# Patient Record
Sex: MALE | Race: WHITE | NOT HISPANIC OR LATINO | Employment: FULL TIME | URBAN - METROPOLITAN AREA
[De-identification: names, ages, dates, MRNs, and addresses within clinical notes are randomized per-mention and may not be internally consistent; named-entity substitution may affect disease eponyms.]

---

## 2017-01-17 ENCOUNTER — TELEPHONE (OUTPATIENT)
Dept: ORTHOPEDICS | Facility: CLINIC | Age: 62
End: 2017-01-17

## 2017-01-17 NOTE — TELEPHONE ENCOUNTER
----- Message from Rima Hardy sent at 1/17/2017  9:07 AM CST -----  Contact: self @ home   Pt need x-rays scheduled for his back, also requesting pain management for the pain.

## 2017-01-17 NOTE — TELEPHONE ENCOUNTER
Spoke with pt.  States he had a fall in December.  Pt still has lower back pain.  Pt denies any right hip pain.  Scheduled pt to see Alisa Correia PA-C

## 2017-01-18 ENCOUNTER — TELEPHONE (OUTPATIENT)
Dept: ORTHOPEDICS | Facility: CLINIC | Age: 62
End: 2017-01-18

## 2017-01-18 DIAGNOSIS — M54.50 LUMBAR SPINE PAIN: Primary | ICD-10-CM

## 2017-01-20 ENCOUNTER — TELEPHONE (OUTPATIENT)
Dept: ORTHOPEDICS | Facility: CLINIC | Age: 62
End: 2017-01-20

## 2017-01-20 ENCOUNTER — HOSPITAL ENCOUNTER (OUTPATIENT)
Dept: RADIOLOGY | Facility: HOSPITAL | Age: 62
Discharge: HOME | End: 2017-01-20
Attending: ORTHOPAEDIC SURGERY
Payer: COMMERCIAL

## 2017-01-20 ENCOUNTER — OFFICE VISIT (OUTPATIENT)
Dept: ORTHOPEDICS | Facility: CLINIC | Age: 62
End: 2017-01-20
Payer: COMMERCIAL

## 2017-01-20 VITALS
BODY MASS INDEX: 22.34 KG/M2 | WEIGHT: 174.06 LBS | SYSTOLIC BLOOD PRESSURE: 119 MMHG | DIASTOLIC BLOOD PRESSURE: 75 MMHG | HEART RATE: 97 BPM | HEIGHT: 74 IN

## 2017-01-20 DIAGNOSIS — M54.50 LUMBAR SPINE PAIN: ICD-10-CM

## 2017-01-20 DIAGNOSIS — M54.2 NECK PAIN: ICD-10-CM

## 2017-01-20 DIAGNOSIS — S12.9XXA: ICD-10-CM

## 2017-01-20 DIAGNOSIS — S12.9XXA: Primary | ICD-10-CM

## 2017-01-20 PROCEDURE — 3078F DIAST BP <80 MM HG: CPT | Mod: S$GLB,,, | Performed by: PHYSICIAN ASSISTANT

## 2017-01-20 PROCEDURE — 99214 OFFICE O/P EST MOD 30 MIN: CPT | Mod: S$GLB,,, | Performed by: PHYSICIAN ASSISTANT

## 2017-01-20 PROCEDURE — 1159F MED LIST DOCD IN RCRD: CPT | Mod: S$GLB,,, | Performed by: PHYSICIAN ASSISTANT

## 2017-01-20 PROCEDURE — 99999 PR PBB SHADOW E&M-EST. PATIENT-LVL III: CPT | Mod: PBBFAC,,, | Performed by: PHYSICIAN ASSISTANT

## 2017-01-20 PROCEDURE — 72100 X-RAY EXAM L-S SPINE 2/3 VWS: CPT | Mod: 26,,, | Performed by: RADIOLOGY

## 2017-01-20 PROCEDURE — 72050 X-RAY EXAM NECK SPINE 4/5VWS: CPT | Mod: 26,,, | Performed by: RADIOLOGY

## 2017-01-20 PROCEDURE — 72120 X-RAY BEND ONLY L-S SPINE: CPT | Mod: 26,,, | Performed by: RADIOLOGY

## 2017-01-20 PROCEDURE — 3074F SYST BP LT 130 MM HG: CPT | Mod: S$GLB,,, | Performed by: PHYSICIAN ASSISTANT

## 2017-01-20 PROCEDURE — 72050 X-RAY EXAM NECK SPINE 4/5VWS: CPT | Mod: TC

## 2017-01-20 PROCEDURE — 72120 X-RAY BEND ONLY L-S SPINE: CPT | Mod: TC

## 2017-01-20 RX ORDER — TRAMADOL HYDROCHLORIDE 50 MG/1
50-100 TABLET ORAL
Qty: 90 TABLET | Refills: 0 | Status: SHIPPED | OUTPATIENT
Start: 2017-01-20 | End: 2017-01-23

## 2017-01-20 NOTE — PROGRESS NOTES
DATE: 1/20/2017  PATIENT: Rah Puente    Supervising Physician: Franklyn Ng M.D.    CHIEF COMPLAINT: neck pain    HISTORY:  Rah Puente is a 61 y.o. male here for initial evaluation of neck pain (Neck - 3, Arm - 0). The pain has been present for about 6 weeks.  He fell at his house in December and landed on his coffee table.  Since then he has had neck pain.  He has a history of chronic back pain as well.  The patient describes the pain as aching. The pain is worse with extension and improved by nothing in particular. There is no associated numbness and tingling. He says he has been going to PT and one time while the therapist was doing a manipulation he felt numbness and tingling in his right hand but hasn't felt any since.  There is no subjective weakness.  He says he feels like he has progressive kyphosis of his neck.  Prior treatments have included ibuprofen and hydrocodone and physical therapy, but no ESIs or surgery.     The patient denies myelopathic symptoms such as handwriting changes or difficulty with buttons/coins/keys. Denies perineal paresthesias, bowel/bladder dysfunction.    PAST MEDICAL/SURGICAL HISTORY:  Past Medical History   Diagnosis Date    Alcohol abuse     BPH (benign prostatic hyperplasia)     Diabetes mellitus     Hypertension     Hyponatremia      Past Surgical History   Procedure Laterality Date    Hernia repair         Medications:  Current Outpatient Prescriptions on File Prior to Visit   Medication Sig Dispense Refill    gabapentin (NEURONTIN) 300 MG capsule Take 300 mg by mouth 2 (two) times daily.      metformin (GLUCOPHAGE) 500 MG tablet Take 500 mg by mouth 2 (two) times daily with meals.      metoprolol succinate (TOPROL-XL) 200 MG 24 hr tablet Take 200 mg by mouth 2 (two) times daily.      quinapril (ACCUPRIL) 20 MG tablet Take 20 mg by mouth every evening.      tamsulosin (FLOMAX) 0.4 mg Cp24 Take 0.4 mg by mouth once daily.       "[DISCONTINUED] hydrocodone-acetaminophen 5-325mg (NORCO) 5-325 mg per tablet Take 1 tablet by mouth every 6 (six) hours as needed for Pain. 60 tablet 0     No current facility-administered medications on file prior to visit.        Social History:   Social History     Social History    Marital status: Single     Spouse name: N/A    Number of children: N/A    Years of education: N/A     Occupational History    Not on file.     Social History Main Topics    Smoking status: Former Smoker     Packs/day: 1.00     Types: Cigarettes     Quit date: 12/7/2014    Smokeless tobacco: Not on file    Alcohol use 2.4 oz/week     4 Shots of liquor per week      Comment: vodka-nightly    Drug use: No    Sexual activity: Not on file     Other Topics Concern    Not on file     Social History Narrative       REVIEW OF SYSTEMS:  Constitution: Negative. Negative for chills, fever and night sweats.   Cardiovascular: Negative for chest pain and syncope.   Respiratory: Negative for cough and shortness of breath.   Gastrointestinal: See HPI. Negative for nausea/vomiting. Negative for abdominal pain.  Genitourinary: See HPI. Negative for discoloration or dysuria.  Skin: Negative for dry skin, itching and rash.   Hematologic/Lymphatic: Negative for bleeding problem. Does not bruise/bleed easily.   Musculoskeletal: Negative for falls and muscle weakness.   Neurological: See HPI. No seizures.   Endocrine: Negative for polydipsia, polyphagia and polyuria.   Allergic/Immunologic: Negative for hives and persistent infections.  Psychiatric/Behavioral: Negative for depression and insomnia.         EXAM:  Visit Vitals    /75    Pulse 97    Ht 6' 2" (1.88 m)    Wt 78.9 kg (174 lb 0.9 oz)    BMI 22.35 kg/m2       General: The patient is a very pleasant 61 y.o. male in no apparent distress, the patient is oriented to person, place and time.  Psych: Normal mood and affect  HEENT: Vision grossly intact, hearing intact to the spoken " word.  Lungs: Respirations unlabored.  Gait: Normal station and gait, no difficulty with toe or heel walk.   Skin: Cervical skin negative for rashes, lesions, hairy patches and surgical scars.  Range of motion: Cervical range of motion is acceptable. There is mild trapezial tenderness to palpation.  Spinal Balance: Global saggital and coronal spinal balance acceptable, no significant for scoliosis and kyphosis.  Musculoskeletal: No pain with the range of motion of the bilateral shoulders and elbows. Normal bulk and contour of the bilateral hands.  Vascular: Bilateral hands warm and well perfused, radial pulses 2+ bilaterally.  Neurological: Normal strength and tone in all major motor groups in the bilateral upper and lower extremities. Normal sensation to light touch in the C5-T1 and L2-S1 dermatomes bilaterally.  Deep tendon reflexes symmetric 2+ in the bilateral upper and lower extremities.  Mildly positive Inverted Radial Reflex on the right, negative on the left and negative Barbour's bilaterally. Negative Babinski bilaterally.     IMAGING:   Today I personally reviewed AP, Lat and Flex/Ex  upright C-spine films that demonstrate compression of C5.  There is a spinous process fracture of C6.  There is no instability on flexion and extension.     ASSESSMENT/PLAN:    Rah was seen today for low-back pain and neck pain.    Diagnoses and all orders for this visit:    Fracture of spinous process of cervical vertebra, initial encounter  -     X-Ray Cervical Spine AP Lat with Flex Ex; Future  -     CT Cervical Spine Without Contrast; Future    Compression fracture of cervical vertebra, initial encounter    Other orders  -     tramadol (ULTRAM) 50 mg tablet; Take 1-2 tablets ( mg total) by mouth every 4 to 6 hours as needed.    There is a compression fracture fracture of C5 and spinous process fracture of C6.  There is no instability on flexion and extension.  I have discussed with the patient my recommendation to  wear a cervical collar.  He does not want to wear a collar.  CT cervical spine for further evaluation.  He will follow up with me after the CT to discuss results and further treatment.      Return if symptoms worsen or fail to improve.

## 2017-01-20 NOTE — TELEPHONE ENCOUNTER
----- Message from Anna Mckeon MA sent at 1/19/2017 11:54 AM CST -----  Contact: Shantell Crawford [phone]      ----- Message -----     From: Rima Hardy     Sent: 1/19/2017  11:34 AM       To: [facility] Cuellar Staff    Looking for x-rays to rule out fx in cervical spine from recent fall, 4 wks ago due to continue pain.

## 2017-01-21 ENCOUNTER — HOSPITAL ENCOUNTER (OUTPATIENT)
Dept: RADIOLOGY | Facility: HOSPITAL | Age: 62
Discharge: HOME | End: 2017-01-21
Attending: ORTHOPAEDIC SURGERY
Payer: COMMERCIAL

## 2017-01-21 DIAGNOSIS — S12.9XXA: ICD-10-CM

## 2017-01-21 PROCEDURE — 72125 CT NECK SPINE W/O DYE: CPT | Mod: 26,,, | Performed by: RADIOLOGY

## 2017-01-21 PROCEDURE — 72125 CT NECK SPINE W/O DYE: CPT | Mod: TC

## 2017-01-23 ENCOUNTER — OFFICE VISIT (OUTPATIENT)
Dept: ORTHOPEDICS | Facility: CLINIC | Age: 62
End: 2017-01-23
Payer: COMMERCIAL

## 2017-01-23 VITALS
DIASTOLIC BLOOD PRESSURE: 102 MMHG | SYSTOLIC BLOOD PRESSURE: 176 MMHG | BODY MASS INDEX: 22.07 KG/M2 | HEIGHT: 74 IN | HEART RATE: 92 BPM | WEIGHT: 172 LBS

## 2017-01-23 DIAGNOSIS — S12.9XXA: Primary | ICD-10-CM

## 2017-01-23 DIAGNOSIS — M48.52XA: ICD-10-CM

## 2017-01-23 PROCEDURE — 1159F MED LIST DOCD IN RCRD: CPT | Mod: S$GLB,,, | Performed by: PHYSICIAN ASSISTANT

## 2017-01-23 PROCEDURE — 3077F SYST BP >= 140 MM HG: CPT | Mod: S$GLB,,, | Performed by: PHYSICIAN ASSISTANT

## 2017-01-23 PROCEDURE — 99999 PR PBB SHADOW E&M-EST. PATIENT-LVL III: CPT | Mod: PBBFAC,,, | Performed by: PHYSICIAN ASSISTANT

## 2017-01-23 PROCEDURE — 99213 OFFICE O/P EST LOW 20 MIN: CPT | Mod: S$GLB,,, | Performed by: PHYSICIAN ASSISTANT

## 2017-01-23 PROCEDURE — 3080F DIAST BP >= 90 MM HG: CPT | Mod: S$GLB,,, | Performed by: PHYSICIAN ASSISTANT

## 2017-01-23 RX ORDER — HYDROCODONE BITARTRATE AND ACETAMINOPHEN 10; 325 MG/1; MG/1
1 TABLET ORAL EVERY 4 HOURS PRN
Qty: 90 TABLET | Refills: 0 | Status: SHIPPED | OUTPATIENT
Start: 2017-01-23 | End: 2017-02-07 | Stop reason: SDUPTHER

## 2017-01-23 NOTE — PROGRESS NOTES
"DATE: 1/23/2017  PATIENT: Rah Puente    Attending Physician: Franklyn Ng M.D.    HISTORY:  Rah Puente is a 61 y.o. male who returns to me today for CT results.  He was last seen by me 1/20/2017.  Today he is doing well but notes the tramadol does not help with his pain.    The Patient denies myelopathic symptoms such as handwriting changes or difficulty with buttons/coins/keys. Denies perineal paresthesias, bowel/bladder dysfunction.    PMH/PSH/FamHx/SocHx:  Unchanged from prior visit    ROS:  REVIEW OF SYSTEMS:  Constitution: Negative. Negative for chills, fever and night sweats.   HENT: Negative for congestion and headaches.    Eyes: Negative for blurred vision, left vision loss and right vision loss.   Cardiovascular: Negative for chest pain and syncope.   Respiratory: Negative for cough and shortness of breath.    Endocrine: Negative for polydipsia, polyphagia and polyuria.   Hematologic/Lymphatic: Negative for bleeding problem. Does not bruise/bleed easily.   Skin: Negative for dry skin, itching and rash.   Musculoskeletal: Negative for falls and muscle weakness.   Gastrointestinal: Negative for abdominal pain and bowel incontinence.   Allergic/Immunologic: Negative for hives and persistent infections.  Genitourinary: Negative for urinary retention/incontinence and nocturia.   Neurological: Negative for disturbances in coordination, no myelopathic symptoms such as handwriting changes or difficulty with buttons, coins, keys or small objects. No loss of balance and seizures.   Psychiatric/Behavioral: Negative for depression. The patient does not have insomnia.   Denies myelopathic symptoms, perineal paresthesias, bowel or bladder incontinence    EXAM:  Visit Vitals    BP (!) 176/102 (BP Location: Right arm, Patient Position: Sitting, BP Method: Automatic)    Pulse 92    Ht 6' 2" (1.88 m)    Wt 78 kg (172 lb)    BMI 22.08 kg/m2       General: The patient is a very pleasant 61 y.o. " male in no apparent distress, the patient is oriented to person, place and time.  Psych: Normal mood and affect  HEENT: Vision grossly intact, hearing intact to the spoken word.  Lungs: Respirations unlabored.  Gait: Normal station and gait, no difficulty with toe or heel walk.   Skin: Cervical skin negative for rashes, lesions, hairy patches and surgical scars.  Range of motion: Cervical range of motion is acceptable. There is mild trapezial tenderness to palpation.  Spinal Balance: Global saggital and coronal spinal balance acceptable, no significant for scoliosis and kyphosis.  Musculoskeletal: No pain with the range of motion of the bilateral shoulders and elbows. Normal bulk and contour of the bilateral hands.  Vascular: Bilateral hands warm and well perfused, radial pulses 2+ bilaterally.  Neurological: Normal strength and tone in all major motor groups in the bilateral upper and lower extremities. Normal sensation to light touch in the C5-T1 and L2-S1 dermatomes bilaterally.  Deep tendon reflexes symmetric 2+ in the bilateral upper and lower extremities.  Mildly positive Inverted Radial Reflex on the right, negative on the left and negative Barbour's bilaterally. Negative Babinski bilaterally.     IMAGING:    Today I personally re-reviewed AP, Lat and Flex/Ex  upright C-spine films that demonstrate compression of C5. There is a spinous process fracture of C6. There is no instability on flexion and extension.     CT cervical spine demonstrates multiple lytic lesions in the lower cervical/upper thoracic spine with multiple lung nodules.  There is fracture of C6 spinous process.  Compression of C5 vertebral body.      ASSESSMENT/PLAN:    Diagnoses and all orders for this visit:    Fracture of spinous process of cervical vertebra, initial encounter    Pathologic compression fracture of cervical vertebra, initial encounter    Other orders  -     hydrocodone-acetaminophen 10-325mg (NORCO)  mg Tab; Take 1  tablet by mouth every 4 (four) hours as needed for Pain.      Discussed with Dr. Ng.  The patient has a history of colon resection in March 2016.  No history of primary cancer.  He was fitted with a C-collar today.  Will refer to Dr. Goetz for further evaluation.     No Follow-up on file.

## 2017-01-26 ENCOUNTER — TELEPHONE (OUTPATIENT)
Dept: HEMATOLOGY/ONCOLOGY | Facility: CLINIC | Age: 62
End: 2017-01-26

## 2017-01-27 ENCOUNTER — TELEPHONE (OUTPATIENT)
Dept: ORTHOPEDICS | Facility: CLINIC | Age: 62
End: 2017-01-27

## 2017-01-27 ENCOUNTER — TELEPHONE (OUTPATIENT)
Dept: HEMATOLOGY/ONCOLOGY | Facility: CLINIC | Age: 62
End: 2017-01-27

## 2017-01-30 ENCOUNTER — INITIAL CONSULT (OUTPATIENT)
Dept: HEMATOLOGY/ONCOLOGY | Facility: CLINIC | Age: 62
End: 2017-01-30
Payer: COMMERCIAL

## 2017-01-30 VITALS
BODY MASS INDEX: 21.94 KG/M2 | SYSTOLIC BLOOD PRESSURE: 160 MMHG | WEIGHT: 170.88 LBS | DIASTOLIC BLOOD PRESSURE: 72 MMHG | RESPIRATION RATE: 18 BRPM | TEMPERATURE: 98 F | HEART RATE: 97 BPM

## 2017-01-30 DIAGNOSIS — R91.8 PULMONARY NODULES/LESIONS, MULTIPLE: ICD-10-CM

## 2017-01-30 DIAGNOSIS — G89.3 NEOPLASM RELATED PAIN (ACUTE) (CHRONIC): ICD-10-CM

## 2017-01-30 DIAGNOSIS — Z85.038 HISTORY OF COLON CANCER, STAGE I: Primary | ICD-10-CM

## 2017-01-30 DIAGNOSIS — M89.9 LYTIC LESION OF BONE ON X-RAY: ICD-10-CM

## 2017-01-30 PROCEDURE — 99999 PR PBB SHADOW E&M-EST. PATIENT-LVL IV: CPT | Mod: PBBFAC,,, | Performed by: INTERNAL MEDICINE

## 2017-01-30 PROCEDURE — 1159F MED LIST DOCD IN RCRD: CPT | Mod: S$GLB,,, | Performed by: INTERNAL MEDICINE

## 2017-01-30 PROCEDURE — 3078F DIAST BP <80 MM HG: CPT | Mod: S$GLB,,, | Performed by: INTERNAL MEDICINE

## 2017-01-30 PROCEDURE — 3077F SYST BP >= 140 MM HG: CPT | Mod: S$GLB,,, | Performed by: INTERNAL MEDICINE

## 2017-01-30 PROCEDURE — 99205 OFFICE O/P NEW HI 60 MIN: CPT | Mod: S$GLB,,, | Performed by: INTERNAL MEDICINE

## 2017-01-30 RX ORDER — FENTANYL 50 UG/1
1 PATCH TRANSDERMAL
Qty: 5 PATCH | Refills: 0 | Status: ON HOLD | OUTPATIENT
Start: 2017-01-30 | End: 2017-03-04 | Stop reason: HOSPADM

## 2017-01-30 NOTE — LETTER
January 31, 2017      Alisa Correia PA-C  [address] [facility] [address]           [facility]  [address]  [facility] [address]  Phone: [phone]          Patient: Rah Puente   MR Number: [id number]   YOB: 1955   Date of Visit: 1/30/2017       Dear Alisa Correia:    Thank you for referring Rah Puente to me for evaluation. Attached you will find relevant portions of my assessment and plan of care.    If you have questions, please do not hesitate to call me. I look forward to following Rah Puente along with you.    Sincerely,    Bill Goetz MD    Enclosure  CC:  No Recipients    If you would like to receive this communication electronically, please contact [email or web address] or [phone] to request more information on [email or web address] Link access.    For providers and/or their staff who would like to refer a patient to [facility], please contact us through our one-stop-shop provider referral line, [facility], at [phone].    If you feel you have received this communication in error or would no longer like to receive these types of communications, please e-mail [email or web address]

## 2017-01-30 NOTE — Clinical Note
Schedule labs (CBC,CMP), CT chest/abdomen/pelvis and bone scan ASAP.  Schedule IR guided lung biopsy. Have pt RTC 1.5-2 weeks after biopsy to review results with Dr. Goetz.

## 2017-01-30 NOTE — MR AVS SNAPSHOT
[facility] - [facility]ematology Oncology  [address]  [facility] [address]  Phone: [phone]                  Rah Puente   2017 3:30 PM   Initial consult    Description:  Male : 1955   Provider:  Bill Goetz MD   Department:  [facility] - Hematology Oncology           Reason for Visit     bone mets           Diagnoses this Visit        Comments    History of colon cancer, stage I    -  Primary     Lytic lesion of bone on x-ray         Pulmonary nodules/lesions, multiple                To Do List           Goals (5 Years of Data)     None      [facility]     [facility] Nurse Care Line -  Assistance  Registered nurses in the [facility] Center provide clinical advisement, health education, appointment booking, and other advisory services.  Call for this free service at [phone].             Medications           Message regarding Medications     Verify the changes and/or additions to your medication regime listed below are the same as discussed with your clinician today.  If any of these changes or additions are incorrect, please notify your healthcare provider.             Verify that the below list of medications is an accurate representation of the medications you are currently taking.  If none reported, the list may be blank. If incorrect, please contact your healthcare provider. Carry this list with you in case of emergency.           Current Medications     gabapentin (NEURONTIN) 300 MG capsule Take 300 mg by mouth 2 (two) times daily.    hydrocodone-acetaminophen 10-325mg (NORCO)  mg Tab Take 1 tablet by mouth every 4 (four) hours as needed for Pain.    metformin (GLUCOPHAGE) 500 MG tablet Take 500 mg by mouth 2 (two) times daily with meals.    metoprolol succinate (TOPROL-XL) 200 MG 24 hr tablet Take 200 mg by mouth 2 (two) times daily.    quinapril (ACCUPRIL) 20 MG tablet Take 20 mg by mouth every evening.    tamsulosin (FLOMAX) 0.4 mg Cp24 Take 0.4  mg by mouth once daily.           Clinical Reference Information           Vital Signs - Last Recorded  Most recent update: 1/30/2017  3:53 PM by Melissa Avina RN    BP Pulse Temp Resp Wt BMI    (!) 160/72 (BP Location: Right arm, Patient Position: Sitting, BP Method: Automatic) 97 98.2 °F (36.8 °C) (Oral) 18 77.5 kg (170 lb 13.7 oz) 21.94 kg/m2      Blood Pressure          Most Recent Value    BP  (!)  160/72      Allergies as of 1/30/2017     No Known Allergies      Immunizations Administered on Date of Encounter - 1/30/2017     None      [facility] Sign-Up     Activating your [facility] account is as easy as 1-2-3!     1) Visit [email or web address], select Sign Up Now, enter this activation code and your date of birth, then select Next.  [email or web address]  Expires: 3/16/2017  4:07 PM      2) Create a username and password to use when you visit [facility] in the future and select a security question in case you lose your password and select Next.    3) Enter your e-mail address and click Sign Up!    Additional Information  If you have questions, please e-mail [email or web address] or call [phone] to talk to our [facility] staff. Remember, [facility] is NOT to be used for urgent needs. For medical emergencies, dial 911.

## 2017-01-30 NOTE — PROGRESS NOTES
Subjective:       Patient ID: Rah Puente is a 61 y.o. male.    Chief Complaint: bone mets    HPI   61 year old male, referred by Dr. Paula, for consultation after recent CT imaging showed several lytic lesions in the spine and pulmonary nodules that were concerning for metastatic disease. Pt was having CT scans performed due to worsening neck/back pain after falling in his house in December and landing on his coffee table. Pt has a history of Stage I colon cancer and underwent colectomy in March 2016 for chronic intussusception. It appears he did not have any oncologic follow-up afterwards.     Pt is currently taking 2 pills of Norco 10-325mg every 4-6 hours and gabapentin 300md bid with little relief. Pt is using neck brace as instructed.    He denies any mouth sores, nausea, vomiting, diarrhea, constipation, abdominal pain, chest pain, shortness of breath, leg swelling, headache, dizziness, or mood changes.    His ECOG PS is 1 and he is accompanied alone to clinic. He is originally from [place] and moved to [place] 10 years ago. He is the  of [facility]. Previous smoker, quit 4 years ago.\    Oncologic History (From Chart/Patient):  In March of 2016, patient underwent colectomy with Dr. Montague after CT scan of abdomen by his PCP showed intussusception of proximal and transverse colon. Pathology revealed villous adenoma with malignant invasion, Stage 1(pT2pN0), low grade.   CT from 1/21/17 revealed Multiple lytic lesions in the lower cervical/upper thoracic spine, and multiple lung nodules, as described above.  These findings are concerning for metastatic disease.Fracture of the distal portion of the C6 spinous process, commonly known as Reed shovelers fracture.Compression fracture of the C5 vertebral body with moderate height loss.Mild spinal canal stenosis and moderate bilateral neuroforaminal stenosis at C4-C5.    Review of Systems   Constitutional: Positive for appetite change.  Negative for activity change, fatigue, fever and unexpected weight change.   HENT: Negative for mouth sores, nosebleeds and trouble swallowing.    Eyes: Negative for discharge and visual disturbance.   Respiratory: Negative for cough, shortness of breath and wheezing.    Cardiovascular: Negative for chest pain and leg swelling.   Gastrointestinal: Negative for abdominal distention, abdominal pain, blood in stool, constipation, diarrhea, nausea and vomiting.   Genitourinary: Negative for decreased urine volume, difficulty urinating, frequency and hematuria.   Musculoskeletal: Positive for back pain and neck pain.   Skin: Negative for color change and rash.   Neurological: Negative for weakness and headaches.   Hematological: Negative for adenopathy.   Psychiatric/Behavioral: Negative for sleep disturbance. The patient is not nervous/anxious.    All other systems reviewed and are negative.      Allergies:  Review of patient's allergies indicates:  No Known Allergies    Medications:  Current Outpatient Prescriptions   Medication Sig Dispense Refill    gabapentin (NEURONTIN) 300 MG capsule Take 300 mg by mouth 2 (two) times daily.      hydrocodone-acetaminophen 10-325mg (NORCO)  mg Tab Take 1 tablet by mouth every 4 (four) hours as needed for Pain. 90 tablet 0    metformin (GLUCOPHAGE) 500 MG tablet Take 500 mg by mouth 2 (two) times daily with meals.      metoprolol succinate (TOPROL-XL) 200 MG 24 hr tablet Take 200 mg by mouth 2 (two) times daily.      quinapril (ACCUPRIL) 20 MG tablet Take 20 mg by mouth every evening.      tamsulosin (FLOMAX) 0.4 mg Cp24 Take 0.4 mg by mouth once daily.      fentaNYL (DURAGESIC) 50 mcg/hr Place 1 patch onto the skin every 72 hours. 5 patch 0     No current facility-administered medications for this visit.        PMH:  Past Medical History   Diagnosis Date    Alcohol abuse     BPH (benign prostatic hyperplasia)     Diabetes mellitus     Hypertension     Hyponatremia         PSH:  Past Surgical History   Procedure Laterality Date    Hernia repair         FamHx:  History reviewed. No pertinent family history.    SocHx:  Social History     Social History    Marital status: Single     Spouse name: N/A    Number of children: N/A    Years of education: N/A     Occupational History    Not on file.     Social History Main Topics    Smoking status: Former Smoker     Packs/day: 1.00     Types: Cigarettes     Quit date: 12/7/2014    Smokeless tobacco: Not on file    Alcohol use 2.4 oz/week     4 Shots of liquor per week      Comment: vodka-nightly    Drug use: No    Sexual activity: Not on file     Other Topics Concern    Not on file     Social History Narrative       Distress Score    Distress Score: 5        Practical Problems Physical Problems   : No Appearance: No   Housing: No Bathing / Dressing: No   Insurance / Financial: No Breathing: No    Transportation: No  Changes in Urination: No    Work / School: No  Constipation: No   Treatment Decisions: Yes  Diarrhea: No     Eating: No    Family Problems Fatigue: No    Dealing with Children: No Feeling Swollen: No    Dealing with Partner: No Fevers: No    Ability to Have Children: No  Getting Around: No    Family Health Issues: No  Indigestion: No     Memory / Concentration: No   Emotional Problems Mouth Sores: No    Depression: No  Nausea: No    Fears: No  Nose Dry / Congested: No    Nervousness: No  Pain: Yes    Sadness: No Sexual: No    Worry: Yes Skin Dry / Itchy: No    Lost of Interest in Usual Activities: No Sleep: No          Spiritual/Religions Concerns Tingling in Hands / Feet: No   Spritual / Anglican Concerns: No         Other Problems                Objective:      Physical Exam   Constitutional: He is oriented to person, place, and time. He appears well-developed and well-nourished.   HENT:   Head: Normocephalic and atraumatic.   Mouth/Throat: Uvula is midline and oropharynx is clear and moist. No  oropharyngeal exudate.   Eyes: Conjunctivae and EOM are normal. Pupils are equal, round, and reactive to light.   Neck: Trachea normal.   Cardiovascular: Normal rate, regular rhythm, normal heart sounds and normal pulses.    No swelling noted to bilateral lower extremities.   Pulmonary/Chest: Effort normal and breath sounds normal. No respiratory distress. He has no wheezes. He has no rales.   Abdominal: Soft. Normal appearance and bowel sounds are normal. He exhibits no distension. There is no tenderness. There is no rebound and no guarding.   Musculoskeletal:   Neck brace in place   Neurological: He is alert and oriented to person, place, and time.   Skin: Skin is warm, dry and intact. No rash noted. He is not diaphoretic.   Psychiatric: He has a normal mood and affect. His speech is normal and behavior is normal.   Nursing note and vitals reviewed.      Assessment:       1. History of colon cancer, stage I    2. Lytic lesion of bone on x-ray    3. Pulmonary nodules/lesions, multiple    4. Neoplasm related pain (acute) (chronic)        Plan:       1,2,3-   61 year old male, referred by Dr. Paula, for consultation after recent CT imaging showed several lytic lesions in the spine and pulmonary nodules that are concerning for metastatic disease. Pt was having CT scans performed due to worsening neck/back pain after falling in his house in December. Pt has a history of Stage I colon cancer and underwent colectomy in March 2016 for chronic intussusception. It appears he did not have any oncologic follow-up afterwards. Recent CT scans personally reviewed by myself and Dr. Goetz. Discussed with the patient that it appears that he has widely metastatic cancer of unknown origin. Will plan on getting baseline labs, CT CAP, bone scan, and try to get biopsy of lung nodules. Will plan to have patient RTC to discuss biopsy pathology with Dr. Goetz approximately 2 weeks after biopsy.     4- Increase gabapentin to tid.  Start fentanyl 50mcg. Continue to use Norco PRN.      Patient was also seen and examined by Dr. Goetz. Patient is in agreement with the proposed treatment plan. All questions were answered to the patient's satisfaction. Pt knows to call clinic if anything is needed before the next clinic visit.    ANI Thompson  Hematology and Medical Oncology        Distress Screening Results: Psychosocial Distress screening score of Distress Score: 5 noted and reviewed. No intervention indicated.           I have reviewed the notes, assessments, and/or procedures performed by the housestaff, as above.  I have personally interviewed and examined the patient at the beside, and rounded with the housestaff. I concur with her/his assessment and plan and the documentation of Rah Puente.  More than 60 mins were spent during this encounter, greater than 50% was spent in direct counseling and/or coordination of care.     Bill Goetz M.D., M.S.  Hematology/Oncology Attending  [facility]

## 2017-02-01 ENCOUNTER — TELEPHONE (OUTPATIENT)
Dept: HEMATOLOGY/ONCOLOGY | Facility: CLINIC | Age: 62
End: 2017-02-01

## 2017-02-01 NOTE — TELEPHONE ENCOUNTER
----- Message from Ailyn Sanon sent at 2/1/2017 10:29 AM CST -----  Contact: self  Pt states is calling to check the status of a biopsy being scheduled, pt explained he's not able to get on [facility] to see appointments.  Contact number [phone]

## 2017-02-01 NOTE — TELEPHONE ENCOUNTER
Spoke with patient he states since last Friday he cannot lift his arm above his shoulder/ no pain but does have numbness and tingling at times and has full use of his hand.    Informed him we were working on the bx appt and would call him with appt once the procedure has been approved and scheduled.

## 2017-02-03 ENCOUNTER — HOSPITAL ENCOUNTER (OUTPATIENT)
Dept: RADIOLOGY | Facility: HOSPITAL | Age: 62
Discharge: HOME | End: 2017-02-03
Attending: NURSE PRACTITIONER
Payer: COMMERCIAL

## 2017-02-03 DIAGNOSIS — M89.9 LYTIC LESION OF BONE ON X-RAY: ICD-10-CM

## 2017-02-03 DIAGNOSIS — Z85.038 HISTORY OF COLON CANCER, STAGE I: ICD-10-CM

## 2017-02-03 DIAGNOSIS — R91.8 PULMONARY NODULES/LESIONS, MULTIPLE: ICD-10-CM

## 2017-02-03 PROCEDURE — 74177 CT ABD & PELVIS W/CONTRAST: CPT | Mod: TC

## 2017-02-03 PROCEDURE — 74177 CT ABD & PELVIS W/CONTRAST: CPT | Mod: 26,,, | Performed by: RADIOLOGY

## 2017-02-03 PROCEDURE — 25500020 PHARM REV CODE 255: Performed by: NURSE PRACTITIONER

## 2017-02-03 PROCEDURE — 71260 CT THORAX DX C+: CPT | Mod: 26,,, | Performed by: RADIOLOGY

## 2017-02-03 PROCEDURE — 71260 CT THORAX DX C+: CPT | Mod: TC

## 2017-02-03 RX ADMIN — IOHEXOL 75 ML: 350 INJECTION, SOLUTION INTRAVENOUS at 02:02

## 2017-02-03 RX ADMIN — IOHEXOL 15 ML: 350 INJECTION, SOLUTION INTRAVENOUS at 12:02

## 2017-02-03 RX ADMIN — IOHEXOL 15 ML: 350 INJECTION, SOLUTION INTRAVENOUS at 11:02

## 2017-02-06 ENCOUNTER — TELEPHONE (OUTPATIENT)
Dept: HEMATOLOGY/ONCOLOGY | Facility: CLINIC | Age: 62
End: 2017-02-06

## 2017-02-06 DIAGNOSIS — M89.9 LYTIC LESION OF BONE ON X-RAY: ICD-10-CM

## 2017-02-06 DIAGNOSIS — R91.8 PULMONARY NODULES/LESIONS, MULTIPLE: ICD-10-CM

## 2017-02-06 DIAGNOSIS — Z85.038 HISTORY OF COLON CANCER, STAGE I: Primary | ICD-10-CM

## 2017-02-06 NOTE — TELEPHONE ENCOUNTER
----- Message from Alexey Lennon Jr., MD sent at 2/6/2017 12:52 PM CST -----  Bill, one of the bone lesions will probably be the safest. Either left c6 transverse process, right L2, or right ileum. Please place order for IR bone biopsy.    Vidya, please book a day dr vivar is in IR. We all would probably biopsy c6. Cori daniel      ----- Message -----     From: Bill Goetz MD     Sent: 2/1/2017   8:21 AM       To: Alexey Lennon Jr., MD, MAURA Edgar,  This is a raúl who we are hoping to get a biopsy on next week if possible.  He's having CT CAP on Friday for you to review.  Looks like he has pretty extensive metastatic disease, but that's only based on limited imaging of the neck.  Please let me know what you think once you've had a chance to review Friday's scans.  Thank you!!  -Bill      ----- Message -----     From: Veronica Keith NP     Sent: 1/31/2017   2:19 PM       To: Bill Gomez MD    I tried calling him back but he did not answer. We usually get the CTs first and then the radiologist will look at it to determine if it is able to be biopsied. Is this correct Dr. Goetz?     ----- Message -----     From: Kelli Norris     Sent: 1/31/2017   1:59 PM       To: MAURA Edgar Dr. Devoe with IR phoned to speak with you about the patient's biopsy. Thinks the patient might need to have a CT C/A/P before biopsy. [place] at [phone].

## 2017-02-07 ENCOUNTER — HOSPITAL ENCOUNTER (OUTPATIENT)
Dept: RADIOLOGY | Facility: HOSPITAL | Age: 62
Discharge: HOME | DRG: 472 | End: 2017-02-07
Attending: NURSE PRACTITIONER
Payer: COMMERCIAL

## 2017-02-07 ENCOUNTER — TELEPHONE (OUTPATIENT)
Dept: HEMATOLOGY/ONCOLOGY | Facility: CLINIC | Age: 62
End: 2017-02-07

## 2017-02-07 DIAGNOSIS — M89.9 LYTIC LESION OF BONE ON X-RAY: ICD-10-CM

## 2017-02-07 DIAGNOSIS — Z85.038 HISTORY OF COLON CANCER, STAGE I: ICD-10-CM

## 2017-02-07 DIAGNOSIS — R91.8 PULMONARY NODULES/LESIONS, MULTIPLE: ICD-10-CM

## 2017-02-07 DIAGNOSIS — M89.9 LYTIC BONE LESIONS ON XRAY: Primary | ICD-10-CM

## 2017-02-07 DIAGNOSIS — G89.3 NEOPLASM RELATED PAIN (ACUTE) (CHRONIC): Primary | ICD-10-CM

## 2017-02-07 DIAGNOSIS — G89.3 NEOPLASM RELATED PAIN (ACUTE) (CHRONIC): ICD-10-CM

## 2017-02-07 PROCEDURE — 78306 BONE IMAGING WHOLE BODY: CPT | Mod: 26,,, | Performed by: RADIOLOGY

## 2017-02-07 RX ORDER — HYDROCODONE BITARTRATE AND ACETAMINOPHEN 10; 325 MG/1; MG/1
1 TABLET ORAL EVERY 4 HOURS PRN
Qty: 90 TABLET | Refills: 0 | Status: ON HOLD | OUTPATIENT
Start: 2017-02-07 | End: 2017-03-04 | Stop reason: HOSPADM

## 2017-02-07 RX ORDER — HYDROCODONE BITARTRATE AND ACETAMINOPHEN 10; 325 MG/1; MG/1
1 TABLET ORAL EVERY 4 HOURS PRN
Qty: 90 TABLET | Refills: 0 | Status: SHIPPED | OUTPATIENT
Start: 2017-02-07 | End: 2017-02-07 | Stop reason: SDUPTHER

## 2017-02-08 NOTE — TELEPHONE ENCOUNTER
spoke with patient  Patient has been scheduled on 2/9 with Veronica  Patient confirmed appointment.

## 2017-02-08 NOTE — TELEPHONE ENCOUNTER
----- Message from Greta Betts sent at 2/8/2017 11:10 AM CST -----  Contact: Patient  Patient called and said the pain in his neck is not getting better and he is wearing the collar. He said he has no appetite and is tired of being in pain.     He is asking to be seen today.     Please call him about this at [phone]

## 2017-02-09 ENCOUNTER — HOSPITAL ENCOUNTER (INPATIENT)
Facility: HOSPITAL | Age: 62
LOS: 8 days | Discharge: NURSING, REHAB OR LONG-TERM CARE | DRG: 472 | End: 2017-02-17
Attending: INTERNAL MEDICINE | Admitting: INTERNAL MEDICINE
Payer: COMMERCIAL

## 2017-02-09 ENCOUNTER — OFFICE VISIT (OUTPATIENT)
Dept: HEMATOLOGY/ONCOLOGY | Facility: CLINIC | Age: 62
DRG: 472 | End: 2017-02-09
Payer: COMMERCIAL

## 2017-02-09 VITALS
RESPIRATION RATE: 18 BRPM | DIASTOLIC BLOOD PRESSURE: 68 MMHG | HEART RATE: 73 BPM | SYSTOLIC BLOOD PRESSURE: 139 MMHG | TEMPERATURE: 98 F

## 2017-02-09 DIAGNOSIS — D37.4 VILLOUS ADENOMA OF RIGHT COLON: ICD-10-CM

## 2017-02-09 DIAGNOSIS — C79.51 BONE METASTASES: ICD-10-CM

## 2017-02-09 DIAGNOSIS — C78.00 MALIGNANT NEOPLASM METASTATIC TO LUNG, UNSPECIFIED LATERALITY: ICD-10-CM

## 2017-02-09 DIAGNOSIS — T14.8XXA HEMATOMA: ICD-10-CM

## 2017-02-09 DIAGNOSIS — N40.0 BENIGN NON-NODULAR PROSTATIC HYPERPLASIA WITHOUT LOWER URINARY TRACT SYMPTOMS: ICD-10-CM

## 2017-02-09 DIAGNOSIS — Z85.038 HISTORY OF COLON CANCER, STAGE I: ICD-10-CM

## 2017-02-09 DIAGNOSIS — F10.10 ALCOHOL ABUSE: ICD-10-CM

## 2017-02-09 DIAGNOSIS — C18.9 ADENOCARCINOMA OF COLON: ICD-10-CM

## 2017-02-09 DIAGNOSIS — C79.72 MALIGNANT NEOPLASM METASTATIC TO LEFT ADRENAL GLAND: ICD-10-CM

## 2017-02-09 DIAGNOSIS — C79.51 METASTATIC CANCER TO BONE: ICD-10-CM

## 2017-02-09 DIAGNOSIS — G95.9 CERVICAL MYELOPATHY: ICD-10-CM

## 2017-02-09 DIAGNOSIS — C79.9 CANCER, METASTATIC: Primary | ICD-10-CM

## 2017-02-09 DIAGNOSIS — M54.12 CERVICAL RADICULOPATHY: ICD-10-CM

## 2017-02-09 DIAGNOSIS — R29.898 UPPER EXTREMITY WEAKNESS: ICD-10-CM

## 2017-02-09 DIAGNOSIS — L02.419 CELLULITIS AND ABSCESS OF LEG: ICD-10-CM

## 2017-02-09 DIAGNOSIS — C79.51 MALIGNANT NEOPLASM METASTATIC TO BONE: ICD-10-CM

## 2017-02-09 DIAGNOSIS — L03.119 CELLULITIS AND ABSCESS OF LEG: ICD-10-CM

## 2017-02-09 DIAGNOSIS — E87.1 HYPONATREMIA: ICD-10-CM

## 2017-02-09 DIAGNOSIS — I10 ESSENTIAL HYPERTENSION: ICD-10-CM

## 2017-02-09 DIAGNOSIS — C79.72 METASTASIS TO LEFT ADRENAL GLAND OF UNKNOWN ORIGIN: ICD-10-CM

## 2017-02-09 DIAGNOSIS — K56.1 INTUSSUSCEPTION OF COLON: ICD-10-CM

## 2017-02-09 DIAGNOSIS — L03.116 CELLULITIS OF FOOT, LEFT: ICD-10-CM

## 2017-02-09 DIAGNOSIS — C77.2 METASTASIS TO RETROPERITONEAL LYMPH NODE: ICD-10-CM

## 2017-02-09 DIAGNOSIS — R29.898 UPPER EXTREMITY WEAKNESS: Primary | ICD-10-CM

## 2017-02-09 DIAGNOSIS — N17.9 AKI (ACUTE KIDNEY INJURY): ICD-10-CM

## 2017-02-09 DIAGNOSIS — C77.2 MALIGNANT NEOPLASM METASTATIC TO INTRA-ABDOMINAL LYMPH NODE: ICD-10-CM

## 2017-02-09 DIAGNOSIS — E87.1 CHRONIC HYPONATREMIA: ICD-10-CM

## 2017-02-09 DIAGNOSIS — E11.9 CONTROLLED TYPE 2 DIABETES MELLITUS WITHOUT COMPLICATION, WITHOUT LONG-TERM CURRENT USE OF INSULIN: ICD-10-CM

## 2017-02-09 DIAGNOSIS — E11.42 TYPE 2 DIABETES MELLITUS WITH DIABETIC POLYNEUROPATHY, WITHOUT LONG-TERM CURRENT USE OF INSULIN: ICD-10-CM

## 2017-02-09 DIAGNOSIS — T14.90XA TRAUMA: ICD-10-CM

## 2017-02-09 DIAGNOSIS — C80.1 METASTASIS TO LEFT ADRENAL GLAND OF UNKNOWN ORIGIN: ICD-10-CM

## 2017-02-09 LAB
ALBUMIN SERPL BCP-MCNC: 3.2 G/DL
ALP SERPL-CCNC: 336 U/L
ALT SERPL W/O P-5'-P-CCNC: 17 U/L
ANION GAP SERPL CALC-SCNC: 15 MMOL/L
APTT BLDCRRT: 28.8 SEC
AST SERPL-CCNC: 52 U/L
BASOPHILS # BLD AUTO: 0.01 K/UL
BASOPHILS NFR BLD: 0.1 %
BILIRUB SERPL-MCNC: 0.7 MG/DL
BUN SERPL-MCNC: 49 MG/DL
CALCIUM SERPL-MCNC: 9.3 MG/DL
CEA SERPL-MCNC: 415.5 NG/ML
CHLORIDE SERPL-SCNC: 79 MMOL/L
CO2 SERPL-SCNC: 28 MMOL/L
CREAT SERPL-MCNC: 3.4 MG/DL
DIFFERENTIAL METHOD: ABNORMAL
EOSINOPHIL # BLD AUTO: 0.1 K/UL
EOSINOPHIL NFR BLD: 0.7 %
ERYTHROCYTE [DISTWIDTH] IN BLOOD BY AUTOMATED COUNT: 13.5 %
EST. GFR  (AFRICAN AMERICAN): 21 ML/MIN/1.73 M^2
EST. GFR  (NON AFRICAN AMERICAN): 18.2 ML/MIN/1.73 M^2
GLUCOSE SERPL-MCNC: 109 MG/DL
HCT VFR BLD AUTO: 33.4 %
HGB BLD-MCNC: 11.2 G/DL
INR PPP: 1
LYMPHOCYTES # BLD AUTO: 1.2 K/UL
LYMPHOCYTES NFR BLD: 13.6 %
MAGNESIUM SERPL-MCNC: 1.9 MG/DL
MCH RBC QN AUTO: 28.8 PG
MCHC RBC AUTO-ENTMCNC: 33.5 %
MCV RBC AUTO: 86 FL
MONOCYTES # BLD AUTO: 0.8 K/UL
MONOCYTES NFR BLD: 8.9 %
NEUTROPHILS # BLD AUTO: 6.9 K/UL
NEUTROPHILS NFR BLD: 75.9 %
PHOSPHATE SERPL-MCNC: 3.8 MG/DL
PLATELET # BLD AUTO: 233 K/UL
PMV BLD AUTO: 9.1 FL
POTASSIUM SERPL-SCNC: 4.7 MMOL/L
PROT SERPL-MCNC: 7.8 G/DL
PROTHROMBIN TIME: 10.8 SEC
RBC # BLD AUTO: 3.89 M/UL
SODIUM SERPL-SCNC: 122 MMOL/L
TSH SERPL DL<=0.005 MIU/L-ACNC: 2.02 UIU/ML
WBC # BLD AUTO: 9.11 K/UL

## 2017-02-09 PROCEDURE — 83036 HEMOGLOBIN GLYCOSYLATED A1C: CPT

## 2017-02-09 PROCEDURE — 99999 PR PBB SHADOW E&M-EST. PATIENT-LVL II: CPT | Mod: PBBFAC,,, | Performed by: NURSE PRACTITIONER

## 2017-02-09 PROCEDURE — 80053 COMPREHEN METABOLIC PANEL: CPT

## 2017-02-09 PROCEDURE — 99223 1ST HOSP IP/OBS HIGH 75: CPT | Mod: ,,, | Performed by: INTERNAL MEDICINE

## 2017-02-09 PROCEDURE — 84443 ASSAY THYROID STIM HORMONE: CPT

## 2017-02-09 PROCEDURE — 86850 RBC ANTIBODY SCREEN: CPT

## 2017-02-09 PROCEDURE — 25000003 PHARM REV CODE 250: Performed by: STUDENT IN AN ORGANIZED HEALTH CARE EDUCATION/TRAINING PROGRAM

## 2017-02-09 PROCEDURE — 93010 ELECTROCARDIOGRAM REPORT: CPT | Mod: ,,, | Performed by: INTERNAL MEDICINE

## 2017-02-09 PROCEDURE — 3078F DIAST BP <80 MM HG: CPT | Mod: S$GLB,,, | Performed by: NURSE PRACTITIONER

## 2017-02-09 PROCEDURE — 63600175 PHARM REV CODE 636 W HCPCS: Performed by: STUDENT IN AN ORGANIZED HEALTH CARE EDUCATION/TRAINING PROGRAM

## 2017-02-09 PROCEDURE — 82378 CARCINOEMBRYONIC ANTIGEN: CPT

## 2017-02-09 PROCEDURE — 85610 PROTHROMBIN TIME: CPT

## 2017-02-09 PROCEDURE — 3075F SYST BP GE 130 - 139MM HG: CPT | Mod: S$GLB,,, | Performed by: NURSE PRACTITIONER

## 2017-02-09 PROCEDURE — 86900 BLOOD TYPING SEROLOGIC ABO: CPT

## 2017-02-09 PROCEDURE — 84100 ASSAY OF PHOSPHORUS: CPT

## 2017-02-09 PROCEDURE — 83735 ASSAY OF MAGNESIUM: CPT

## 2017-02-09 PROCEDURE — 93005 ELECTROCARDIOGRAM TRACING: CPT

## 2017-02-09 PROCEDURE — 99214 OFFICE O/P EST MOD 30 MIN: CPT | Mod: S$GLB,,, | Performed by: NURSE PRACTITIONER

## 2017-02-09 PROCEDURE — 36415 COLL VENOUS BLD VENIPUNCTURE: CPT

## 2017-02-09 PROCEDURE — 85025 COMPLETE CBC W/AUTO DIFF WBC: CPT

## 2017-02-09 PROCEDURE — 85730 THROMBOPLASTIN TIME PARTIAL: CPT

## 2017-02-09 PROCEDURE — 20600001 HC STEP DOWN PRIVATE ROOM

## 2017-02-09 PROCEDURE — 86703 HIV-1/HIV-2 1 RESULT ANTBDY: CPT

## 2017-02-09 RX ORDER — GABAPENTIN 300 MG/1
300 CAPSULE ORAL 3 TIMES DAILY
Status: DISCONTINUED | OUTPATIENT
Start: 2017-02-09 | End: 2017-02-17 | Stop reason: HOSPADM

## 2017-02-09 RX ORDER — HYDROCODONE BITARTRATE AND ACETAMINOPHEN 10; 325 MG/1; MG/1
1 TABLET ORAL EVERY 4 HOURS PRN
Status: DISCONTINUED | OUTPATIENT
Start: 2017-02-09 | End: 2017-02-09

## 2017-02-09 RX ORDER — FENTANYL 50 UG/1
1 PATCH TRANSDERMAL
Status: DISCONTINUED | OUTPATIENT
Start: 2017-02-09 | End: 2017-02-11

## 2017-02-09 RX ORDER — QUINAPRIL 20 MG/1
20 TABLET ORAL NIGHTLY
Status: DISCONTINUED | OUTPATIENT
Start: 2017-02-09 | End: 2017-02-09

## 2017-02-09 RX ORDER — DEXAMETHASONE SODIUM PHOSPHATE 4 MG/ML
8 INJECTION, SOLUTION INTRA-ARTICULAR; INTRALESIONAL; INTRAMUSCULAR; INTRAVENOUS; SOFT TISSUE EVERY 12 HOURS
Status: DISCONTINUED | OUTPATIENT
Start: 2017-02-09 | End: 2017-02-10

## 2017-02-09 RX ORDER — INSULIN ASPART 100 [IU]/ML
0-5 INJECTION, SOLUTION INTRAVENOUS; SUBCUTANEOUS EVERY 6 HOURS PRN
Status: DISCONTINUED | OUTPATIENT
Start: 2017-02-09 | End: 2017-02-17 | Stop reason: HOSPADM

## 2017-02-09 RX ORDER — ONDANSETRON 8 MG/1
8 TABLET, ORALLY DISINTEGRATING ORAL EVERY 8 HOURS PRN
Status: DISCONTINUED | OUTPATIENT
Start: 2017-02-09 | End: 2017-02-17 | Stop reason: HOSPADM

## 2017-02-09 RX ORDER — DEXAMETHASONE 4 MG/1
4 TABLET ORAL EVERY 6 HOURS
Status: DISCONTINUED | OUTPATIENT
Start: 2017-02-10 | End: 2017-02-09

## 2017-02-09 RX ORDER — OXYCODONE HYDROCHLORIDE 5 MG/1
10 TABLET ORAL EVERY 4 HOURS PRN
Status: DISCONTINUED | OUTPATIENT
Start: 2017-02-09 | End: 2017-02-13

## 2017-02-09 RX ORDER — HYDROMORPHONE HYDROCHLORIDE 2 MG/ML
2 INJECTION, SOLUTION INTRAMUSCULAR; INTRAVENOUS; SUBCUTANEOUS ONCE
Status: COMPLETED | OUTPATIENT
Start: 2017-02-09 | End: 2017-02-09

## 2017-02-09 RX ORDER — TAMSULOSIN HYDROCHLORIDE 0.4 MG/1
0.4 CAPSULE ORAL DAILY
Status: DISCONTINUED | OUTPATIENT
Start: 2017-02-10 | End: 2017-02-10

## 2017-02-09 RX ORDER — SODIUM CHLORIDE 9 MG/ML
INJECTION, SOLUTION INTRAVENOUS CONTINUOUS
Status: DISCONTINUED | OUTPATIENT
Start: 2017-02-09 | End: 2017-02-14

## 2017-02-09 RX ORDER — METOPROLOL SUCCINATE 50 MG/1
200 TABLET, EXTENDED RELEASE ORAL 2 TIMES DAILY
Status: DISCONTINUED | OUTPATIENT
Start: 2017-02-09 | End: 2017-02-11

## 2017-02-09 RX ORDER — GLUCAGON 1 MG
1 KIT INJECTION
Status: DISCONTINUED | OUTPATIENT
Start: 2017-02-09 | End: 2017-02-17 | Stop reason: HOSPADM

## 2017-02-09 RX ORDER — IBUPROFEN 200 MG
16 TABLET ORAL
Status: DISCONTINUED | OUTPATIENT
Start: 2017-02-09 | End: 2017-02-17 | Stop reason: HOSPADM

## 2017-02-09 RX ORDER — IBUPROFEN 200 MG
24 TABLET ORAL
Status: DISCONTINUED | OUTPATIENT
Start: 2017-02-09 | End: 2017-02-17 | Stop reason: HOSPADM

## 2017-02-09 RX ADMIN — GABAPENTIN 300 MG: 300 CAPSULE ORAL at 08:02

## 2017-02-09 RX ADMIN — OXYCODONE HYDROCHLORIDE 10 MG: 5 TABLET ORAL at 08:02

## 2017-02-09 RX ADMIN — METOPROLOL SUCCINATE 200 MG: 50 TABLET, EXTENDED RELEASE ORAL at 08:02

## 2017-02-09 RX ADMIN — HYDROMORPHONE HYDROCHLORIDE 2 MG: 2 INJECTION, SOLUTION INTRAMUSCULAR; INTRAVENOUS; SUBCUTANEOUS at 09:02

## 2017-02-09 NOTE — PROGRESS NOTES
Subjective:       Patient ID: Rah Puente is a 61 y.o. male.    Chief Complaint: Neck Pain; Extremity Weakness; and Results (CT CAP/Bone Scan)    Neck Pain    Associated symptoms include numbness and weakness. Pertinent negatives include no chest pain, fever, headaches or trouble swallowing.   Extremity Weakness    Associated symptoms include numbness. Pertinent negatives include no fever.      61 year old male,patient of Dr. Goetz, to clinic today for urgent care visit for upper extremity weakness and neck pain. Pt reports he first noticed right upper extremity weakness 2 weeks ago and left upper extremity weakness began 1 week ago. Pt states he is unable to lift his arms above his shoulders. Additionally, he reports unsteady gait and recent fall hitting his left forehead. Pt reports neck pain and difficulty straightening his neck. Pt endorses prior neuropathy to hands and feet. Pt is currently awaiting a bone biopsy after recent staging scans showed diffuse, extensive, metastatic disease.  Pt has a history of Stage I colon cancer and underwent colectomy in March 2016 for chronic intussusception. It appears he did not have any oncologic follow-up afterwards.     He denies any mouth sores, nausea, vomiting, diarrhea, constipation, abdominal pain, chest pain, shortness of breath, leg swelling, headache, dizziness, or mood changes.    His ECOG PS today is 3 and he is accompanied with a friend to clinic. He is originally from [place] and moved to [place] 10 years ago. He is the  of [facility]. Previous smoker, quit 4 years ago.    Oncologic History (From Chart/Patient):  In March of 2016, patient underwent colectomy with Dr. Montague after CT scan of abdomen by his PCP showed intussusception of proximal and transverse colon. Pathology revealed villous adenoma with malignant invasion, Stage 1(pT2pN0), low grade.   CT from 1/21/17 revealed Multiple lytic lesions in the lower cervical/upper  thoracic spine, and multiple lung nodules, as described above.  These findings are concerning for metastatic disease.Fracture of the distal portion of the C6 spinous process, commonly known as Reed shovelers fracture.Compression fracture of the C5 vertebral body with moderate height loss.Mild spinal canal stenosis and moderate bilateral neuroforaminal stenosis at C4-C5.    Review of Systems   Constitutional: Positive for appetite change. Negative for activity change, fatigue, fever and unexpected weight change.   HENT: Negative for mouth sores, nosebleeds and trouble swallowing.    Eyes: Negative for discharge and visual disturbance.   Respiratory: Negative for cough, shortness of breath and wheezing.    Cardiovascular: Negative for chest pain and leg swelling.   Gastrointestinal: Positive for abdominal pain. Negative for abdominal distention, blood in stool, constipation, diarrhea, nausea and vomiting.   Genitourinary: Negative for decreased urine volume, difficulty urinating, frequency and hematuria.   Musculoskeletal: Positive for back pain, extremity weakness, gait problem, neck pain and neck stiffness.   Skin: Negative for color change and rash.   Neurological: Positive for weakness and numbness. Negative for headaches.   Hematological: Negative for adenopathy.   Psychiatric/Behavioral: Negative for sleep disturbance. The patient is not nervous/anxious.    All other systems reviewed and are negative.      Allergies:  Review of patient's allergies indicates:  No Known Allergies    Medications:  Current Outpatient Prescriptions   Medication Sig Dispense Refill    fentaNYL (DURAGESIC) 50 mcg/hr Place 1 patch onto the skin every 72 hours. 5 patch 0    gabapentin (NEURONTIN) 300 MG capsule Take 300 mg by mouth 2 (two) times daily.      hydrocodone-acetaminophen 10-325mg (NORCO)  mg Tab Take 1 tablet by mouth every 4 (four) hours as needed for Pain. 90 tablet 0    metformin (GLUCOPHAGE) 500 MG tablet Take  500 mg by mouth 2 (two) times daily with meals.      metoprolol succinate (TOPROL-XL) 200 MG 24 hr tablet Take 200 mg by mouth 2 (two) times daily.      quinapril (ACCUPRIL) 20 MG tablet Take 20 mg by mouth every evening.      tamsulosin (FLOMAX) 0.4 mg Cp24 Take 0.4 mg by mouth once daily.       No current facility-administered medications for this visit.        PMH:  Past Medical History   Diagnosis Date    Alcohol abuse     BPH (benign prostatic hyperplasia)     Diabetes mellitus     Hypertension     Hyponatremia        PSH:  Past Surgical History   Procedure Laterality Date    Hernia repair         FamHx:  No family history on file.    SocHx:  Social History     Social History    Marital status: Single     Spouse name: N/A    Number of children: N/A    Years of education: N/A     Occupational History    Not on file.     Social History Main Topics    Smoking status: Former Smoker     Packs/day: 1.00     Types: Cigarettes     Quit date: 12/7/2014    Smokeless tobacco: Not on file    Alcohol use 2.4 oz/week     4 Shots of liquor per week      Comment: vodka-nightly    Drug use: No    Sexual activity: Not on file     Other Topics Concern    Not on file     Social History Narrative     Objective:      Physical Exam   Constitutional: He is oriented to person, place, and time. He appears well-developed and well-nourished.   HENT:   Head: Normocephalic and atraumatic.   Mouth/Throat: Uvula is midline.   Eyes: Conjunctivae and EOM are normal. Pupils are equal, round, and reactive to light.   Neck: Trachea normal.   Cardiovascular: Normal rate, regular rhythm, normal heart sounds and normal pulses.    No swelling noted to bilateral lower extremities.   Pulmonary/Chest: Effort normal and breath sounds normal. No respiratory distress. He has no wheezes. He has no rales.   Abdominal: Soft. Normal appearance and bowel sounds are normal. He exhibits no distension. There is no tenderness. There is no rebound  and no guarding.   Neurological: He is alert and oriented to person, place, and time. He exhibits abnormal muscle tone.   Noticeably decreased hand and motor strength of upper extremities. No pain with forced movement of extremities above shoulders.   Skin: Skin is warm, dry and intact. He is not diaphoretic.   Psychiatric: He has a normal mood and affect. His speech is normal and behavior is normal.   Nursing note and vitals reviewed.      LABS:  WBC   Date Value Ref Range Status   02/03/2017 9.45 3.90 - 12.70 K/uL Final     Hemoglobin   Date Value Ref Range Status   02/03/2017 11.1 (L) 14.0 - 18.0 g/dL Final     Hematocrit   Date Value Ref Range Status   02/03/2017 32.7 (L) 40.0 - 54.0 % Final     Platelets   Date Value Ref Range Status   02/03/2017 190 150 - 350 K/uL Final       Chemistry        Component Value Date/Time     (L) 02/03/2017 1055    K 4.4 02/03/2017 1055    CL 82 (L) 02/03/2017 1055    CO2 30 (H) 02/03/2017 1055    BUN 27 (H) 02/03/2017 1055    CREATININE 1.7 (H) 02/03/2017 1055     (H) 02/03/2017 1055        Component Value Date/Time    CALCIUM 8.8 02/03/2017 1055    ALKPHOS 285 (H) 02/03/2017 1055    AST 52 (H) 02/03/2017 1055    ALT 16 02/03/2017 1055    BILITOT 0.6 02/03/2017 1055          Assessment:       1. Cancer, metastatic    2. Malignant neoplasm metastatic to lung, unspecified laterality    3. Bone metastases    4. 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 Upper extremity weakness    7. History of colon cancer, stage I        Plan:       1,2,3,4,5,6,7-   61 year old male, referred by Dr. Paula, for consultation after recent CT imaging showed several lytic lesions in the spine and pulmonary nodules that are concerning for metastatic disease. Pt was having CT scans performed due to worsening neck/back pain after falling in his house in December. Pt has a history of Stage I colon cancer and underwent colectomy  in March 2016 for chronic intussusception. It appears he did not have any oncologic follow-up afterwards. Recent CT scans personally reviewed by myself and Dr. Goetz. Discussed with the patient that it appears that he has widely metastatic cancer of unknown origin. Awaiting tissue biopsy to be performed. Due to new onset upper extremity weakness and neck pain, will admit patient for further workup. Discussed with patient and his friend that we will need further imaging with MRI and possible radiation oncology consultation.       Patient was also seen and examined by Dr. Goetz. Patient is in agreement with the proposed treatment plan. All questions were answered to the patient's satisfaction.    ANI Thompson  Hematology and Medical Oncology

## 2017-02-09 NOTE — H&P
History & Physical  Medical Oncology    SUBJECTIVE:   Chief Complaint/Reason for Admission: Progressive UE weakness and neck pain and weakness    History of Present Illness:  Rah Puente is a 61 y.o. male with PMH of  Stage I colon CA, s/p colectomy in March 2016, HTN, DM2, and BPH who presented to clinic for neck pain/weakness and UE weakness. Recent CT imaging and bone scan concerning for mets and patient admitted to inpatient for further workup. Patient states he first noted worsening UE weakness 2 weeks prior 1/27/17 which began in his right shoulder and progressed down his arm to his hand. The following week he noted similar symptoms of the left arm. Patient also noted worsening neck weakness and pain as he was unable to lift his neck. Of note, patient with worsening lower extremity weakness which he states first started 1 month prior and had worsening gait, s/p fall.  Patient also endorses UE and LE neuropathy.    Patient did not have oncologic f/u after colectomy for Stage I colon cancer and was recently referred by ortho as patient with worsening neck/back pain and scans concerning for metastatic disease.     Oncological Hx:  In March of 2016, patient underwent colectomy with Dr. Montague after CT scan of abdomen by his PCP showed intussusception of proximal and transverse colon. Pathology revealed villous adenoma with malignant invasion, Stage 1(pT2pN0), low grade.   Patient referred to oncology by Dr. Paula for consultation after recent CT imagining obtained for worsening neck/back pain after falling in his house in December and landing on his coffee table:  CT from 1/21/17 revealed Multiple lytic lesions in the lower cervical/upper thoracic spine, and multiple lung nodules, as described above.  These findings are concerning for metastatic disease.Fracture of the distal portion of the C6 spinous process, commonly known as Reed shovelers fracture.Compression fracture of the C5 vertebral body  with moderate height loss.Mild spinal canal stenosis and moderate bilateral neuroforaminal stenosis at C4-C5.  1/30/17 CT C/A/P: Extensive metastatic disease involving the lungs, skeleton, abdominal/retroperitoneal lymph nodes, and left adrenal gland. Notable lytic lesion in the S2 vertebral body not present on 2/16/16.  1/30/17 Bone scan: metastatic disease involving the skull, C-spine, T-spine, L-spine, pelvis, ribs, right manubrium, left clavicle, and proximal right humerus.  Renal, bladder, and soft tissue activity demonstrates a pelvic kidney.    Past Medical History   Diagnosis Date    Alcohol abuse     BPH (benign prostatic hyperplasia)     Diabetes mellitus     Hypertension     Hyponatremia       Past Surgical History   Procedure Laterality Date    Hernia repair        No current facility-administered medications on file prior to encounter.      Current Outpatient Prescriptions on File Prior to Encounter   Medication Sig Dispense Refill    fentaNYL (DURAGESIC) 50 mcg/hr Place 1 patch onto the skin every 72 hours. 5 patch 0    gabapentin (NEURONTIN) 300 MG capsule Take 300 mg by mouth 2 (two) times daily.      hydrocodone-acetaminophen 10-325mg (NORCO)  mg Tab Take 1 tablet by mouth every 4 (four) hours as needed for Pain. 90 tablet 0    metformin (GLUCOPHAGE) 500 MG tablet Take 500 mg by mouth 2 (two) times daily with meals.      metoprolol succinate (TOPROL-XL) 200 MG 24 hr tablet Take 200 mg by mouth 2 (two) times daily.      quinapril (ACCUPRIL) 20 MG tablet Take 20 mg by mouth every evening.      tamsulosin (FLOMAX) 0.4 mg Cp24 Take 0.4 mg by mouth once daily.        Review of patient's allergies indicates:  No Known Allergies    No family history on file.    reports that he quit smoking about 2 years ago. His smoking use included Cigarettes. He smoked 1.00 pack per day. He does not have any smokeless tobacco history on file. He reports that he drinks about 2.4 oz of alcohol per week   He reports that he does not use illicit drugs.     Review of Systems:   Constitutional: Endorses, weight loss, weakness, Denies fevers, chills.  Eyes: Denies changes in vision.  ENT: Denies dysphagia, nasal discharge, ear pain or discharge.  Cardiovascular: Denies chest pain, palpitations, orthopnea, or claudication.  Respiratory: Denies shortness of breath, cough, hemoptysis, or wheezing.  GI: Denies nausea/vomiting, hematochezia, melena, abdominal pain, or changes in bowel movements  : Denies dysuria, incontinence, or hematuria.  Musculoskeletal: Endorses myalgias. + Neck pain  Skin: New erythematous maculopapular rash, denies itching or pain..  Neurologic: Endorses burning sensation from shoulders to elbows and knees to feet. Endorses UE and LE weakness.   Denies headache, dizziness, vertigo.   Hematologic/Lymph: Denies lymphadenopathy, easy bruising or easy bleeding.     OBJECTIVE:     Vital Signs (Most Recent):    Vital Signs (24h Range):  Temp:  [98.2 °F (36.8 °C)] 98.2 °F (36.8 °C)  Pulse:  [73] 73  BP: (139)/(68) 139/68     I & O (24h):  No intake or output data in the 24 hours ending 02/09/17 1602     Physical Exam:  Constitutional: He is oriented to person, place, and time. No acute distress.   HEENT: Normocephalic, linear scab approximately 5 cm in length, no active discharge. Uvula is midline. Conjunctivae and EOM are normal. Pupils are equal, round, and reactive to light. Neck favoring right side looking down with difficulty lifting next and turning fully to the right.   Cardiovascular: Normal rate, regular rhythm, normal heart sounds and normal pulses.   No swelling noted to bilateral lower extremities.   Pulmonary/Chest: Effort normal and breath sounds normal. No respiratory distress. He has no wheezes. He has no rales.   Abdominal: Soft. Normal appearance and bowel sounds are normal. He exhibits no distension. There is no tenderness. There is no rebound and no guarding.   Neurological: He is alert  and oriented to person, place, and time.   Noticeably decreased hand and motor strength of upper extremitie. No pain with forced movement of extremities above shoulders.   Bilateral UE: 2/5 lifting arms, 4/5 flexion and extension.   Bilateral LE: 4/5 strength hip flexion and knee flexion/extension  Finger to nose in tact.   Skin: Skin is warm, dry and intact. He is not diaphoretic. Erythematous maculopapular rash worse at dorsum of bilateral hands but tracing up the forearms bilaterally    Psychiatric: He has a normal mood and affect. His speech is normal and behavior is normal.     Laboratory:  CBC/Anemia Labs: Coags:      Recent Labs  Lab 02/03/17  1055   WBC 9.45   HGB 11.1*   HCT 32.7*      MCV 87   RDW 13.7    No results for input(s): INR, APTT in the last 168 hours.    Invalid input(s): PT     Chemistries: ABG:     Recent Labs  Lab 02/03/17  1055   *   K 4.4   CL 82*   CO2 30*   BUN 27*   CREATININE 1.7*   CALCIUM 8.8   PROT 7.4   BILITOT 0.6   ALKPHOS 285*   ALT 16   AST 52*    No results for input(s): PH, PCO2, PO2, HCO3, POCSATURATED, BE in the last 168 hours.     POCT Glucose: HbA1c:    No results for input(s): POCTGLUCOSE in the last 168 hours. Hemoglobin A1C   Date Value Ref Range Status   03/13/2016 5.8 4.5 - 6.2 % Final   03/12/2016 5.8 4.5 - 6.2 % Final   05/06/2012 5.4 4.0 - 6.2 % Final        Cardiac Enzymes: Ejection Fractions:    No results for input(s): CPK, CPKMB, MB, TROPONINI in the last 72 hours. No results found for: EF     Diagnostic Results:  1/30/17 Bone scan: metastatic disease involving the skull, C-spine, T-spine, L-spine, pelvis, ribs, right manubrium, left clavicle, and proximal right humerus.  Renal, bladder, and soft tissue activity demonstrates a pelvic kidney.    1/30/17 CT C/A/P: Extensive metastatic disease involving the lungs, skeleton, abdominal/retroperitoneal lymph nodes, and left adrenal gland. Notable lytic lesion in the S2 vertebral body not present on  "2/16/16.    ASSESSMENT/PLAN:     Present on Admission:   Upper extremity weakness    Mr. Puente is a 62 y/o man with hx of colon CA (originally Stage I T2, N0), HTN, DM2, and BPH who presented with worsening UE weakness and imaging concerning for metastasis. ECOG 3.       Stage IV Metastatic CA, suspect recurrent colon cancer   - patient with intussusception s/p colectomy on 3/10/16, path report showing invasive well-differentiated adenocarcinoma extending into muscularis propria, T2, N0  - patient then lost to follow up as he was told it was completely resected  - presented to ortho for worsening neck/back pain. CT 1/21/17 revealed "multiple lytic lesions in the lower cervical/upper thoracic spine, and multiple lung nodules." Referred to Dr. Goetz for concern for metastatic disease   - f/u CEA   - f/u MRI brain, C-spine, T-spine, L-spine  - pending results if imaging, will consult NSGY vs IR for biopsy   - decadron 8 mg bid   - oxy 10 mg q4h prn for pain and fentanyl patch   - gabapentin 300 mg tid for neuropathic pain   - PT/OT    Essential HTN   - continue home metoprolol 200 mg bid  - will hold home quinapril 20 mg qHS in setting of AGUILAR     DM2  - on metformin at home   - will start on SSI   - gabapentin 300 mg tid for neuropathic pain     BPH  - continue Flomax 0.4 mg daily     AGUILAR  - Cr 1.7 on 2/3, baseline around 1   - repeat CMP   - likely pre-renal as also hyponatremic   - will provide 1 L bolus and start on NS @ 100     Hyponatremia  - likely hypovolemic hyponatremia  - will provide IVF   - will monitor     DVT ppx: TEDs, SCDs, holding Lovenox for possible surgical intervention   Diet: NPO for now in setting of possible surgery   Code: Full    Staff attestation to follow.     Carmen Yoder MD, PGY1          ATTENDING NOTE, ONCOLOGY INPATIENT TEAM    As per Dr. Huffman's note; please refer to my note of same day for our assessment and plan.  "

## 2017-02-09 NOTE — PROGRESS NOTES
ATTENDING NOTE, ONCOLOGY INPATIENT TEAM    Patient seen and examined, radiologic studies reviewed with radiologist, chart reviewed, case discussed with his primary oncologist (Dr Goetz). Full note to follow by housestaff.  Briefly, this is a 61 year old male with prior history of early stage colon cancer 10 months ago, who recently presented with neck pain.  A CT scan showed extensive metastatic disease involving his lungs and C spine, and he was sent to see Dr. Goetz. Further workup showed multiple bony metastases in his T spine, L2 vertebra, and left iliac wing.  No tissue diagnosis has been obtained yet.    The reason for today's visit to the Clinic and the subsequent admission is that over the last week he has developed bilateral upper extremity weakness.    In examination he is alert, oriented x 3, in NAD.  He is accompanied by a friend.  Lungs are clear.    Abdomen is soft, nontender.  He has significant bilateral upper extremity weakness, and is unable to raise his arms above the shoulder level.  Labs reviewed.    PLAN    Admit for further workup.  STAT MRI of the head, C spine, and T spine.  May need neurosurgical consultation depending on the results of hs C spine MRI.  Dexamethasone 8 mg IV Q 12 hours for now.  Continue analgesics.  We will follow.  Her questions were answered to her satisfaction.

## 2017-02-10 ENCOUNTER — ANESTHESIA EVENT (OUTPATIENT)
Dept: SURGERY | Facility: HOSPITAL | Age: 62
DRG: 472 | End: 2017-02-10
Payer: COMMERCIAL

## 2017-02-10 PROBLEM — C79.51 METASTATIC CANCER TO BONE: Status: ACTIVE | Noted: 2017-02-10

## 2017-02-10 LAB
ABO + RH BLD: NORMAL
ALBUMIN SERPL BCP-MCNC: 2.8 G/DL
ALP SERPL-CCNC: 295 U/L
ALT SERPL W/O P-5'-P-CCNC: 13 U/L
ANION GAP SERPL CALC-SCNC: 14 MMOL/L
AST SERPL-CCNC: 46 U/L
BASOPHILS # BLD AUTO: 0.01 K/UL
BASOPHILS NFR BLD: 0.1 %
BILIRUB SERPL-MCNC: 0.6 MG/DL
BLD GP AB SCN CELLS X3 SERPL QL: NORMAL
BUN SERPL-MCNC: 48 MG/DL
CALCIUM SERPL-MCNC: 9 MG/DL
CHLORIDE SERPL-SCNC: 81 MMOL/L
CO2 SERPL-SCNC: 27 MMOL/L
CREAT SERPL-MCNC: 3.1 MG/DL
CREAT UR-MCNC: 74 MG/DL
DIFFERENTIAL METHOD: ABNORMAL
EOSINOPHIL # BLD AUTO: 0 K/UL
EOSINOPHIL NFR BLD: 0.5 %
ERYTHROCYTE [DISTWIDTH] IN BLOOD BY AUTOMATED COUNT: 13.4 %
EST. GFR  (AFRICAN AMERICAN): 23.5 ML/MIN/1.73 M^2
EST. GFR  (NON AFRICAN AMERICAN): 20.3 ML/MIN/1.73 M^2
ESTIMATED AVG GLUCOSE: 140 MG/DL
GLUCOSE SERPL-MCNC: 140 MG/DL
HBA1C MFR BLD HPLC: 6.5 %
HCT VFR BLD AUTO: 30.5 %
HGB BLD-MCNC: 10.6 G/DL
HIV 1+2 AB+HIV1 P24 AG SERPL QL IA: NEGATIVE
LYMPHOCYTES # BLD AUTO: 0.7 K/UL
LYMPHOCYTES NFR BLD: 8.5 %
MAGNESIUM SERPL-MCNC: 1.8 MG/DL
MCH RBC QN AUTO: 29.4 PG
MCHC RBC AUTO-ENTMCNC: 34.8 %
MCV RBC AUTO: 85 FL
MONOCYTES # BLD AUTO: 0.7 K/UL
MONOCYTES NFR BLD: 8.1 %
NEUTROPHILS # BLD AUTO: 6.5 K/UL
NEUTROPHILS NFR BLD: 81.9 %
PHOSPHATE SERPL-MCNC: 3.9 MG/DL
PLATELET # BLD AUTO: 202 K/UL
PMV BLD AUTO: 9 FL
POCT GLUCOSE: 147 MG/DL (ref 70–110)
POCT GLUCOSE: 289 MG/DL (ref 70–110)
POTASSIUM SERPL-SCNC: 4.8 MMOL/L
PROSTATE SPECIFIC ANTIGEN, TOTAL: 0.33 NG/ML
PROT SERPL-MCNC: 7 G/DL
PSA FREE MFR SERPL: 27.27 %
PSA FREE SERPL-MCNC: 0.09 NG/ML
RBC # BLD AUTO: 3.61 M/UL
SODIUM SERPL-SCNC: 122 MMOL/L
SODIUM UR-SCNC: 35 MMOL/L
WBC # BLD AUTO: 7.99 K/UL

## 2017-02-10 PROCEDURE — 97530 THERAPEUTIC ACTIVITIES: CPT

## 2017-02-10 PROCEDURE — 63600175 PHARM REV CODE 636 W HCPCS: Performed by: STUDENT IN AN ORGANIZED HEALTH CARE EDUCATION/TRAINING PROGRAM

## 2017-02-10 PROCEDURE — 80053 COMPREHEN METABOLIC PANEL: CPT

## 2017-02-10 PROCEDURE — 36415 COLL VENOUS BLD VENIPUNCTURE: CPT

## 2017-02-10 PROCEDURE — 99232 SBSQ HOSP IP/OBS MODERATE 35: CPT | Mod: ,,, | Performed by: INTERNAL MEDICINE

## 2017-02-10 PROCEDURE — 99222 1ST HOSP IP/OBS MODERATE 55: CPT | Mod: ,,, | Performed by: ORTHOPAEDIC SURGERY

## 2017-02-10 PROCEDURE — 82570 ASSAY OF URINE CREATININE: CPT

## 2017-02-10 PROCEDURE — 20600001 HC STEP DOWN PRIVATE ROOM

## 2017-02-10 PROCEDURE — 84300 ASSAY OF URINE SODIUM: CPT

## 2017-02-10 PROCEDURE — 99253 IP/OBS CNSLTJ NEW/EST LOW 45: CPT | Mod: ,,, | Performed by: RADIOLOGY

## 2017-02-10 PROCEDURE — 25000003 PHARM REV CODE 250: Performed by: STUDENT IN AN ORGANIZED HEALTH CARE EDUCATION/TRAINING PROGRAM

## 2017-02-10 PROCEDURE — 85025 COMPLETE CBC W/AUTO DIFF WBC: CPT

## 2017-02-10 PROCEDURE — 97161 PT EVAL LOW COMPLEX 20 MIN: CPT

## 2017-02-10 PROCEDURE — 93010 ELECTROCARDIOGRAM REPORT: CPT | Mod: ,,, | Performed by: INTERNAL MEDICINE

## 2017-02-10 PROCEDURE — 84100 ASSAY OF PHOSPHORUS: CPT

## 2017-02-10 PROCEDURE — 83735 ASSAY OF MAGNESIUM: CPT

## 2017-02-10 PROCEDURE — 97166 OT EVAL MOD COMPLEX 45 MIN: CPT

## 2017-02-10 PROCEDURE — 93005 ELECTROCARDIOGRAM TRACING: CPT

## 2017-02-10 PROCEDURE — 84153 ASSAY OF PSA TOTAL: CPT

## 2017-02-10 PROCEDURE — 51798 US URINE CAPACITY MEASURE: CPT

## 2017-02-10 RX ORDER — TAMSULOSIN HYDROCHLORIDE 0.4 MG/1
0.4 CAPSULE ORAL 2 TIMES DAILY
Status: DISCONTINUED | OUTPATIENT
Start: 2017-02-10 | End: 2017-02-17 | Stop reason: HOSPADM

## 2017-02-10 RX ORDER — HYDRALAZINE HYDROCHLORIDE 25 MG/1
25 TABLET, FILM COATED ORAL EVERY 8 HOURS PRN
Status: DISCONTINUED | OUTPATIENT
Start: 2017-02-10 | End: 2017-02-14

## 2017-02-10 RX ORDER — DEXAMETHASONE 4 MG/1
8 TABLET ORAL EVERY 12 HOURS
Status: DISCONTINUED | OUTPATIENT
Start: 2017-02-10 | End: 2017-02-17 | Stop reason: HOSPADM

## 2017-02-10 RX ORDER — AMOXICILLIN 250 MG
1 CAPSULE ORAL DAILY
Status: DISCONTINUED | OUTPATIENT
Start: 2017-02-10 | End: 2017-02-17 | Stop reason: HOSPADM

## 2017-02-10 RX ADMIN — TAMSULOSIN HYDROCHLORIDE 0.4 MG: 0.4 CAPSULE ORAL at 08:02

## 2017-02-10 RX ADMIN — HYDRALAZINE HYDROCHLORIDE 25 MG: 25 TABLET ORAL at 12:02

## 2017-02-10 RX ADMIN — METOPROLOL SUCCINATE 200 MG: 50 TABLET, EXTENDED RELEASE ORAL at 09:02

## 2017-02-10 RX ADMIN — SODIUM CHLORIDE: 0.9 INJECTION, SOLUTION INTRAVENOUS at 01:02

## 2017-02-10 RX ADMIN — OXYCODONE HYDROCHLORIDE 10 MG: 5 TABLET ORAL at 07:02

## 2017-02-10 RX ADMIN — DEXAMETHASONE SODIUM PHOSPHATE 8 MG: 4 INJECTION, SOLUTION INTRAMUSCULAR; INTRAVENOUS at 08:02

## 2017-02-10 RX ADMIN — OXYCODONE HYDROCHLORIDE 10 MG: 5 TABLET ORAL at 10:02

## 2017-02-10 RX ADMIN — METOPROLOL SUCCINATE 200 MG: 50 TABLET, EXTENDED RELEASE ORAL at 08:02

## 2017-02-10 RX ADMIN — SODIUM CHLORIDE 500 ML: 0.9 INJECTION, SOLUTION INTRAVENOUS at 01:02

## 2017-02-10 RX ADMIN — DEXAMETHASONE SODIUM PHOSPHATE 8 MG: 4 INJECTION, SOLUTION INTRAMUSCULAR; INTRAVENOUS at 01:02

## 2017-02-10 RX ADMIN — DEXAMETHASONE 8 MG: 4 TABLET ORAL at 09:02

## 2017-02-10 RX ADMIN — GABAPENTIN 300 MG: 300 CAPSULE ORAL at 01:02

## 2017-02-10 RX ADMIN — OXYCODONE HYDROCHLORIDE 10 MG: 5 TABLET ORAL at 11:02

## 2017-02-10 RX ADMIN — TAMSULOSIN HYDROCHLORIDE 0.4 MG: 0.4 CAPSULE ORAL at 09:02

## 2017-02-10 RX ADMIN — SODIUM CHLORIDE: 0.9 INJECTION, SOLUTION INTRAVENOUS at 12:02

## 2017-02-10 RX ADMIN — OXYCODONE HYDROCHLORIDE 10 MG: 5 TABLET ORAL at 06:02

## 2017-02-10 RX ADMIN — GABAPENTIN 300 MG: 300 CAPSULE ORAL at 06:02

## 2017-02-10 RX ADMIN — GABAPENTIN 300 MG: 300 CAPSULE ORAL at 09:02

## 2017-02-10 RX ADMIN — OXYCODONE HYDROCHLORIDE 10 MG: 5 TABLET ORAL at 03:02

## 2017-02-10 RX ADMIN — INSULIN ASPART 3 UNITS: 100 INJECTION, SOLUTION INTRAVENOUS; SUBCUTANEOUS at 11:02

## 2017-02-10 NOTE — PT/OT/SLP EVAL
"Occupational Therapy  Co-Evaluation with Physical Therapy    Rah Puente   MRN: [id number]   Admitting Diagnosis: Upper extremity weakness    OT Date of Treatment: 02/10/17   OT Start Time: 1145  OT Stop Time: 1219  OT Total Time (min): 34 min    Billable Minutes:  Evaluation 34    Diagnosis: Upper extremity weakness   metastatic C5 fracture; metastatic cancer to bone    Past Medical History   Diagnosis Date    Alcohol abuse     BPH (benign prostatic hyperplasia)     Diabetes mellitus     Hypertension     Hyponatremia       Past Surgical History   Procedure Laterality Date    Hernia repair         Referring physician: MD Michelle  Date referred to OT: 2/9/2017    General Precautions: Standard, fall, NPO  Orthopedic Precautions: N/A; spinal (assumed)  Braces: N/A (Pt with Aspen Collar at bedside; Pt refused to wear)    Patient History:  Living Environment  Living Environment Comment: Pt lives alone in shot-gun style home in the [facility]. Home has 5 JULIA with B hand rails. Home has tub/shower combo with shower bench. Pt uses sock aid for LB dressing. He reported 2 falls at home since Dec 2016. Pt is still working full time. Pt's significant other present for portion of evaluation (together 23 years- do not live together currently)  Equipment Currently Used at Home: bedside commode, shower chair, sock aid    Prior level of function:   Bed Mobility/Transfers: needs assist  Grooming: independent  Bathing: independent  Upper Body Dressing: independent  Lower Body Dressing: needs device (sock aid)  Toileting: independent  Homemaking Responsibilities: Yes  Occupation: Full time employment  Type of Occupation: [employer]   Dominant hand: right    Subjective:  Communicated with RN prior to session. Pt's S.O. Present for portion of session.   Pt reported "It all started in mid December when I fell when I was turning"  Chief Complaint: pain  Patient/Family stated goals: "For my pain to be a 3-4/10 with activity" "To " "get it together to do my will" "To get back to work 8 hours"    Pain Ratin/10 (in supine with HOB 30*)  Pain Addressed: Reposition, Distraction, Pre-medicate for activity  Pain Rating Post-Intervention:  (increased with activity)    Objective:  Patient found with: peripheral IV    Cognitive Exam:  Oriented to: Person, Place and Time  Follows Commands/attention: Follows multistep  commands  Communication: clear/fluent  Memory:  No Deficits noted  Safety awareness/insight to disability: impaired  Coping skills/emotional control: Appropriate to situation    Visual/perceptual:  Intact    Physical Exam:  Postural examination/scapula alignment: Rounded shoulder, Head forward and Posterior pelvic tilt  Skin integrity: Bruising of bridge of nose, L forehead and Thin  Edema: Moderate R elbow/bicep (radial aspect)    Sensation:   Impaired  light/touch R UE    Upper Extremity Range of Motion:  Right Upper Extremity: Deficits: for shoulder flex/ext/ER/IR  Left Upper Extremity: Deficits: for shoulder flex/ext/ER/IR    Upper Extremity Strength:  Right Upper Extremity: deficits; did not complete MMT 2/2 cervical   Left Upper Extremity: Deficits: did not complete 2/2 cervical   Strength: 4-/5 B    Fine motor coordination:   Intact  Left hand thumb/finger opposition skills, Right hand thumb/finger opposition skills, Left hand, manipulation of objects, Right hand, manipulation of objects and Right hand, graphomotor skills     Gross motor coordination: did not assess    Functional Mobility:  Bed Mobility:  Rolling/Turning Right: Maximum assistance (edu on log rolling technique)  Scooting/Bridging: Moderate Assistance (to bridge)  Supine to Sit: Maximum Assistance    Transfers:  Sit <> Stand Assistance: Total Assistance  Sit <> Stand Assistive Device: No Assistive Device  Bed <> Chair Technique: Stand Pivot  Bed <> Chair Transfer Assistance: Total Assistance  Bed <> Chair Assistive Device: No Assistive Device    Functional " "Ambulation: Total(a) for steps from bed<>couch    Activities of Daily Living:  Feeding Level of Assistance: Activity did not occur  UE Dressing Level of Assistance: Activity did not occur  LE Dressing Level of Assistance: Maximum assistance (Pants)(B socks)    Balance:   Static Sit: CGA; requires UE support  Dynamic Sit:Poor; CGA within normal planes of motion    Additional:  -Pt edu on OT role in care with verbalized understanding  -Discussed goals/POC    AM-PAC 6 CLICK ADL  How much help from another person does this patient currently need?  1 = Unable, Total/Dependent Assistance  2 = A lot, Maximum/Moderate Assistance  3 = A little, Minimum/Contact Guard/Supervision  4 = None, Modified [place]/Independent    Putting on and taking off regular lower body clothing? : 2  Bathing (including washing, rinsing, drying)?: 2  Toileting, which includes using toilet, bedpan, or urinal? : 2  Putting on and taking off regular upper body clothing?: 3  Taking care of personal grooming such as brushing teeth?: 3  Eating meals?: 3  Total Score: 15    AM-PAC Raw Score CMS "G-Code Modifier Level of Impairment Assistance   6 % Total / Unable   7 - 9 CM 80 - 100% Maximal Assist   10 - 14 CL 60 - 80% Moderate Assist   15 - 19 CK 40 - 60% Moderate Assist   20 - 22 CJ 20 - 40% Minimal Assist   23 CI 1-20% SBA / CGA   24 CH 0% Independent/ Mod I       Patient left seated on couch (using urinal) with all lines intact and call button in reach    Assessment:  Rah Puente is a 61 y.o. male with a medical diagnosis of Upper extremity weakness and presents with metastatic C5 fracture; metastatic cancer to bone. Pt with reported decrease in sustained grasp for L hand (ie: for coffee, drinks) and overall decline in strength and endurance for B UE. Pt reported pain radiates for middle of neck and to outter shoulders then down both arms (with and without activity). Pt demo decline in functional mobility, ADLs, self care and " overall decline in functional indep with all tasks and activities. Pt's medical course pending (possible laminectomy and radiation). He will cont to benefit from OT services to address functional task and safety with return home.     Rehab identified problem list/impairments: Rehab identified problem list/impairments: weakness, impaired endurance, impaired self care skills, impaired functional mobilty, gait instability, impaired balance, decreased upper extremity function, decreased lower extremity function, decreased safety awareness, pain, orthopedic precautions    Rehab potential is fair.    Activity tolerance: Fair (Pain limiting)    Discharge recommendations: Discharge Facility/Level Of Care Needs: home with home health, home health OT     Barriers to discharge: Barriers to Discharge: Inaccessible home environment, Decreased caregiver support (5 JULIA; lives alone)    Equipment recommendations: wheelchair     GOALS:   Occupational Therapy Goals        Problem: Occupational Therapy Goal    Goal Priority Disciplines Outcome Interventions   Occupational Therapy Goal     OT, PT/OT Ongoing (interventions implemented as appropriate)    Description:  Goals to be met by: 2/24     Patient will increase functional independence with ADLs by performing:    Demo understanding for spinal safety with bed mobility.  UE Dressing in sitting with Set-up Assistance and Stand-by Assistance.  LE Dressing with Set-up Assistance, Minimal Assistance and Assistive Devices as needed.  Grooming while seated at sink with Stand-by Assistance.  Supine to sit with Contact Guard Assistance.  Stand pivot transfers with Minimal Assistance.  Sustained grasp on functional item (L hand) for 3 minute duration with SBA.                PLAN:  Patient to be seen 4 x/week to address the above listed problems via self-care/home management, therapeutic activities, therapeutic exercises, neuromuscular re-education  Plan of Care expires: 03/12/17  Plan of  Care reviewed with: patient     ZORAIDA Villarreal  02/10/2017

## 2017-02-10 NOTE — PROGRESS NOTES
Dr. Johnson notified that pt had not urinated since he's been here. Told to bladder scan and if pt had >300 cc's to straight cath. Pt was bladder scanned and had >900 cc's of urine. Straight cath was performed. Will cont to aliya pt.

## 2017-02-10 NOTE — PLAN OF CARE
Problem: Occupational Therapy Goal  Goal: Occupational Therapy Goal  Goals to be met by: 2/24     Patient will increase functional independence with ADLs by performing:    Demo understanding for spinal safety with bed mobility.  UE Dressing in sitting with Set-up Assistance and Stand-by Assistance.  LE Dressing with Set-up Assistance, Minimal Assistance and Assistive Devices as needed.  Grooming while seated at sink with Stand-by Assistance.  Supine to sit with Contact Guard Assistance.  Stand pivot transfers with Minimal Assistance.  Sustained grasp on functional item (L hand) for 3 minute duration with SBA.  Outcome: Ongoing (interventions implemented as appropriate)  Rec HH at this time pending medical course. Pt will require 24 hour supervision/assist. ZORAIDA Villarreal 2/10/2017

## 2017-02-10 NOTE — PLAN OF CARE
Problem: Physical Therapy Goal  Goal: Physical Therapy Goal  Goals to be met by: 17     Patient will increase functional independence with mobility by performin. Supine to sit with Modified [place]  2. Sit to supine with Modified [place]  3. Sit to stand transfer with [place]  4. Bed to chair transfer with Supervision using Rolling Walker  5. Gait x 200 feet with Supervision using Rolling Walker.   6. Ascend/descend 5 stair with bilateral Handrails Supervision.   7. Lower extremity exercise program x 20 reps per handout, with independence.  Outcome: Ongoing (interventions implemented as appropriate)  PT goals established.

## 2017-02-10 NOTE — PROGRESS NOTES
Progress Note  Hematology/Oncology    Patient Name: Rah Puente  YOB: 1955    Admit Date: 2/9/2017                     LOS: 1    SUBJECTIVE:     Reason for Admission:  Upper extremity weakness    Rah Puente is a 61 y.o. male with PMH of Stage I colon CA, s/p colectomy in March 2016, HTN, DM2, and BPH who presented to clinic for neck pain/weakness and UE weakness. Recent CT imaging and bone scan concerning for mets and patient admitted to inpatient for further workup. Patient states he first noted worsening UE weakness 2 weeks prior 1/27/17 which began in his right shoulder and progressed down his arm to his hand. The following week he noted similar symptoms of the left arm. Patient also noted worsening neck weakness and pain as he was unable to lift his neck. Of note, patient with worsening lower extremity weakness which he states first started 1 month prior and had worsening gait, s/p fall. Patient also endorses UE and LE neuropathy.     Patient did not have oncologic f/u after colectomy for Stage I colon cancer and was recently referred by ortho as patient with worsening neck/back pain and scans concerning for metastatic disease.      Interval history:   Patient with decreased UOP, bladder scan showing 900 cc requiring straight cath. C/o of pain today mostly in neck and shoulders radiating to thoracic spine and up and down spin, burning sensation. Denies any worsening weakness. Denies headaches, vision changes, nausea, vomiting, constipation.     OBJECTIVE:     Vital Signs Range (Last 24H):  Temp:  [98.2 °F (36.8 °C)-98.5 °F (36.9 °C)]   Pulse:  [72-88]   Resp:  [18]   BP: (139-179)/(68-99)   SpO2:  [98 %] Body mass index is 21.09 kg/(m^2).    I & O (Last 24H):    Intake/Output Summary (Last 24 hours) at 02/10/17 0600  Last data filed at 02/10/17 0420   Gross per 24 hour   Intake              840 ml   Output             1000 ml   Net             -160 ml       Physical  Exam:  Constitutional: He is oriented to person, place, and time. No acute distress.   HEENT: Normocephalic, linear scab approximately 5 cm in length, no active discharge. Uvula is midline. Conjunctivae and EOM are normal. Pupils are equal, round, and reactive to light. Neck favoring right side looking down with difficulty lifting next and turning fully to the right.   Cardiovascular: Normal rate, regular rhythm, normal heart sounds and normal pulses.   No swelling noted to bilateral lower extremities.   Pulmonary/Chest: Effort normal and breath sounds normal. No respiratory distress. He has no wheezes. He has no rales.   Abdominal: Soft. Normal appearance and bowel sounds are normal. He exhibits no distension. There is no tenderness. There is no rebound and no guarding.   Neurological: He is alert and oriented to person, place, and time.   Noticeably decreased hand and motor strength of upper extremitie. No pain with forced movement of extremities above shoulders.   Bilateral UE: 2/5 lifting arms, 4/5 flexion and extension.   Bilateral LE: 4/5 strength hip flexion and knee flexion/extension  Finger to nose in tact.   Skin: Skin is warm, dry and intact. He is not diaphoretic. Erythematous maculopapular rash worse at dorsum of bilateral hands but tracing up the forearms bilaterally    Psychiatric: He has a normal mood and affect. His speech is normal and behavior is normal.     Medications:  Scheduled:   dexamethasone  8 mg Intravenous Q12H    fentaNYL  1 patch Transdermal Q72H    gabapentin  300 mg Oral TID    metoprolol succinate  200 mg Oral BID    tamsulosin  0.4 mg Oral Daily       Infusions:   sodium chloride 0.9% 100 mL/hr at 02/10/17 0103       PRN:  dextrose 50%, dextrose 50%, dextrose 50%, glucagon (human recombinant), glucagon (human recombinant), glucose, glucose, insulin aspart, ondansetron, oxycodone    Diagnostic Results:  Lab Results   Component Value Date    WBC 7.99 02/10/2017    HGB 10.6 (L)  "02/10/2017    HCT 30.5 (L) 02/10/2017    MCV 85 02/10/2017     02/10/2017       Recent Labs  Lab 02/10/17  0517   *   *   K 4.8   CL 81*   CO2 27   BUN 48*   CREATININE 3.1*   CALCIUM 9.0   MG 1.8     Lab Results   Component Value Date    INR 1.0 02/09/2017    INR 1.0 05/18/2014    INR 1.0 05/04/2012     Lab Results   Component Value Date    HGBA1C 5.8 03/13/2016     No results for input(s): POCTGLUCOSE in the last 72 hours.      ASSESSMENT/PLAN:     Active Hospital Problems    Diagnosis  POA    *Upper extremity weakness [R29.898]  Yes      Resolved Hospital Problems    Diagnosis Date Resolved POA   No resolved problems to display.       Mr. Puente is a 62 y/o man with hx of colon CA (originally Stage I T2, N0), HTN, DM2, and BPH who presented with worsening UE weakness and imaging concerning for metastasis. ECOG 3.         Stage IV Metastatic CA, suspect recurrent colon cancer   - patient with intussusception s/p colectomy on 3/10/16, path report showing invasive well-differentiated adenocarcinoma extending into muscularis propria, T2, N0  - patient then lost to follow up as he was told it was completely resected  - presented to ortho for worsening neck/back pain. CT 1/21/17 revealed "multiple lytic lesions in the lower cervical/upper thoracic spine, and multiple lung nodules." Referred to Dr. Goetz for concern for metastatic disease   - CEA markedly elevated at 415   - will obtain PSA  - MRI unable to use contrast 2/2 AGUILAR   - MRI brain showing ossesous calvarial mets but no acute intracranial structures   - f/u C-spine, T-spine, L-spine  - will consult NSGY for role of surgical intervention  - consult rad/onc   - retroperitoneal US consistent with chronic medical renal disease in addition to decreased perfusion of bilateral kidneys, distended bladder, and prostatomegaly; also showing likely liver mets   - decadron 8 mg bid   - oxy 10 mg q4h prn for pain and fentanyl patch   - gabapentin " 300 mg tid for neuropathic pain   - PT/OT     Essential HTN   - continue home metoprolol 200 mg bid  - will hold home quinapril 20 mg qHS in setting of AGUILAR      DM2  - on metformin at home   - will start on SSI   - gabapentin 300 mg tid for neuropathic pain      BPH  - continue Flomax 0.4 mg daily      AGUILAR  - Cr 1.7 on 2/3, baseline around 1   - Cr on presentation 3.4, down to 3.1 today s/p IVF    - urine studies showing postobstructive picture   - urine U/S showing prostatomegaly and distended bladder in addition to chronic medical renal disease   - also concern could be 2/2 metastatic disease   - bladder scans q2h with low threshold for nguyen placement   - NS @ 100   - will consult Urology      Hyponatremia  - likely hypovolemic hyponatremia  - will provide IVF   - will monitor      DVT ppx: TEDs, SCDs, holding Lovenox for possible surgical intervention   Diet: NPO for now in setting of possible surgery   Code: Full     Staff attestation to follow.      Carmen Yoder MD, PGY1      Attending Addendum:  The patient was seen, examined, and discussed on rounds with the fellow.  I agree with the assessment and plan as outlined for Rah Puente.  MRI findings noted.  Will ask neurosurgery to evaluate see if there is any role for surgical decompression.  Continue on steroids for now.  We'll also consult radiation oncology.  He wishes to hold off on urology evaluation.    Shawn Ruiz DO, FACP  Hematology & Oncology  [address] [place] [address]  ph. [phone]  Fax. [phone]

## 2017-02-10 NOTE — PLAN OF CARE
Problem: Patient Care Overview  Goal: Plan of Care Review  Outcome: Ongoing (interventions implemented as appropriate)  Afebrile. Free from falls or injury. Pt complained of head and spine pain. Given prn oxy.  NS infusing at 100ml/hr. Decadron given as scheduled. Accu checks q 6. Neuro checks q 4.  Bed locked in lowest position, non skid socks on, call light within reach. Pt instructed to call if any assistance is needed. Vitals stable. Will cont to aliya pt.

## 2017-02-10 NOTE — CONSULTS
ORTHOPEDIC SPINE SURGERY CONSULT    Attending Physician: Franklyn Ng M.D.    CHIEF COMPLAINT: weakness in arms/legs    HISTORY:  Rah Puente is a 61 y.o. male admitted for metastatic workup, h/o colon adenocarcinoma and with lung lesions.  He reports falling from a standing height in mid-December.  Since then he has had bilateral shoudler/arm pain.  He was seen by Delmy Correia and placed in aspen collar, which patient does not wear.  At that time he was walking without much difficulty.  In the past 2 weeks, patient's symptoms have worsened significantly . He can no longer even stand.  He has worsening weakness in his bilateral arms.  He has difficulty with buttons and keys.       Denies perineal paresthesias, bowel/bladder dysfunction.    He is s/p right hip hemiarthroplasty for fracture in July 2016 in [place].    PAST MEDICAL/SURGICAL HISTORY:  Past Medical History   Diagnosis Date    Alcohol abuse     BPH (benign prostatic hyperplasia)     Diabetes mellitus     Hypertension     Hyponatremia      Past Surgical History   Procedure Laterality Date    Hernia repair         Current Medications:   Current Facility-Administered Medications:     0.9%  NaCl infusion, , Intravenous, Continuous, Oli Martinez MD, Last Rate: 100 mL/hr at 02/10/17 1257    dexamethasone tablet 8 mg, 8 mg, Oral, Q12H, Oli Martinez MD    dextrose 50% injection 12.5 g, 12.5 g, Intravenous, PRN, Oli Martinez MD    dextrose 50% injection 12.5 g, 12.5 g, Intravenous, PRN, Carmen Yoder MD    dextrose 50% injection 25 g, 25 g, Intravenous, PRN, Oli Martinez MD    fentaNYL 50 mcg/hr 1 patch, 1 patch, Transdermal, Q72H, Carmen Yoder MD, 1 patch at 02/09/17 1715    gabapentin capsule 300 mg, 300 mg, Oral, TID, Carmen Yoder MD, 300 mg at 02/10/17 1306    glucagon (human recombinant) injection 1 mg, 1 mg, Intramuscular, PRN, Oli Martinez MD    glucagon (human recombinant) injection 1  mg, 1 mg, Intramuscular, PRN, Carmen Yoder MD    glucose chewable tablet 16 g, 16 g, Oral, PRN, Oli Martinez MD    glucose chewable tablet 24 g, 24 g, Oral, PRN, Oli Martinez MD    hydrALAZINE tablet 25 mg, 25 mg, Oral, Q8H PRN, Carmen Yoder MD, 25 mg at 02/10/17 1254    insulin aspart pen 0-5 Units, 0-5 Units, Subcutaneous, Q6H PRN, Carmen Yoder MD    metoprolol succinate (TOPROL-XL) 24 hr tablet 200 mg, 200 mg, Oral, BID, Carmen Yoder MD, 200 mg at 02/10/17 0809    ondansetron disintegrating tablet 8 mg, 8 mg, Oral, Q8H PRN, Oli Martinez MD    oxycodone immediate release tablet 10 mg, 10 mg, Oral, Q4H PRN, Oli Martinez MD, 10 mg at 02/10/17 1145    senna-docusate 8.6-50 mg per tablet 1 tablet, 1 tablet, Oral, Daily, Carmen Yoder MD, 1 tablet at 02/10/17 0945    tamsulosin 24 hr capsule 0.4 mg, 0.4 mg, Oral, BID, Carmen Yoder MD    Social History:   Social History     Social History    Marital status: Single     Spouse name: N/A    Number of children: N/A    Years of education: N/A     Occupational History    Not on file.     Social History Main Topics    Smoking status: Former Smoker     Packs/day: 1.00     Types: Cigarettes     Quit date: 12/7/2014    Smokeless tobacco: Not on file    Alcohol use 2.4 oz/week     4 Shots of liquor per week      Comment: vodka-nightly    Drug use: No    Sexual activity: Not on file     Other Topics Concern    Not on file     Social History Narrative       REVIEW OF SYSTEMS:  Constitution: Negative. Negative for chills, fever and night sweats.   Cardiovascular: Negative for chest pain and syncope.   Respiratory: Negative for cough and shortness of breath.   Gastrointestinal: See HPI. Negative for nausea/vomiting. Negative for abdominal pain.  Genitourinary: See HPI. Negative for discoloration or dysuria.  Skin: Negative for dry skin, itching and rash.   Hematologic/Lymphatic: neg  "for bleeding/clotting disorders.   Musculoskeletal: see HPI   Neurological: See HPI. neg history of seizures. neg history of cranial surgery or shunts.  Endocrine: Negative for polydipsia, polyphagia and polyuria.   Allergic/Immunologic: Negative for hives and persistent infections.  Psychiatric/Behavioral: Negative for depression and insomnia.         EXAM:  Visit Vitals    BP (!) 195/84 (BP Location: Right arm, Patient Position: Lying, BP Method: Automatic)    Pulse 68    Temp 97.3 °F (36.3 °C)    Resp 18    Ht 6' 2.02" (1.88 m)    Wt 74.5 kg (164 lb 3.9 oz)    SpO2 97%    BMI 21.08 kg/m2       General: The patient is a pleasant 61 y.o. male in no apparent distress, the patient is oriented to person, place and time.  Psych: Normal mood and affect  HEENT: Vision grossly intact, hearing intact to the spoken word.  Lungs: Respirations unlabored.  Gait: unable to stand.   Skin: Cervical skin negative for rashes, lesions, hairy patches and surgical scars.  Range of motion: Cervical range of motion is limited.  Fixed flexed position. There is + tenderness to palpation.    Spinal Balance: Global saggital and coronal spinal balance acceptable, no significant for scoliosis and kyphosis.  Musculoskeletal: tenderness in bilateral shoulders.  Interosseus wasting bilatearlly  Vascular: Bilateral hands warm and well perfused, Radial pulses 2+ bilaterally.  Neurological: decreased sensation bilat C5.  Triceps 3/5.  Biceps 3/5 strength.  bracioradialis 3/5. Wrist flexion/extension 4/5.  Finger abduction 4+/5.    Deep tendon reflexes symmetric 3+ in the bilateral upper and lower extremities.  + Barbour's bilaterally. Negative Babinski bilaterally.   No motor deficits in lower extremities.  Chronic bilateral dorsal and plantar foot numbness    IMAGING:   Xray and CT of cervical spine shows C5 compression/burs fracture with retropulsion into canal.  Multiple lesions  MRI c spine shows severe stenosis at C5 level with " myelomalacia and local kyphosis.  Mild stenosis at C4/5.  Multiple lesions  MRI thoracic spine shows T8 compression fracture with moderate canal stenosis.  MRI lumbar spine shows multiple lesions at L1, L2, l5.  L2 burst fracture causing mild stenosis    ASSESSMENT/PLAN:    Cervical myelopathy from C5 pathologic fracture.   Patient denies urinary incontinence but he did require straight cath for urinary retention.    Would need C5 corpectomy and fusion to treat this.  Will discuss this with patient.  Booked for Monday 2/13/17 tentatively.    Abhijit Burnett MD  Orthopedic Surgery PGY-4  [phone] pager

## 2017-02-10 NOTE — PHYSICIAN QUERY
PT Name: Rah Puente  MR #: [id number]     Physician Query Form - Documentation Clarification    Reviewer  Ext  Shital Cespedes RN - [email or web address]    This form is a permanent document in the medical record.     Query Date: February 10, 2017  By submitting this query, we are merely seeking further clarification of documentation to reflect the severity of illness of your patient. Please utilize your independent clinical judgment when addressing the question(s) below.    (The Medical record reflects the following:)      Supporting Clinical Findings Location in Medical Record   AGUILAR  - Cr 1.7 on 2/3, baseline around 1   - repeat CMP   - likely pre-renal as also hyponatremic   - will provide 1 L bolus and start on NS @ 100    H & P                                                                                      Doctor, Please specify diagnosis or diagnoses associated with above clinical findings.  Please further specify the meaning of AGUILAR.  [   ] Acute kidney injury  [   ] Acute kidney insufficiency  [   ] Other (specify) __________  [   ] Clinically undetermined      Physician Use Only                                                                                                                               [  +] Unable to determine

## 2017-02-10 NOTE — PROGRESS NOTES
Attempted to do an assessment with the patient, but he was off the floor. His primary oncology social worker, Monalisa Eng LCSW will follow as needed for assessment, discharge arrangements and psychosocial support on Monday.

## 2017-02-10 NOTE — CONSULTS
Inpatient consult to Radiation Oncology  Consult performed by: ROHIT MARTÍNEZ JR  Consult ordered by: BINH DONALD      Reason for consult: Metastatic carcinoma, Probable colon    HPI:  Mr. Puente's history dates back to March of 2016 when he presented to [facility] with symptoms secondary to an intussusception of the colon and terminal ileum.  The patient underwent Laparoscopic assisted Rt. colectomy with ileotransverse colostomy.  Within the pathologic specimen there was a large cecal villous adenoma which contained an invasive well differentiated adenocarcinoma.  There was extension into the muscularis propria.  12 of 12 nodes were negative for tumor involvement.  The patient was doing well until January of 2017 when he presented to Orthopedics for evaluation of a 6 week history of neck pain.  Initial plain films revealed compression fraction of C5 and fracture of the spinous process of C6.  Subsequent CT of the cervical spin revealed multiple lytic lesions in the lower cervical and upper thoracic spin with multiple lung nodules.  The patient was referred to medical oncology and further work up with bone scan on 2/7/17 revealed metastatic disease involving the spine, ribs, manubrium, clavicle and Rt. humerus.  CT of the abdomen and pelvis revealed extensive metastatic disease involving the lungs, skeleton, abdominal/retroperitoneal lymph nodes, and left adrenal gland.  There was a lytic lesion at S2.   The patient returned to clinic yesterday with a one week history of bilateral upper extremity weakness.  He was admitted for work up and treatment.      Review of Systems   Constitutional: Positive for appetite change. Negative for activity change, fatigue, fever and unexpected weight change.   HENT: Negative for mouth sores, nosebleeds and trouble swallowing.   Eyes: Negative for discharge and visual disturbance.   Respiratory: Negative for cough, shortness of breath and wheezing.    Cardiovascular: Negative for chest pain and leg swelling.   Gastrointestinal: Positive for abdominal pain. Negative for abdominal distention, blood in stool, constipation, diarrhea, nausea and vomiting.   Genitourinary: Negative for decreased urine volume, difficulty urinating, frequency and hematuria.   Musculoskeletal: Positive for back pain, extremity weakness, gait problem, neck pain and neck stiffness.   Skin: Negative for color change and rash.   Neurological: Positive for weakness and numbness. Negative for headaches.   Hematological: Negative for adenopathy.   Psychiatric/Behavioral: Negative for sleep disturbance. The patient is not nervous/anxious.   All other systems reviewed and are negative.      Allergies:  Review of patient's allergies indicates:  No Known Allergies      PMH:       Past Medical History   Diagnosis Date    Alcohol abuse      BPH (benign prostatic hyperplasia)      Diabetes mellitus      Hypertension      Hyponatremia           PSH:        Past Surgical History   Procedure Laterality Date    Hernia repair             FamHx:  No family history on file.     SocHx:   Social History    Social History            Social History    Marital status: Single       Spouse name: N/A    Number of children: N/A    Years of education: N/A          Occupational History    Not on file.              Social History Main Topics    Smoking status: Former Smoker       Packs/day: 1.00       Types: Cigarettes       Quit date: 12/7/2014    Smokeless tobacco: Not on file    Alcohol use 2.4 oz/week        4 Shots of liquor per week          Comment: vodka-nightly    Drug use: No    Sexual activity: Not on file           Other Topics Concern    Not on file      Social History Narrative         Objective:      Physical Exam   Constitutional: He is oriented to person, place, and time. He appears well-developed and well-nourished.     Noticeably decreased hand and motor strength of upper extremities.  No pain with forced movement of extremities above shoulders.   Psychiatric: He has a normal mood and affect. His speech is normal and behavior is normal.         Radiology;    I personally reviewed the images of the MRI of the spine which reveled extensive osseous metastatic disease involving the visualized cervical, thoracic and lumbar spine with innumerable T1 hypointense/T2 hyperintense osseous metastatic lesions identified on today's exam.    There is complete T1 marrow replacement of the C5 vertebral with body with associated pathologic compression fracture.  The overall degree of vertebral body height loss appears greater than that on the most recent CT examination slight increase personally hyperintense to be within the superior endplate of the vertebral body suggestive of possible acute on chronic compression fracture.  There is associated focal kyphosis epicentered at the C4 and C5 level with significant retropulsion of the posterior aspect of the C5 vertebral body resulting in severe spinal canal stenosis at the C4-C5 level with the spinal cord measuring only approximately 4 mm in AP diameter at this level.  There is slight increased soft tissue density within the epidural space concerning for possible component of epidural spread. There is also associated severe bilateral neural foraminal narrowing. No definite abnormal cord signal in this level.  There is also severe spinal canal stenosis at the C5-T6 level and severe bilateral neural foraminal narrowing.    There is a pathologic compression fracture deformity involving the T8 vertebral body with mild posterior retropulsion of the posterior aspect of the T8 vertebral body.  There is slight exaggerated thoracic kyphosis.  There is subsequent effacement of the left anterior thecal sac at this level.  There is increased prominence of the anterior epidural space, concerning for possible component of epidural spread.  There is subsequent bilateral moderate  neural foraminal narrowing at the T8-T9 level.    There is extensive osseous metastatic disease involving the lumbar spine, sacrum and pelvis.  There is slight posterior bowing of the L2 vertebral body with associated soft tissue component concerning for epidural spread resulting in effacement of the right lateral recess at this level.      There is a large T1 hypointense lesion involving near entirety of the S1 and S2 vertebral bodies with large epidural soft tissue component resulting in significant effacement of the right lateral recess at the S1-S2 level with mass effect on the exiting right S1 nerve root and descending right S2 nerve root.         Assessment:       History of Stage I (T2, N0, M0) adenocarcinoma of the colon resected in March of 2016, now with extensive metastatic disease.        Plan:       I had a long talk with the patient.  Explained that although colon cancer is a possibility, this is certainly an unusual history given his initial disease and the time course.  We discussed the role of radiotherapy in this setting.  Explained radiotherapy would be used to help relieve his pain and stop further progression of disease in certain areas.  I would recommend palliative radiotherapy to the C4 - C6 region, T8 region and sacral mass.  Discussed the procedures, risks and benefits of therapy.  Await input from Dr. Ng given the patient has developed upper extremity weakness, he may likely benefit from laminectomy in the cervical region.  If surgery is planned will start radiotherapy following his surgery.

## 2017-02-10 NOTE — PT/OT/SLP EVAL
Physical Therapy  Evaluation    Rah Puente   MRN: [id number]   Admitting Diagnosis: Upper extremity weakness    PT Received On: 02/10/17  PT Start Time: 1144     PT Stop Time: 1218    PT Total Time (min): 34 min       Billable Minutes:  Evaluation 18 and Therapeutic Activity 16    Diagnosis: Upper extremity weakness      Past Medical History   Diagnosis Date    Alcohol abuse     BPH (benign prostatic hyperplasia)     Diabetes mellitus     Hypertension     Hyponatremia       Past Surgical History   Procedure Laterality Date    Hernia repair         Referring physician: Oli Martinez MD  Date referred to PT: 17    General Precautions: Standard, NPO, fall  Orthopedic Precautions: N/A   Braces: Aspen collar (Pt owns an aspen collar, does not don sec to reported changes in neck posture with collar.  Pt reported minor pain relief when donned. )       Do you have any cultural, spiritual, Scientologist conflicts, given your current situation?: none    Patient History:  Lives With: alone  Living Arrangements: house  Home Accessibility: stairs to enter home  Home Layout: Able to live on 1st floor  Number of Stairs to Enter Home: 5  Stair Railings at Home: outside, present at both sides  Transportation Available: car  Equipment Currently Used at Home: bedside commode, bath bench, walker, rolling  DME owned (not currently used): rolling walker, bedside commode and transfer tub bench    Previous Level of Function:  Ambulation Skills: independent (Prior to 2 months, before most recent falls)  Transfer Skills: independent  ADL Skills: independent  Work/Leisure Activity: independent    Subjective:  Communicated with nursing prior to session.    Chief Complaint: Pain in neck and shouldera  Patient goals: To reduce pain to 3-4/10 VAS, and to return to work 8hrs/day    Pain Ratin/10   Location - Side: Bilateral     Location: neck (And B shoulders, denies N/T)  Pain Addressed: Reposition, Distraction, Pre-medicate for  activity       Objective:   Patient found with: peripheral IV     Cognitive Exam:  Oriented to: Person, Place, Time and Situation    Follows Commands/attention: Follows multistep  commands  Communication: clear/fluent  Safety awareness/insight to disability: intact    Physical Exam:  Postural examination/scapula alignment: Head forward, Affected scapula depressed, Posterior pelvic tilt, Abnormal trunk flexion and Kyphosis    Skin integrity: Contusion of the L forehead, Visible bruising throughout face, R cheek  Edema: None noted     Sensation:   Impaired  light/touch R forearm, B feet    Upper Extremity Range of Motion:  Right Upper Extremity: Impaired sh AROM/PROM  Left Upper Extremity: Impaired sh AROM/PROM    Upper Extremity Strength:  Right Upper Extremity: Impaired throughout  Left Upper Extremity: Impaired throughout    Lower Extremity Range of Motion:  Right Lower Extremity: WFL  Left Lower Extremity: WFL    Lower Extremity Strength:  Right Lower Extremity: WFL except R hip flex/ext 3+/5  Left Lower Extremity: WFL except L hip ext 3+/5     Fine motor coordination:  Intact  RLE heel shin and LLE heel shin    Gross motor coordination: WFL    Functional Mobility:  Bed Mobility:  Rolling/Turning Right:  (Unable to roll sec to pain)  Scooting/Bridging: Modified Independent  Supine to Sit: Moderate Assistance (To facilitate lowering 1LE and to elevate trunk from supine)    Transfers:  Sit <> Stand Assistance: Minimum Assistance (Perf 2xs from bed, with r/o shooting pain in B UEs, 2nd trial pt perf without pushing off UEs, r/o reduced pain in 2nd t/f)  Sit <> Stand Assistive Device: Rolling Walker  Bed <> Chair Technique: Stand Pivot  Bed <> Chair Assistance: Minimum Assistance  Bed <> Chair Assistive Device: Rolling Walker    Gait:   Gait Distance: Pt amb 3'   Assistance 1: Minimum assistance  Gait Assistive Device: Rolling walker  Gait Pattern: reciprocal  Gait Deviation(s): decreased davion, increased time in  double stance, decreased velocity of limb motion, decreased step length, decreased stride length, decreased weight-shifting ability      Balance:   Static Sit: FAIR: Maintains without assist, but unable to take any challenges   Dynamic Sit: FAIR: Cannot move trunk without losing balance  Static Stand: POOR+: Needs MINIMAL assist to maintain  Dynamic stand: POOR: N/A    Therapeutic Activities and Exercises:  Ed pt on hand placement for standing transfers to reduce symptomatic pain.  Discussed postural control and elevation of head, pt unable to perf at this time.     AM-PAC 6 CLICK MOBILITY  How much help from another person does this patient currently need?   1 = Unable, Total/Dependent Assistance  2 = A lot, Maximum/Moderate Assistance  3 = A little, Minimum/Contact Guard/Supervision  4 = None, Modified [place]/Independent    Turning over in bed (including adjusting bedclothes, sheets and blankets)?: 2  Sitting down on and standing up from a chair with arms (e.g., wheelchair, bedside commode, etc.): 3  Moving from lying on back to sitting on the side of the bed?: 2  Moving to and from a bed to a chair (including a wheelchair)?: 3  Need to walk in hospital room?: 3  Climbing 3-5 steps with a railing?: 2  Total Score: 15     AM-PAC Raw Score CMS G-Code Modifier Level of Impairment Assistance   6 % Total / Unable   7 - 9 CM 80 - 100% Maximal Assist   10 - 14 CL 60 - 80% Moderate Assist   15 - 19 CK 40 - 60% Moderate Assist   20 - 22 CJ 20 - 40% Minimal Assist   23 CI 1-20% SBA / CGA   24 CH 0% Independent/ Mod I     Patient left up in chair with all lines intact and call button in reach.    Assessment:   Rah Puente is a 61 y.o. male with a medical diagnosis of Upper extremity weakness and presents with significant impairments in posture and mm strength.  Pt has a history of falls, most recently pt has had two falls within the past two months.  Both of which are injurious falls.  No fx of the  spine were noted, however pt is currently unable to elevate his head or demo active use of R UE past 20deg R sh flex.  Impairments in gait are due to pain in B shoulders with gait.  Pt will require skilled PT services to address the above impairments.        Rehab identified problem list/impairments: Rehab identified problem list/impairments: weakness, impaired endurance, impaired self care skills, impaired functional mobilty, impaired balance, gait instability, decreased lower extremity function, pain, orthopedic precautions, impaired joint extensibility, decreased ROM, visual deficits (Visual deficits sec to continuous forward head posture and neck flex, unable to look upright)    Rehab potential is fair.    Activity tolerance: Fair    Discharge recommendations: Discharge Facility/Level Of Care Needs: home health PT     Barriers to discharge: Barriers to Discharge: Decreased caregiver support, Inaccessible home environment    Equipment recommendations: Equipment Needed After Discharge: wheelchair     GOALS:   Physical Therapy Goals        Problem: Physical Therapy Goal    Goal Priority Disciplines Outcome Goal Variances Interventions   Physical Therapy Goal     PT/OT, PT Ongoing (interventions implemented as appropriate)     Description:  Goals to be met by: 17     Patient will increase functional independence with mobility by performin. Supine to sit with Modified [place]  2. Sit to supine with Modified [place]  3. Sit to stand transfer with [place]  4. Bed to chair transfer with Supervision using Rolling Walker  5. Gait  x 200 feet with Supervision using Rolling Walker.   6. Ascend/descend 5 stair with bilateral Handrails Supervision.   7. Lower extremity exercise program x 20 reps per handout, with independence.                PLAN:    Patient to be seen 4 x/week to address the above listed problems via gait training, therapeutic activities, therapeutic exercises, neuromuscular  re-education, wheelchair management/training  Plan of Care expires: 03/10/17  Plan of Care reviewed with: patient          Lucia Dwight, PT  02/10/2017

## 2017-02-10 NOTE — ANESTHESIA PREPROCEDURE EVALUATION
02/10/2017  Rah Puente is a 61 y.o., male.    Pre-operative evaluation for Procedure(s) (LRB):  CORPECTOMY - C5.  (N/A)    Rah Puente is a 61 y.o. male with PMH of Stage I colon CA with lung lesions s/p colectomy in March 2016, HTN (uncontrolled), DM2 (A1c: 5.8), and BPH who presented to clinic for neck pain/weakness and UE weakness.    BP elevated. Oxygenating well on RA. Hyponatremia.    LDA:   22g L wrist     Prev airway: Present Prior to Hospital Arrival?: No; Method of Intubation: Direct laryngoscopy; Inserted by: CRNA; Airway Device: Endotracheal Tube; Mask Ventilation: Easy; Intubated: Postinduction; Blade: Cummins #2; Airway Device Size: 7.0; Style: Cuffed; Cuff Inflation: Minimal occlusive pressure; Inflation Amount: 3; Placement Verified By: Auscultation, Capnometry; Grade: Grade I; Complicating Factors: Overbite, Anterior larynx, Long mandible; Intubation Findings: Positive EtCO2, Bilateral breath sounds, Atraumatic/Condition of teeth unchanged;  Depth of Insertion: 22; Securment: Teeth; Complications: None;    Drips:   NS @ 100ml/h    Patient Active Problem List   Diagnosis    Cellulitis and abscess of leg    Diabetes type 2, controlled    Hyponatremia    Alcohol abuse    Hematoma    Nail entering through skin    Hypertension    Cellulitis of foot, left    Diabetic neuropathy    Trauma    Intussusception of colon    Villous adenoma of right colon    History of colon cancer, stage I    Upper extremity weakness    Metastatic cancer to bone       Review of patient's allergies indicates:  No Known Allergies     No current facility-administered medications on file prior to encounter.      Current Outpatient Prescriptions on File Prior to Encounter   Medication Sig Dispense Refill    fentaNYL (DURAGESIC) 50 mcg/hr Place 1 patch onto the skin every 72 hours. 5 patch 0     gabapentin (NEURONTIN) 300 MG capsule Take 300 mg by mouth 2 (two) times daily.      hydrocodone-acetaminophen 10-325mg (NORCO)  mg Tab Take 1 tablet by mouth every 4 (four) hours as needed for Pain. 90 tablet 0    metformin (GLUCOPHAGE) 500 MG tablet Take 500 mg by mouth 2 (two) times daily with meals.      metoprolol succinate (TOPROL-XL) 200 MG 24 hr tablet Take 200 mg by mouth 2 (two) times daily.      quinapril (ACCUPRIL) 20 MG tablet Take 20 mg by mouth every evening.      tamsulosin (FLOMAX) 0.4 mg Cp24 Take 0.4 mg by mouth once daily.         Past Surgical History   Procedure Laterality Date    Hernia repair         Social History     Social History    Marital status: Single     Spouse name: N/A    Number of children: N/A    Years of education: N/A     Occupational History    Not on file.     Social History Main Topics    Smoking status: Former Smoker     Packs/day: 1.00     Types: Cigarettes     Quit date: 12/7/2014    Smokeless tobacco: Not on file    Alcohol use 2.4 oz/week     4 Shots of liquor per week      Comment: vodka-nightly    Drug use: No    Sexual activity: Not on file     Other Topics Concern    Not on file     Social History Narrative         Vital Signs Range (Last 24H):  Temp:  [36.3 °C (97.3 °F)-36.9 °C (98.5 °F)]   Pulse:  [68-88]   Resp:  [18]   BP: (139-195)/(68-99)   SpO2:  [97 %-99 %]       CBC:   Recent Labs      02/09/17   1944  02/10/17   0517   WBC  9.11  7.99   RBC  3.89*  3.61*   HGB  11.2*  10.6*   HCT  33.4*  30.5*   PLT  233  202   MCV  86  85   MCH  28.8  29.4   MCHC  33.5  34.8       CMP:   Recent Labs      02/09/17   1945  02/10/17   0517   NA  122*  122*   K  4.7  4.8   CL  79*  81*   CO2  28  27   BUN  49*  48*   CREATININE  3.4*  3.1*   GLU  109  140*   MG  1.9  1.8   PHOS  3.8  3.9   CALCIUM  9.3  9.0   ALBUMIN  3.2*  2.8*   PROT  7.8  7.0   ALKPHOS  336*  295*   ALT  17  13   AST  52*  46*   BILITOT  0.7  0.6       INR  Recent Labs       "02/09/17 1945   INR  1.0   APTT  28.8           Diagnostic Studies:      EKG:  Sinus rhythm with occasional Premature ventricular complexes  Possible Left atrial enlargement  Left axis deviation  Abnormal ECG  When compared with ECG of 04-MAY-2012 09:40,  Premature ventricular complexes are now Present    [facility] Anesthesia Evaluation    I have reviewed the Patient Summary Reports.    I have reviewed the Nursing Notes.   I have reviewed the Medications.     Review of Systems  Anesthesia Hx:  No problems with previous Anesthesia  Denies Family Hx of Anesthesia complications.  Personal Hx of Anesthesia complications Slow To Awaken/Delayed Emergence (pt not sure of details)   Social:  Smoker, Alcohol Use 2 ppd for many years  4 vodkas per night   Hematology/Oncology:  Hematology Normal   Oncology Normal     EENT/Dental:EENT/Dental Normal   Cardiovascular:   Exercise tolerance: good Hypertension, well controlled    Pulmonary:  Pulmonary Normal Prescribed an inhaler years ago, not used recently   Renal/:   BPH    Hepatic/GI:  Hepatic/GI Normal    Musculoskeletal:  Musculoskeletal Normal    Neurological:   Peripheral Neuropathy (diabetic)    Endocrine:   Diabetes, well controlled, type 2 hgb A1c   Dermatological:  Skin Normal    Psych:  Psychiatric Normal           Physical Exam  General:  Well nourished      Dental:  Dental Findings: In tact    Chest/Lungs:  Chest/Lungs Findings: (has a "rapid intake of air" at the beginning of sentences, pauses frequently, denies SOB) Clear to auscultation              Anesthesia Plan  Type of Anesthesia, risks & benefits discussed:  Anesthesia Type:  general  Patient's Preference:   Intra-op Monitoring Plan:   Intra-op Monitoring Plan Comments:   Post Op Pain Control Plan:   Post Op Pain Control Plan Comments:   Induction:   IV  Beta Blocker:  Patient is on a Beta-Blocker and has received one dose within the past 24 hours (No further documentation required).       Informed Consent: " Patient understands risks and agrees with Anesthesia plan.  Questions answered. Anesthesia consent signed with patient.  ASA Score: 3     Day of Surgery Review of History & Physical:    H&P update referred to the surgeon.         Ready For Surgery From Anesthesia Perspective.

## 2017-02-11 LAB
ALBUMIN SERPL BCP-MCNC: 2.7 G/DL
ALP SERPL-CCNC: 268 U/L
ALT SERPL W/O P-5'-P-CCNC: 12 U/L
ANION GAP SERPL CALC-SCNC: 10 MMOL/L
AST SERPL-CCNC: 32 U/L
BASOPHILS # BLD AUTO: 0 K/UL
BASOPHILS NFR BLD: 0 %
BILIRUB SERPL-MCNC: 0.5 MG/DL
BUN SERPL-MCNC: 43 MG/DL
CALCIUM SERPL-MCNC: 8.9 MG/DL
CHLORIDE SERPL-SCNC: 85 MMOL/L
CO2 SERPL-SCNC: 30 MMOL/L
CREAT SERPL-MCNC: 2.3 MG/DL
DIFFERENTIAL METHOD: ABNORMAL
EOSINOPHIL # BLD AUTO: 0 K/UL
EOSINOPHIL NFR BLD: 0 %
ERYTHROCYTE [DISTWIDTH] IN BLOOD BY AUTOMATED COUNT: 13.4 %
EST. GFR  (AFRICAN AMERICAN): 33.7 ML/MIN/1.73 M^2
EST. GFR  (NON AFRICAN AMERICAN): 29.1 ML/MIN/1.73 M^2
GLUCOSE SERPL-MCNC: 210 MG/DL
HCT VFR BLD AUTO: 30.3 %
HGB BLD-MCNC: 10.5 G/DL
LYMPHOCYTES # BLD AUTO: 0.6 K/UL
LYMPHOCYTES NFR BLD: 7.6 %
MAGNESIUM SERPL-MCNC: 1.5 MG/DL
MCH RBC QN AUTO: 29.1 PG
MCHC RBC AUTO-ENTMCNC: 34.7 %
MCV RBC AUTO: 84 FL
MONOCYTES # BLD AUTO: 0.5 K/UL
MONOCYTES NFR BLD: 6 %
NEUTROPHILS # BLD AUTO: 6.4 K/UL
NEUTROPHILS NFR BLD: 84.9 %
PHOSPHATE SERPL-MCNC: 3.1 MG/DL
PLATELET # BLD AUTO: 203 K/UL
PMV BLD AUTO: 9.9 FL
POCT GLUCOSE: 230 MG/DL (ref 70–110)
POCT GLUCOSE: 257 MG/DL (ref 70–110)
POCT GLUCOSE: 289 MG/DL (ref 70–110)
POTASSIUM SERPL-SCNC: 4.7 MMOL/L
PROT SERPL-MCNC: 6.7 G/DL
RBC # BLD AUTO: 3.61 M/UL
SODIUM SERPL-SCNC: 125 MMOL/L
WBC # BLD AUTO: 7.5 K/UL

## 2017-02-11 PROCEDURE — 99233 SBSQ HOSP IP/OBS HIGH 50: CPT | Mod: ,,, | Performed by: INTERNAL MEDICINE

## 2017-02-11 PROCEDURE — 99232 SBSQ HOSP IP/OBS MODERATE 35: CPT | Mod: ,,, | Performed by: INTERNAL MEDICINE

## 2017-02-11 PROCEDURE — 84100 ASSAY OF PHOSPHORUS: CPT

## 2017-02-11 PROCEDURE — 80053 COMPREHEN METABOLIC PANEL: CPT

## 2017-02-11 PROCEDURE — 20600001 HC STEP DOWN PRIVATE ROOM

## 2017-02-11 PROCEDURE — 25000003 PHARM REV CODE 250: Performed by: STUDENT IN AN ORGANIZED HEALTH CARE EDUCATION/TRAINING PROGRAM

## 2017-02-11 PROCEDURE — 83735 ASSAY OF MAGNESIUM: CPT

## 2017-02-11 PROCEDURE — 36415 COLL VENOUS BLD VENIPUNCTURE: CPT

## 2017-02-11 PROCEDURE — 63600175 PHARM REV CODE 636 W HCPCS: Performed by: STUDENT IN AN ORGANIZED HEALTH CARE EDUCATION/TRAINING PROGRAM

## 2017-02-11 PROCEDURE — 85025 COMPLETE CBC W/AUTO DIFF WBC: CPT

## 2017-02-11 RX ORDER — MAGNESIUM SULFATE HEPTAHYDRATE 40 MG/ML
2 INJECTION, SOLUTION INTRAVENOUS ONCE
Status: COMPLETED | OUTPATIENT
Start: 2017-02-11 | End: 2017-02-11

## 2017-02-11 RX ORDER — CARVEDILOL 25 MG/1
25 TABLET ORAL 2 TIMES DAILY
Status: DISCONTINUED | OUTPATIENT
Start: 2017-02-11 | End: 2017-02-17 | Stop reason: HOSPADM

## 2017-02-11 RX ORDER — HEPARIN SODIUM 5000 [USP'U]/ML
5000 INJECTION, SOLUTION INTRAVENOUS; SUBCUTANEOUS EVERY 8 HOURS
Status: COMPLETED | OUTPATIENT
Start: 2017-02-11 | End: 2017-02-12

## 2017-02-11 RX ORDER — FENTANYL 75 UG/H
1 PATCH TRANSDERMAL
Status: DISCONTINUED | OUTPATIENT
Start: 2017-02-11 | End: 2017-02-17 | Stop reason: HOSPADM

## 2017-02-11 RX ADMIN — HEPARIN SODIUM 5000 UNITS: 5000 INJECTION, SOLUTION INTRAVENOUS; SUBCUTANEOUS at 02:02

## 2017-02-11 RX ADMIN — OXYCODONE HYDROCHLORIDE 10 MG: 5 TABLET ORAL at 10:02

## 2017-02-11 RX ADMIN — CARVEDILOL 25 MG: 25 TABLET, FILM COATED ORAL at 09:02

## 2017-02-11 RX ADMIN — OXYCODONE HYDROCHLORIDE 10 MG: 5 TABLET ORAL at 04:02

## 2017-02-11 RX ADMIN — GABAPENTIN 300 MG: 300 CAPSULE ORAL at 05:02

## 2017-02-11 RX ADMIN — OXYCODONE HYDROCHLORIDE 10 MG: 5 TABLET ORAL at 09:02

## 2017-02-11 RX ADMIN — TAMSULOSIN HYDROCHLORIDE 0.4 MG: 0.4 CAPSULE ORAL at 09:02

## 2017-02-11 RX ADMIN — SODIUM CHLORIDE: 0.9 INJECTION, SOLUTION INTRAVENOUS at 05:02

## 2017-02-11 RX ADMIN — HYDRALAZINE HYDROCHLORIDE 25 MG: 25 TABLET ORAL at 04:02

## 2017-02-11 RX ADMIN — HEPARIN SODIUM 5000 UNITS: 5000 INJECTION, SOLUTION INTRAVENOUS; SUBCUTANEOUS at 10:02

## 2017-02-11 RX ADMIN — DEXAMETHASONE 8 MG: 4 TABLET ORAL at 09:02

## 2017-02-11 RX ADMIN — HEPARIN SODIUM 5000 UNITS: 5000 INJECTION, SOLUTION INTRAVENOUS; SUBCUTANEOUS at 07:02

## 2017-02-11 RX ADMIN — INSULIN ASPART 3 UNITS: 100 INJECTION, SOLUTION INTRAVENOUS; SUBCUTANEOUS at 12:02

## 2017-02-11 RX ADMIN — MAGNESIUM SULFATE IN WATER 2 G: 40 INJECTION, SOLUTION INTRAVENOUS at 08:02

## 2017-02-11 RX ADMIN — INSULIN ASPART 2 UNITS: 100 INJECTION, SOLUTION INTRAVENOUS; SUBCUTANEOUS at 06:02

## 2017-02-11 RX ADMIN — METOPROLOL SUCCINATE 200 MG: 50 TABLET, EXTENDED RELEASE ORAL at 09:02

## 2017-02-11 RX ADMIN — GABAPENTIN 300 MG: 300 CAPSULE ORAL at 02:02

## 2017-02-11 RX ADMIN — GABAPENTIN 300 MG: 300 CAPSULE ORAL at 09:02

## 2017-02-11 RX ADMIN — STANDARDIZED SENNA CONCENTRATE AND DOCUSATE SODIUM 1 TABLET: 8.6; 5 TABLET, FILM COATED ORAL at 09:02

## 2017-02-11 RX ADMIN — INSULIN ASPART 3 UNITS: 100 INJECTION, SOLUTION INTRAVENOUS; SUBCUTANEOUS at 05:02

## 2017-02-11 RX ADMIN — FENTANYL TRANSDERMAL 1 PATCH: 75 PATCH, EXTENDED RELEASE TRANSDERMAL at 09:02

## 2017-02-11 NOTE — PLAN OF CARE
Problem: Patient Care Overview  Goal: Plan of Care Review  Outcome: Ongoing (interventions implemented as appropriate)  Consult to neurosg, pt will have a corpectomy on Monday 2/13. Radiation oncology consult placed, pain controlled by current regimen.  Side rails up x2, call bell in place, bed in lowest, locked position, skid proof socks on, no evidence of skin breakdown, care plan explained to patient, no additional complaints at this time.

## 2017-02-11 NOTE — PROGRESS NOTES
Dr Johnson notified of pt's bp of 200/94. Prn Hydralazine given at 0450. Ordered a one time dose of Labetalol 5mg. Will cont to laiya randolph.

## 2017-02-11 NOTE — CONSULTS
History & Physical-Preoperative Risk Assessment  Hospital Medicine-Consult Service       Admission Date: 2/9/2017  Referring physician: Shawn Ruiz DO, F*     Reason for consult:   Preoperative cardiovascular risk assessment for C5 corpectomy   Antihypertensive reccomendations    HPI:   Patient information was obtained from patient. Primary care Physician: Brooks Gilbert MD    61 y.o.male w/ PMH of Stage I colon CA, s/p colectomy in March 2016, HTN, DM2, and BPH who presented to clinic for neck pain/weakness and UE weakness. Recent CT imaging and bone scan concerning for mets and patient admitted to inpatient for further workup. Found to have metastatic disease, now scheduled for C5 corpectomy 2/13. Cardiology consulted for pre-op risk stratification and antihypertensive recommendations.     Review of Systems:   Constitutional: no fever or chills  Eyes: no visual changes  ENT: no nasal congestion or sore throat  Respiratory: no cough or shortness of breath  Cardiovascular: no chest pain or palpitations  Gastrointestinal: no nausea or vomiting, no abdominal pain or change in bowel habits  Genitourinary: no hematuria or dysuria  Integument/Breast: no rash or pruritis  Hematologic/Lymphatic: no easy bruising or lymphadenopathy  Musculoskeletal: no  Myalgias, +arthralgias  neurological: no seizures or tremors  Behavioral/Psych: no auditory or visual hallucinations  Endocrine: no heat or cold intolerance  Past Medical and Surgical History:     Past Medical History   Diagnosis Date    Alcohol abuse     BPH (benign prostatic hyperplasia)     Diabetes mellitus     Hypertension     Hyponatremia      Past Surgical History   Procedure Laterality Date    Hernia repair        Home Medications:     Prior to Admission medications    Medication Sig Start Date End Date Taking? Authorizing Provider   fentaNYL (DURAGESIC) 50 mcg/hr Place 1 patch onto the skin every 72 hours. 1/30/17 1/30/18  Veronica Keith NP  "  gabapentin (NEURONTIN) 300 MG capsule Take 300 mg by mouth 2 (two) times daily.    Historical Provider, MD   hydrocodone-acetaminophen 10-325mg (NORCO)  mg Tab Take 1 tablet by mouth every 4 (four) hours as needed for Pain. 2/7/17   Veronica Keith NP   metformin (GLUCOPHAGE) 500 MG tablet Take 500 mg by mouth 2 (two) times daily with meals.    Historical Provider, MD   metoprolol succinate (TOPROL-XL) 200 MG 24 hr tablet Take 200 mg by mouth 2 (two) times daily.    Historical Provider, MD   quinapril (ACCUPRIL) 20 MG tablet Take 20 mg by mouth every evening.    Historical Provider, MD   tamsulosin (FLOMAX) 0.4 mg Cp24 Take 0.4 mg by mouth once daily.    Historical Provider, MD     Allergies:     Review of patient's allergies indicates:  No Known Allergies     Social and Family History:     Social History     Social History    Marital status: Single     Spouse name: N/A    Number of children: N/A    Years of education: N/A     Social History Main Topics    Smoking status: Former Smoker     Packs/day: 1.00     Types: Cigarettes     Quit date: 12/7/2014    Smokeless tobacco: None    Alcohol use 2.4 oz/week     4 Shots of liquor per week      Comment: vodka-nightly    Drug use: No    Sexual activity: Not Asked     Other Topics Concern    None     Social History Narrative     History reviewed. No pertinent family history.  Physical Exam:     Visit Vitals    BP (!) 184/84 (BP Location: Right arm, Patient Position: Lying, BP Method: Automatic)    Pulse 64    Temp 97.6 °F (36.4 °C) (Oral)    Resp 17    Ht 6' 2.02" (1.88 m)    Wt 76.3 kg (168 lb 3.4 oz)    SpO2 98%    BMI 21.59 kg/m2   Body mass index is 21.59 kg/(m^2).   General appearance: well developed, no distress  Head: NCAT w/o lesions or tenderness  Eyes:  conjunctivae/corneas clear. PERRL.  Throat: lips, mucosa, and tongue normal; teeth and gums normal and no throat erythema  Neck: supple, symmetrical, trachea midline, no JVD and thyroid " not enlarged, symmetric, no tenderness/mass/nodules  Lungs: unlabored respirations, no intercostal retractions or accessory muscle use, clear to auscultation without rales or wheezes  Heart: regular rate and rhythm, S1, S2 normal, + 3/6 systolic murmur radiating towards the carotids   Abdomen: soft, non-tender non-distented; bowel sounds normal; no masses,  no organomegaly  Rectal: deferred  Extremities: no cyanosis or edema, or clubbing  Skin: Skin color, texture, turgor normal. No rashes or lesions  Lymph: Cervical, supraclavicular, and axillary nodes normal.  Psych: pleasant and appropriate,Oriented x3    Labs and Diagnostic Results:     Recent Labs      02/11/17   0609   WBC  7.50   RBC  3.61*   HGB  10.5*   HCT  30.3*   PLT  203   MCV  84   MCH  29.1   MCHC  34.7      Recent Labs      02/11/17   0609   GLU  210*   NA  125*   K  4.7   CL  85*   CO2  30*   BUN  43*   CREATININE  2.3*   ANIONGAP  10   CALCIUM  8.9   BILITOT  0.5   AST  32   ALT  12   ALKPHOS  268*   ALBUMIN  2.7*   PROT  6.7   MG  1.5*   PHOS  3.1       Recent Labs      02/09/17   1945   INR  1.0   APTT  28.8       EKG: I have personally reviewed and shows normal sinus rhythm, no blocks or conduction defects, no ischemic changes  No results found for: EF     Surgical Risk Assessment   Active cardiac issues:  Active decompensated heart failure? No   Unstable angina?  No   Significant uncontrolled arrhythmias? No   Severe valvular heart disease-Aortic or Mitral Stenosis? No   Recent MI or coronary revascularization < 30 days? No     Cardiac Risk Factors  History of CAD/ischemic heart disease? No   History of cerebrovascular disease? No   History of compensated heart failure? No   Type 2 diabetes requiring insulin? No   Serum Creatinine > 2? Yes   Total cardiac risk factors 1     Functional mets >4 prior to orthopedic injury    < 4* METs -unable to walk > 2 blocks on level ground without stopping due to symptoms  - eating, dressing, toileting,  walking indoors, light housework. POOR   > 4* METs -climbing > 1 flight of stairs without stopping  -walking up hill > 1-2 blocks  -scrubbing floors  -moving furniture  - golf, bowling, dancing or tennis  -running short distance MODERATE to EXCELLENT   * performance of any one of the activities       Assessment/Plan:   Cardiovascular Risk Assessment:  Non-emergent surgery.  No active cardiac problems (such as unstable angina, decompensated heart failure, significant uncontrolled arrhythmias or severe valvular disease).  Intermediate risk surgery  Functional Status: able to climb a flight of stairs prior to orthopedic injury (> 4 METS)  His revised cardiac risk index is 1 correlating to a 0.9% risk of major cardiovascular event.     1 pt: Creatinine > 2     Recommendation:    -- Based on the above mentioned factors and pts PMHx and comorbidities he is at a slightly above average risk of 0.9% for major kendrick-operative cardiovascular event for this intermediate risk procedure.  -- would discontinue metoprolol  BID, recommend switching to Coreg BID for better BP control and adjusting dose per pts BP response . May also consider norvasc but keep in mind will take 2-3 days before full anti-hypertensive effects are seen.   -- continue to hold home ACEi pending resolution of AGUILAR  -- considering the late stage of this patient's cancer and likely need for adjuvant chemo would obtain oncologic 2D echo (specifically indicating LV strain) prior to the initiation of chemotherapy     Thank you for the consult. . It was a pleasure caring for this patient. Please contact w/ any questions or concerns  Torrie Medley MD  Cardiology Consults

## 2017-02-11 NOTE — PROGRESS NOTES
ORTHO PROGRESS NOTE:    Rah Puente is a 61 y.o. male admitted on 2/9/2017  Hospital Day: 2     The patient was seen and examined this morning at the bedside. No acute issues overnight.  Pain is stable    PHYSICAL EXAM:  Awake/alert/oriented x 3  No acute distress  AF. HTN to 200's systolic    Neuro: Diminished LTS bilateral C5 distrubution. Strength is 3/5 triceps/biceps/bracioradialis; wrist flexion/extension 4/5; finger abduction 4+/5.     Vitals:    02/11/17 0400 02/11/17 0446 02/11/17 0545 02/11/17 0622   BP:   (!) 200/94 (!) 172/82   BP Location:   Right arm Right arm   Patient Position:   Lying Lying   BP Method:   Automatic Automatic   Pulse:  65     Resp:  18     Temp:  97.9 °F (36.6 °C)     TempSrc:  Oral     SpO2:  98%     Weight: 76.3 kg (168 lb 3.4 oz)      Height:         I/O last 3 completed shifts:  In: 2195 [I.V.:1695; IV Piggyback:500]  Out: 2000 [Urine:2000]    Recent Labs  Lab 02/09/17  1945 02/10/17  0517   CALCIUM 9.3 9.0   PROT 7.8 7.0   * 122*   K 4.7 4.8   CO2 28 27   CL 79* 81*   BUN 49* 48*   CREATININE 3.4* 3.1*       Recent Labs  Lab 02/09/17  1944 02/10/17  0517   WBC 9.11 7.99   RBC 3.89* 3.61*   HGB 11.2* 10.6*   HCT 33.4* 30.5*    202       Recent Labs  Lab 02/09/17 1945   INR 1.0   APTT 28.8       A/P: 61 y.o. male with cervical myelopathy 2/2 pathologic C5 fracture  -- Pain control  -- PO Dexamethasone  -- Per radiation oncology, will plan for radiotherapy after surgery  -- OR on Monday, 2/13 for C5 corpectomy. NPO after midnight on Sunday.    Mike Casale, M.D. PGY-3  Department of Orthopedic Surgery

## 2017-02-11 NOTE — PROGRESS NOTES
"Progress Note  Hematology/Oncology    Patient Name: Rah Puente  YOB: 1955    Admit Date: 2/9/2017                     LOS: 2    SUBJECTIVE:     Reason for Admission:  Upper extremity weakness    Rah Puente is a 61 y.o. male with PMH of Stage I colon CA, s/p colectomy in March 2016, HTN, DM2, and BPH who presented to clinic for neck pain/weakness and UE weakness. Recent CT imaging and bone scan concerning for mets and patient admitted to inpatient for further workup. Patient states he first noted worsening UE weakness 2 weeks prior 1/27/17 which began in his right shoulder and progressed down his arm to his hand. The following week he noted similar symptoms of the left arm. Patient also noted worsening neck weakness and pain as he was unable to lift his neck. Of note, patient with worsening lower extremity weakness which he states first started 1 month prior and had worsening gait, s/p fall. Patient also endorses UE and LE neuropathy.     Patient did not have oncologic f/u after colectomy for Stage I colon cancer and was recently referred by ortho as patient with worsening neck/back pain and scans concerning for metastatic disease.      Interval history:   Hypertensive overnight requiring prn hydralazine and 5 mg labetalol iv. Per patient, BP at home runs from SBP 80s-110s. Admits to some pain but states "I'm ok." C-collar in place. Denies any worsening weakness, HA, vision changes, nausea, vomiting, constipation.      OBJECTIVE:     Vital Signs Range (Last 24H):  Temp:  [97.3 °F (36.3 °C)-98 °F (36.7 °C)]   Pulse:  [65-75]   Resp:  [18]   BP: (158-200)/(76-98)   SpO2:  [97 %-100 %] Body mass index is 21.59 kg/(m^2).    I & O (Last 24H):    Intake/Output Summary (Last 24 hours) at 02/11/17 0647  Last data filed at 02/11/17 0616   Gross per 24 hour   Intake          2151.67 ml   Output             2000 ml   Net           151.67 ml       Physical Exam:  Constitutional: He is " oriented to person, place, and time. No acute distress.   HEENT: Normocephalic, linear scab approximately 5 cm in length, no active discharge. Uvula is midline. Conjunctivae and EOM are normal. Pupils are equal, round, and reactive to light. C-collar in place.  Cardiovascular: Normal rate, regular rhythm, normal heart sounds and normal pulses.   No swelling noted to bilateral lower extremities.   Pulmonary/Chest: Effort normal and breath sounds normal. No respiratory distress. He has no wheezes. He has no rales.   Abdominal: Soft. Normal appearance and bowel sounds are normal. He exhibits no distension. There is no tenderness. There is no rebound and no guarding.   Neurological: He is alert and oriented to person, place, and time.   Noticeably decreased hand and motor strength of upper extremitie. No pain with forced movement of extremities above shoulders.   Bilateral UE: 2/5 lifting arms, 4/5 flexion and extension.   Bilateral LE: 4/5 strength hip flexion and knee flexion/extension  Finger to nose in tact.   Skin: Skin is warm, dry and intact. He is not diaphoretic. Erythematous maculopapular rash worse at dorsum of bilateral hands but tracing up the forearms bilaterally    Psychiatric: He has a normal mood and affect. His speech is normal and behavior is normal.     Medications:  Scheduled:   dexamethasone  8 mg Oral Q12H    fentaNYL  1 patch Transdermal Q72H    gabapentin  300 mg Oral TID    metoprolol succinate  200 mg Oral BID    senna-docusate 8.6-50 mg  1 tablet Oral Daily    tamsulosin  0.4 mg Oral BID       Infusions:   sodium chloride 0.9% 100 mL/hr at 02/10/17 1257       PRN:  dextrose 50%, dextrose 50%, dextrose 50%, glucagon (human recombinant), glucagon (human recombinant), glucose, glucose, hydrALAZINE, insulin aspart, ondansetron, oxycodone    Diagnostic Results:  Lab Results   Component Value Date    WBC 7.99 02/10/2017    HGB 10.6 (L) 02/10/2017    HCT 30.5 (L) 02/10/2017    MCV 85  "02/10/2017     02/10/2017     No results for input(s): GLU, NA, K, CL, CO2, BUN, CREATININE, CALCIUM, MG in the last 24 hours.  Lab Results   Component Value Date    INR 1.0 02/09/2017    INR 1.0 05/18/2014    INR 1.0 05/04/2012     Lab Results   Component Value Date    HGBA1C 6.5 (H) 02/09/2017     Recent Labs      02/10/17   1257  02/10/17   2305   POCTGLUCOSE  147*  289*     2/9/17 MRI brain showing ossesous calvarial mets but no acute intracranial structures   2/9/17 C-spine, T-spine, L-spine; compression deformity of C5 with retropulsion of the vertebral body and severe canal stenosis. Extensive osseous and pulmonary mets.     ASSESSMENT/PLAN:     Active Hospital Problems    Diagnosis  POA    *Upper extremity weakness [R29.898]  Yes    Metastatic cancer to bone [C79.51]  Yes      Resolved Hospital Problems    Diagnosis Date Resolved POA   No resolved problems to display.       Mr. Puente is a 62 y/o man with hx of colon CA (originally Stage I T2, N0), HTN, DM2, and BPH who presented with worsening UE weakness and imaging concerning for metastasis. ECOG 3.         Stage IV Metastatic CA, suspect recurrent colon cancer   - patient with intussusception s/p colectomy on 3/10/16, path report showing invasive well-differentiated adenocarcinoma extending into muscularis propria, T2, N0  - patient then lost to follow up as he was told it was completely resected  - presented to ortho for worsening neck/back pain. CT 1/21/17 revealed "multiple lytic lesions in the lower cervical/upper thoracic spine, and multiple lung nodules." Referred to Dr. Goetz for concern for metastatic disease   - CEA markedly elevated at 415   - will obtain PSA  - MRI unable to use contrast 2/2 AGUILAR   - MRI brain showing ossesous calvarial mets but no acute intracranial structures   - C-spine, T-spine, L-spine; extensive metastatic disease throughout spine and pelvis, pathologic compression fracture deformity of C5 vertebral body and " retropulsion of the posterior aspect of the C5 vertebral body     - patient previously following with Dr. Ng for neck and back pain. Planned for C5 corpectomy Monday 2/13/17.   - cards consult for pre-op clearance  - rad/onc consulted, appreciate assistance. Will plan for palliative radiotherapy after C5 corpectomy.   - retroperitoneal US consistent with chronic medical renal disease in addition to decreased perfusion of bilateral kidneys, distended bladder, and prostatomegaly; also showing likely liver mets   - continue decadron 8 mg bid   - oxy 10 mg q4h prn for pain and fentanyl patch which was increased to 75 mcg/hr today  - gabapentin 300 mg tid for neuropathic pain   - PT/OT     Essential HTN   - continue home metoprolol 200 mg bid  - will hold home quinapril 20 mg qHS in setting of AGUILAR   - prn hydralazine 25 mg po  - per patient, SBP between 80s-110s at home  - f/u cards consult for recommendations on BP control while inpatient      DM2  - on metformin at home   - HgbA1c 6.5  - will start on SSI   - gabapentin 300 mg tid for neuropathic pain   - per patient, glucose controlled in 120s at home, elevated yesterday but he states this happens when hospitalized, will continue to monitor     BPH  - patient mentioned he takes Flomax tid, however unconventional dosing per urology   - continue Flomax 0.4 mg bid     AGUILAR  - Cr 1.7 on 2/3, baseline around 1   - Cr on presentation 3.4, down to 2.4 today s/p IVF    - urine studies showing postobstructive picture   - urine U/S showing prostatomegaly and distended bladder in addition to chronic medical renal disease   - also concern could be 2/2 metastatic disease   - bladder scans q2h with low threshold for nguyen placement  - NS @ 100   - patient deferred urology consult yesterday. States decreased UOP when he misses doses of his flomax and that he is improved today     Hyponatremia  - likely hypovolemic hyponatremia  - continue IVF  - improving  - will monitor       DVT ppx: SQH, last dose tomorrow AM in setting of surgery Monday   Diet: Diabetic diet, NPO at midnight tomorrow for surgery Monday   Code: Full     Staff attestation to follow.      Carmen Yoder MD, PGY1      Attending Addendum:  The patient was seen, examined, and discussed on rounds with the fellow.  I agree with the assessment and plan as outlined for Rah Puente.      Shawn Ruiz DO, FACP  Hematology & Oncology  [facility] [place] [address]  ph. [phone]  Fax. [phone]

## 2017-02-11 NOTE — PLAN OF CARE
Problem: Patient Care Overview  Goal: Plan of Care Review  Outcome: Ongoing (interventions implemented as appropriate)  Pt is resting in bed, HOB up, C-collar in place. Visitors at the bedside throughout the day. Pt remains on bedrest, voiding well per urinal. Fentanyl patch dosage increased from 50 mcg to 75 mcg, placed right upper arm. Received 2 gm Mg IV, tolerated well. Carvedilol added to medication regimen. Pt will be NPO after midnight Sunday night for corpectomy on Monday. Oxy IR 10 mg works well for pain management. Bed in lowest position, side rails up x 2. Will continue to monitor.

## 2017-02-12 LAB
ABO + RH BLD: NORMAL
ALBUMIN SERPL BCP-MCNC: 2.7 G/DL
ALP SERPL-CCNC: 275 U/L
ALT SERPL W/O P-5'-P-CCNC: 17 U/L
ANION GAP SERPL CALC-SCNC: 10 MMOL/L
AST SERPL-CCNC: 40 U/L
BASOPHILS # BLD AUTO: 0 K/UL
BASOPHILS NFR BLD: 0 %
BILIRUB SERPL-MCNC: 0.4 MG/DL
BLD GP AB SCN CELLS X3 SERPL QL: NORMAL
BUN SERPL-MCNC: 38 MG/DL
CALCIUM SERPL-MCNC: 8.7 MG/DL
CHLORIDE SERPL-SCNC: 90 MMOL/L
CO2 SERPL-SCNC: 26 MMOL/L
CREAT SERPL-MCNC: 1.9 MG/DL
DIFFERENTIAL METHOD: ABNORMAL
EOSINOPHIL # BLD AUTO: 0 K/UL
EOSINOPHIL NFR BLD: 0 %
ERYTHROCYTE [DISTWIDTH] IN BLOOD BY AUTOMATED COUNT: 13.7 %
EST. GFR  (AFRICAN AMERICAN): 42.4 ML/MIN/1.73 M^2
EST. GFR  (NON AFRICAN AMERICAN): 36.7 ML/MIN/1.73 M^2
GLUCOSE SERPL-MCNC: 222 MG/DL
HCT VFR BLD AUTO: 29.8 %
HGB BLD-MCNC: 10.1 G/DL
LYMPHOCYTES # BLD AUTO: 0.6 K/UL
LYMPHOCYTES NFR BLD: 8.3 %
MAGNESIUM SERPL-MCNC: 1.8 MG/DL
MCH RBC QN AUTO: 29.1 PG
MCHC RBC AUTO-ENTMCNC: 33.9 %
MCV RBC AUTO: 86 FL
MONOCYTES # BLD AUTO: 0.4 K/UL
MONOCYTES NFR BLD: 5 %
NEUTROPHILS # BLD AUTO: 6.6 K/UL
NEUTROPHILS NFR BLD: 85.8 %
PHOSPHATE SERPL-MCNC: 3.3 MG/DL
PLATELET # BLD AUTO: 174 K/UL
PMV BLD AUTO: 9.3 FL
POCT GLUCOSE: 185 MG/DL (ref 70–110)
POCT GLUCOSE: 235 MG/DL (ref 70–110)
POCT GLUCOSE: 277 MG/DL (ref 70–110)
POCT GLUCOSE: 280 MG/DL (ref 70–110)
POTASSIUM SERPL-SCNC: 4.1 MMOL/L
PROT SERPL-MCNC: 6.5 G/DL
RBC # BLD AUTO: 3.47 M/UL
SODIUM SERPL-SCNC: 126 MMOL/L
WBC # BLD AUTO: 7.73 K/UL

## 2017-02-12 PROCEDURE — 63600175 PHARM REV CODE 636 W HCPCS: Performed by: STUDENT IN AN ORGANIZED HEALTH CARE EDUCATION/TRAINING PROGRAM

## 2017-02-12 PROCEDURE — 25000003 PHARM REV CODE 250: Performed by: STUDENT IN AN ORGANIZED HEALTH CARE EDUCATION/TRAINING PROGRAM

## 2017-02-12 PROCEDURE — 86920 COMPATIBILITY TEST SPIN: CPT

## 2017-02-12 PROCEDURE — 84100 ASSAY OF PHOSPHORUS: CPT

## 2017-02-12 PROCEDURE — 20600001 HC STEP DOWN PRIVATE ROOM

## 2017-02-12 PROCEDURE — 85025 COMPLETE CBC W/AUTO DIFF WBC: CPT

## 2017-02-12 PROCEDURE — 80053 COMPREHEN METABOLIC PANEL: CPT

## 2017-02-12 PROCEDURE — 83735 ASSAY OF MAGNESIUM: CPT

## 2017-02-12 PROCEDURE — 86900 BLOOD TYPING SEROLOGIC ABO: CPT

## 2017-02-12 PROCEDURE — 99232 SBSQ HOSP IP/OBS MODERATE 35: CPT | Mod: ,,, | Performed by: INTERNAL MEDICINE

## 2017-02-12 PROCEDURE — 86901 BLOOD TYPING SEROLOGIC RH(D): CPT

## 2017-02-12 PROCEDURE — 36415 COLL VENOUS BLD VENIPUNCTURE: CPT

## 2017-02-12 RX ADMIN — DEXAMETHASONE 8 MG: 4 TABLET ORAL at 09:02

## 2017-02-12 RX ADMIN — SODIUM CHLORIDE: 0.9 INJECTION, SOLUTION INTRAVENOUS at 03:02

## 2017-02-12 RX ADMIN — GABAPENTIN 300 MG: 300 CAPSULE ORAL at 05:02

## 2017-02-12 RX ADMIN — OXYCODONE HYDROCHLORIDE 10 MG: 5 TABLET ORAL at 05:02

## 2017-02-12 RX ADMIN — GABAPENTIN 300 MG: 300 CAPSULE ORAL at 10:02

## 2017-02-12 RX ADMIN — OXYCODONE HYDROCHLORIDE 10 MG: 5 TABLET ORAL at 10:02

## 2017-02-12 RX ADMIN — DEXAMETHASONE 8 MG: 4 TABLET ORAL at 08:02

## 2017-02-12 RX ADMIN — HYDRALAZINE HYDROCHLORIDE 25 MG: 25 TABLET ORAL at 02:02

## 2017-02-12 RX ADMIN — TAMSULOSIN HYDROCHLORIDE 0.4 MG: 0.4 CAPSULE ORAL at 09:02

## 2017-02-12 RX ADMIN — INSULIN DETEMIR 10 UNITS: 100 INJECTION, SOLUTION SUBCUTANEOUS at 11:02

## 2017-02-12 RX ADMIN — HEPARIN SODIUM 5000 UNITS: 5000 INJECTION, SOLUTION INTRAVENOUS; SUBCUTANEOUS at 05:02

## 2017-02-12 RX ADMIN — STANDARDIZED SENNA CONCENTRATE AND DOCUSATE SODIUM 1 TABLET: 8.6; 5 TABLET, FILM COATED ORAL at 08:02

## 2017-02-12 RX ADMIN — INSULIN ASPART 2 UNITS: 100 INJECTION, SOLUTION INTRAVENOUS; SUBCUTANEOUS at 12:02

## 2017-02-12 RX ADMIN — INSULIN ASPART 3 UNITS: 100 INJECTION, SOLUTION INTRAVENOUS; SUBCUTANEOUS at 05:02

## 2017-02-12 RX ADMIN — TAMSULOSIN HYDROCHLORIDE 0.4 MG: 0.4 CAPSULE ORAL at 08:02

## 2017-02-12 RX ADMIN — CARVEDILOL 25 MG: 25 TABLET, FILM COATED ORAL at 08:02

## 2017-02-12 RX ADMIN — INSULIN ASPART 3 UNITS: 100 INJECTION, SOLUTION INTRAVENOUS; SUBCUTANEOUS at 11:02

## 2017-02-12 RX ADMIN — CARVEDILOL 25 MG: 25 TABLET, FILM COATED ORAL at 09:02

## 2017-02-12 RX ADMIN — HYDRALAZINE HYDROCHLORIDE 25 MG: 25 TABLET ORAL at 08:02

## 2017-02-12 RX ADMIN — GABAPENTIN 300 MG: 300 CAPSULE ORAL at 02:02

## 2017-02-12 RX ADMIN — OXYCODONE HYDROCHLORIDE 10 MG: 5 TABLET ORAL at 08:02

## 2017-02-12 NOTE — PROGRESS NOTES
Progress Note  Hematology/Oncology    Patient Name: Rah Puente  YOB: 1955    Admit Date: 2/9/2017                     LOS: 3    SUBJECTIVE:     Reason for Admission:  Upper extremity weakness    Rah Puente is a 61 y.o. male with PMH of Stage I colon CA, s/p colectomy in March 2016, HTN, DM2, and BPH who presented to clinic for neck pain/weakness and UE weakness. Recent CT imaging and bone scan concerning for mets and patient admitted to inpatient for further workup. Patient states he first noted worsening UE weakness 2 weeks prior 1/27/17 which began in his right shoulder and progressed down his arm to his hand. The following week he noted similar symptoms of the left arm. Patient also noted worsening neck weakness and pain as he was unable to lift his neck. Of note, patient with worsening lower extremity weakness which he states first started 1 month prior and had worsening gait, s/p fall. Patient also endorses UE and LE neuropathy.     Patient did not have oncologic f/u after colectomy for Stage I colon cancer and was recently referred by ortho as patient with worsening neck/back pain and scans concerning for metastatic disease.      Interval history:   Patient remains hypertensive overnight.  His BP at the moment is 170/80.  Patients BP medication was changed by cardiology from metoprolol to coreg. Besides this, patient states that his pain is manageable.     OBJECTIVE:     Vital Signs Range (Last 24H):  Temp:  [97.5 °F (36.4 °C)-102.4 °F (39.1 °C)]   Pulse:  [64-68]   Resp:  [17-18]   BP: (162-179)/(74-91)   SpO2:  [97 %-100 %] Body mass index is 21.84 kg/(m^2).    I & O (Last 24H):    Intake/Output Summary (Last 24 hours) at 02/12/17 0756  Last data filed at 02/12/17 0600   Gross per 24 hour   Intake             1560 ml   Output             2050 ml   Net             -490 ml       Physical Exam:  Constitutional: He is oriented to person, place, and time. No acute distress.    HEENT: Normocephalic, linear scab approximately 5 cm in length, no active discharge. Uvula is midline. Conjunctivae and EOM are normal. Pupils are equal, round, and reactive to light. C-collar in place.  Cardiovascular: Normal rate, regular rhythm, normal heart sounds and normal pulses.   No swelling noted to bilateral lower extremities.   Pulmonary/Chest: Effort normal and breath sounds normal. No respiratory distress. He has no wheezes. He has no rales.   Abdominal: Soft. Normal appearance and bowel sounds are normal. He exhibits no distension. There is no tenderness. There is no rebound and no guarding.   Neurological: He is alert and oriented to person, place, and time.   Noticeably decreased hand and motor strength of upper extremitie. No pain with forced movement of extremities above shoulders.   Bilateral UE: 2/5 lifting arms, 4/5 flexion and extension.   Bilateral LE: 4/5 strength hip flexion and knee flexion/extension  Finger to nose in tact.   Skin: Skin is warm, dry and intact. He is not diaphoretic. Erythematous maculopapular rash worse at dorsum of bilateral hands but tracing up the forearms bilaterally    Psychiatric: He has a normal mood and affect. His speech is normal and behavior is normal.     Medications:  Scheduled:   carvedilol  25 mg Oral BID    dexamethasone  8 mg Oral Q12H    fentaNYL  1 patch Transdermal Q72H    gabapentin  300 mg Oral TID    senna-docusate 8.6-50 mg  1 tablet Oral Daily    tamsulosin  0.4 mg Oral BID       Infusions:   sodium chloride 0.9% 100 mL/hr at 02/11/17 1707       PRN:  dextrose 50%, dextrose 50%, dextrose 50%, glucagon (human recombinant), glucagon (human recombinant), glucose, glucose, hydrALAZINE, insulin aspart, ondansetron, oxycodone    Diagnostic Results:  Lab Results   Component Value Date    WBC 7.73 02/12/2017    HGB 10.1 (L) 02/12/2017    HCT 29.8 (L) 02/12/2017    MCV 86 02/12/2017     02/12/2017       Recent Labs  Lab 02/12/17  2810  "  *   *   K 4.1   CL 90*   CO2 26   BUN 38*   CREATININE 1.9*   CALCIUM 8.7   MG 1.8     Lab Results   Component Value Date    INR 1.0 02/09/2017    INR 1.0 05/18/2014    INR 1.0 05/04/2012     Lab Results   Component Value Date    HGBA1C 6.5 (H) 02/09/2017     Recent Labs      02/10/17   1257  02/10/17   2305  02/11/17   0553  02/11/17   1228  02/11/17   1745  02/12/17   0049  02/12/17   0634   POCTGLUCOSE  147*  289*  230*  289*  257*  235*  185*     2/9/17 MRI brain showing ossesous calvarial mets but no acute intracranial structures   2/9/17 C-spine, T-spine, L-spine; compression deformity of C5 with retropulsion of the vertebral body and severe canal stenosis. Extensive osseous and pulmonary mets.     ASSESSMENT/PLAN:     Active Hospital Problems    Diagnosis  POA    *Upper extremity weakness [R29.898]  Yes    Metastatic cancer to bone [C79.51]  Yes      Resolved Hospital Problems    Diagnosis Date Resolved POA   No resolved problems to display.       Mr. Puente is a 62 y/o man with hx of colon CA (originally Stage I T2, N0), HTN, DM2, and BPH who presented with worsening UE weakness and imaging concerning for found to have bone, brain and lung mets. ECOG 3.         Stage IV Metastatic CA, suspect recurrent colon cancer   - patient with intussusception s/p colectomy on 3/10/16, path report showing invasive well-differentiated adenocarcinoma extending into muscularis propria, T2, N0  - patient then lost to follow up as he was told it was completely resected  - presented to ortho for worsening neck/back pain. CT 1/21/17 revealed "multiple lytic lesions in the lower cervical/upper thoracic spine, and multiple lung nodules." Referred to Dr. Goetz for concern for metastatic disease   - CEA markedly elevated at 415   - PSA=0.33  - MRI unable to use contrast 2/2 AGUILAR   - MRI brain showing ossesous calvarial mets but no acute intracranial structures   - C-spine, T-spine, L-spine; extensive metastatic " disease throughout spine and pelvis, pathologic compression fracture deformity of C5 vertebral body and retropulsion of the posterior aspect of the C5 vertebral body     - patient previously following with Dr. Ng for neck and back pain. Planned for C5 corpectomy Monday 2/13/17.   - cards cleared patient for surgery  - rad/onc consulted, appreciate assistance. Will plan for palliative radiotherapy after C5 corpectomy.   - retroperitoneal US consistent with chronic medical renal disease in addition to decreased perfusion of bilateral kidneys, distended bladder, and prostatomegaly; also showing likely liver mets   - continue decadron 8 mg bid   - oxy 10 mg q4h prn for pain and fentanyl patch which was increased to 75 mcg/hr today  - gabapentin 300 mg tid for neuropathic pain   - PT/OT     Essential HTN   - will hold home quinapril 20 mg qHS in setting of AGUILAR   - prn hydralazine 25 mg po  - per patient, SBP between 80s-110s at home  - as per cardiology, will hold home metoprolol 200 mg bid and start patient on 25 coreg BID  -Continues to be elevated this AM with SBP >180, would consider adding Norvasc     DM2  - on metformin at home   - HgbA1c 6.5  - will start on SSI   - gabapentin 300 mg tid for neuropathic pain   - per patient, glucose controlled in 120s at home  -on SSI  -will add 10U long acting as patients BG values are elevated in past 24 hours     BPH  - continue Flomax 0.4 mg bid     AGUILAR  - Cr 1.7 on 2/3, baseline around 1   - Cr on presentation 3.4, down to 1.9 today s/p IVF    - urine studies showing postobstructive picture   - urine U/S showing prostatomegaly and distended bladder in addition to chronic medical renal disease   - also concern could be 2/2 metastatic disease   - bladder scans q2h with low threshold for nguyen placement  - NS @ 100   - patient deferred urology consult yesterday. States decreased UOP when he misses doses of his flomax and that he is improved today     Hyponatremia  - likely  hypovolemic hyponatremia  - continue IVF  - improving  - will monitor      DVT ppx: SQH, last dose tomorrow AM in setting of surgery Monday   Diet: Diabetic diet, NPO at midnight  for surgery Monday   Code: Full     Staff attestation to follow.    Clarissa Santizo    Attending Addendum:  The patient was seen, examined, and discussed on rounds with the fellow.  I agree with the assessment and plan as outlined for Rah Puente.      Shawn Ruiz DO, FACP  Hematology & Oncology  [facility] [place] [address]  ph. [phone]  Fax. [phone]

## 2017-02-12 NOTE — PROGRESS NOTES
ORTHO PROGRESS NOTE:    Rah Puente is a 61 y.o. male admitted on 2/9/2017  Hospital Day: 3     The patient was seen and examined this morning at the bedside. No acute issues overnight.  Pain is stable; ready for surgery tomorrow    PHYSICAL EXAM:  Awake/alert/oriented x 3; C-collar in place  No acute distress  AF. HTN to 170's systolic    Neuro: Diminished LTS bilateral C5 distrubution. Strength is 3/5 triceps/biceps/bracioradialis; wrist flexion/extension 4/5; finger abduction 4+/5.     Vitals:    02/11/17 2133 02/12/17 0033 02/12/17 0426 02/12/17 0600   BP:  (!) 169/75 (!) 170/80    BP Location:  Right arm Right arm    Patient Position:  Sitting Sitting    BP Method:  Automatic Automatic    Pulse: 67 64 68    Resp:  18 18    Temp:  98.2 °F (36.8 °C) 97.9 °F (36.6 °C)    TempSrc:  Oral Oral    SpO2:  97% 97%    Weight:    77.2 kg (170 lb 3.1 oz)   Height:         I/O last 3 completed shifts:  In: 1156.7 [P.O.:660; I.V.:496.7]  Out: 3050 [Urine:3050]    Recent Labs  Lab 02/10/17  0517 02/11/17  0609 02/12/17  0524   CALCIUM 9.0 8.9 8.7   PROT 7.0 6.7 6.5   * 125* 126*   K 4.8 4.7 4.1   CO2 27 30* 26   CL 81* 85* 90*   BUN 48* 43* 38*   CREATININE 3.1* 2.3* 1.9*       Recent Labs  Lab 02/10/17  0517 02/11/17  0609 02/12/17  0524   WBC 7.99 7.50 7.73   RBC 3.61* 3.61* 3.47*   HGB 10.6* 10.5* 10.1*   HCT 30.5* 30.3* 29.8*    203 174       Recent Labs  Lab 02/09/17  1945   INR 1.0   APTT 28.8       A/P: 61 y.o. male with cervical myelopathy 2/2 pathologic C5 fracture  -- Pain control  -- PO Dexamethasone  -- Per radiation oncology, will plan for radiotherapy after surgery  -- OR on Monday, 2/13 for C5 corpectomy. NPO after midnight tonight  --cleared for surgery by cardiology

## 2017-02-12 NOTE — PLAN OF CARE
Problem: Patient Care Overview  Goal: Plan of Care Review  Outcome: Ongoing (interventions implemented as appropriate)  Side rails up x2; call bell in place; bed in lowest, locked position; skid proof socks on; no evidence of skin breakdown; care plan explained to patient; pt remains free of injury. Pt tolerated taking in clears, voids per urinal, c-collar on, pt turned and repositioned. Pain medication given for pain, vss and afebrile. CBG monitoring maintained and insulin coverage given.

## 2017-02-12 NOTE — NURSING
Pt's temp noted to be creeping up since 1600. Temp 99.7 (o). 1700 - 100.3. Pt c/o shaking chills, med onc resident notified. Temp at 1800 is 102.4, shaking chills persist, s/w Dr. Montalvo, notified him I was giving the pt tylenol, he stated he would order labs.

## 2017-02-13 ENCOUNTER — ANESTHESIA (OUTPATIENT)
Dept: SURGERY | Facility: HOSPITAL | Age: 62
DRG: 472 | End: 2017-02-13
Payer: COMMERCIAL

## 2017-02-13 LAB
ALBUMIN SERPL BCP-MCNC: 2.3 G/DL
ALBUMIN SERPL BCP-MCNC: 2.7 G/DL
ALP SERPL-CCNC: 250 U/L
ALP SERPL-CCNC: 290 U/L
ALT SERPL W/O P-5'-P-CCNC: 23 U/L
ALT SERPL W/O P-5'-P-CCNC: 24 U/L
ANION GAP SERPL CALC-SCNC: 11 MMOL/L
ANION GAP SERPL CALC-SCNC: 9 MMOL/L
APTT BLDCRRT: 23.1 SEC
AST SERPL-CCNC: 40 U/L
AST SERPL-CCNC: 42 U/L
BASOPHILS # BLD AUTO: 0 K/UL
BASOPHILS # BLD AUTO: 0 K/UL
BASOPHILS NFR BLD: 0 %
BASOPHILS NFR BLD: 0 %
BILIRUB SERPL-MCNC: 0.3 MG/DL
BILIRUB SERPL-MCNC: 0.4 MG/DL
BUN SERPL-MCNC: 34 MG/DL
BUN SERPL-MCNC: 36 MG/DL
CALCIUM SERPL-MCNC: 8.1 MG/DL
CALCIUM SERPL-MCNC: 8.9 MG/DL
CHLORIDE SERPL-SCNC: 90 MMOL/L
CHLORIDE SERPL-SCNC: 95 MMOL/L
CO2 SERPL-SCNC: 22 MMOL/L
CO2 SERPL-SCNC: 25 MMOL/L
CREAT SERPL-MCNC: 1.5 MG/DL
CREAT SERPL-MCNC: 1.7 MG/DL
DIFFERENTIAL METHOD: ABNORMAL
DIFFERENTIAL METHOD: ABNORMAL
EOSINOPHIL # BLD AUTO: 0 K/UL
EOSINOPHIL # BLD AUTO: 0 K/UL
EOSINOPHIL NFR BLD: 0 %
EOSINOPHIL NFR BLD: 0.2 %
ERYTHROCYTE [DISTWIDTH] IN BLOOD BY AUTOMATED COUNT: 13.3 %
ERYTHROCYTE [DISTWIDTH] IN BLOOD BY AUTOMATED COUNT: 13.5 %
EST. GFR  (AFRICAN AMERICAN): 48.5 ML/MIN/1.73 M^2
EST. GFR  (AFRICAN AMERICAN): 56.5 ML/MIN/1.73 M^2
EST. GFR  (NON AFRICAN AMERICAN): 42 ML/MIN/1.73 M^2
EST. GFR  (NON AFRICAN AMERICAN): 48.8 ML/MIN/1.73 M^2
GLUCOSE SERPL-MCNC: 201 MG/DL
GLUCOSE SERPL-MCNC: 201 MG/DL
GRAM STN SPEC: NORMAL
GRAM STN SPEC: NORMAL
HCT VFR BLD AUTO: 27.2 %
HCT VFR BLD AUTO: 30.9 %
HGB BLD-MCNC: 10.4 G/DL
HGB BLD-MCNC: 9.4 G/DL
INR PPP: 1
LYMPHOCYTES # BLD AUTO: 0.5 K/UL
LYMPHOCYTES # BLD AUTO: 0.6 K/UL
LYMPHOCYTES NFR BLD: 7 %
LYMPHOCYTES NFR BLD: 7.2 %
MAGNESIUM SERPL-MCNC: 1.5 MG/DL
MCH RBC QN AUTO: 28.8 PG
MCH RBC QN AUTO: 29.2 PG
MCHC RBC AUTO-ENTMCNC: 33.7 %
MCHC RBC AUTO-ENTMCNC: 34.6 %
MCV RBC AUTO: 85 FL
MCV RBC AUTO: 86 FL
MONOCYTES # BLD AUTO: 0.3 K/UL
MONOCYTES # BLD AUTO: 0.9 K/UL
MONOCYTES NFR BLD: 10 %
MONOCYTES NFR BLD: 4 %
NEUTROPHILS # BLD AUTO: 5.5 K/UL
NEUTROPHILS # BLD AUTO: 7 K/UL
NEUTROPHILS NFR BLD: 82.1 %
NEUTROPHILS NFR BLD: 87.5 %
PHOSPHATE SERPL-MCNC: 2.8 MG/DL
PLATELET # BLD AUTO: 138 K/UL
PLATELET # BLD AUTO: 177 K/UL
PMV BLD AUTO: 8.9 FL
PMV BLD AUTO: 9.5 FL
POCT GLUCOSE: 169 MG/DL (ref 70–110)
POCT GLUCOSE: 187 MG/DL (ref 70–110)
POCT GLUCOSE: 214 MG/DL (ref 70–110)
POCT GLUCOSE: 224 MG/DL (ref 70–110)
POTASSIUM SERPL-SCNC: 4.1 MMOL/L
POTASSIUM SERPL-SCNC: 4.8 MMOL/L
PROT SERPL-MCNC: 5.8 G/DL
PROT SERPL-MCNC: 6.5 G/DL
PROTHROMBIN TIME: 10.6 SEC
RBC # BLD AUTO: 3.22 M/UL
RBC # BLD AUTO: 3.61 M/UL
SODIUM SERPL-SCNC: 126 MMOL/L
SODIUM SERPL-SCNC: 126 MMOL/L
WBC # BLD AUTO: 6.27 K/UL
WBC # BLD AUTO: 8.48 K/UL

## 2017-02-13 PROCEDURE — 71000039 HC RECOVERY, EACH ADD'L HOUR: Performed by: ORTHOPAEDIC SURGERY

## 2017-02-13 PROCEDURE — 22554 ARTHRD ANT NTRBD MIN DSC CRV: CPT | Mod: 51,62,, | Performed by: NEUROLOGICAL SURGERY

## 2017-02-13 PROCEDURE — 71000033 HC RECOVERY, INTIAL HOUR: Performed by: ORTHOPAEDIC SURGERY

## 2017-02-13 PROCEDURE — 63600175 PHARM REV CODE 636 W HCPCS: Performed by: ORTHOPAEDIC SURGERY

## 2017-02-13 PROCEDURE — 25000003 PHARM REV CODE 250: Performed by: ORTHOPAEDIC SURGERY

## 2017-02-13 PROCEDURE — 36000710: Performed by: ORTHOPAEDIC SURGERY

## 2017-02-13 PROCEDURE — 36415 COLL VENOUS BLD VENIPUNCTURE: CPT

## 2017-02-13 PROCEDURE — 22585 ARTHRD ANT NTRBD MIN DSC EA: CPT | Mod: 62,,, | Performed by: ORTHOPAEDIC SURGERY

## 2017-02-13 PROCEDURE — 27800903 OPTIME MED/SURG SUP & DEVICES OTHER IMPLANTS: Performed by: ORTHOPAEDIC SURGERY

## 2017-02-13 PROCEDURE — 82962 GLUCOSE BLOOD TEST: CPT

## 2017-02-13 PROCEDURE — 63600175 PHARM REV CODE 636 W HCPCS: Performed by: STUDENT IN AN ORGANIZED HEALTH CARE EDUCATION/TRAINING PROGRAM

## 2017-02-13 PROCEDURE — 25000003 PHARM REV CODE 250: Performed by: STUDENT IN AN ORGANIZED HEALTH CARE EDUCATION/TRAINING PROGRAM

## 2017-02-13 PROCEDURE — 63600175 PHARM REV CODE 636 W HCPCS: Performed by: INTERNAL MEDICINE

## 2017-02-13 PROCEDURE — 85610 PROTHROMBIN TIME: CPT

## 2017-02-13 PROCEDURE — 87070 CULTURE OTHR SPECIMN AEROBIC: CPT

## 2017-02-13 PROCEDURE — 63600175 PHARM REV CODE 636 W HCPCS

## 2017-02-13 PROCEDURE — 63081 REMOVE VERT BODY DCMPRN CRVL: CPT | Mod: 62,,, | Performed by: ORTHOPAEDIC SURGERY

## 2017-02-13 PROCEDURE — 88304 TISSUE EXAM BY PATHOLOGIST: CPT | Mod: 26,,, | Performed by: PATHOLOGY

## 2017-02-13 PROCEDURE — D9220A PRA ANESTHESIA: Mod: CRNA,,, | Performed by: NURSE ANESTHETIST, CERTIFIED REGISTERED

## 2017-02-13 PROCEDURE — 63082 REMOVE VERTEBRAL BODY ADD-ON: CPT | Mod: 62,,, | Performed by: ORTHOPAEDIC SURGERY

## 2017-02-13 PROCEDURE — 87205 SMEAR GRAM STAIN: CPT

## 2017-02-13 PROCEDURE — 87102 FUNGUS ISOLATION CULTURE: CPT

## 2017-02-13 PROCEDURE — 4A11X4G MONITORING OF PERIPHERAL NERVOUS ELECTRICAL ACTIVITY, INTRAOPERATIVE, EXTERNAL APPROACH: ICD-10-PCS | Performed by: ORTHOPAEDIC SURGERY

## 2017-02-13 PROCEDURE — 84100 ASSAY OF PHOSPHORUS: CPT

## 2017-02-13 PROCEDURE — 87075 CULTR BACTERIA EXCEPT BLOOD: CPT

## 2017-02-13 PROCEDURE — 85025 COMPLETE CBC W/AUTO DIFF WBC: CPT

## 2017-02-13 PROCEDURE — 25000003 PHARM REV CODE 250: Performed by: ANESTHESIOLOGY

## 2017-02-13 PROCEDURE — 20930 SP BONE ALGRFT MORSEL ADD-ON: CPT | Mod: ,,, | Performed by: ORTHOPAEDIC SURGERY

## 2017-02-13 PROCEDURE — 88311 DECALCIFY TISSUE: CPT | Mod: 26,,, | Performed by: PATHOLOGY

## 2017-02-13 PROCEDURE — 87116 MYCOBACTERIA CULTURE: CPT

## 2017-02-13 PROCEDURE — 99232 SBSQ HOSP IP/OBS MODERATE 35: CPT | Mod: ,,, | Performed by: INTERNAL MEDICINE

## 2017-02-13 PROCEDURE — 85730 THROMBOPLASTIN TIME PARTIAL: CPT

## 2017-02-13 PROCEDURE — D9220A PRA ANESTHESIA: Mod: ANES,,, | Performed by: ANESTHESIOLOGY

## 2017-02-13 PROCEDURE — 80053 COMPREHEN METABOLIC PANEL: CPT

## 2017-02-13 PROCEDURE — 27201423 OPTIME MED/SURG SUP & DEVICES STERILE SUPPLY: Performed by: ORTHOPAEDIC SURGERY

## 2017-02-13 PROCEDURE — 37000008 HC ANESTHESIA 1ST 15 MINUTES: Performed by: ORTHOPAEDIC SURGERY

## 2017-02-13 PROCEDURE — 22846 INSERT SPINE FIXATION DEVICE: CPT | Mod: ,,, | Performed by: ORTHOPAEDIC SURGERY

## 2017-02-13 PROCEDURE — C1729 CATH, DRAINAGE: HCPCS | Performed by: ORTHOPAEDIC SURGERY

## 2017-02-13 PROCEDURE — 88311 DECALCIFY TISSUE: CPT | Performed by: PATHOLOGY

## 2017-02-13 PROCEDURE — 63600175 PHARM REV CODE 636 W HCPCS: Performed by: ANESTHESIOLOGY

## 2017-02-13 PROCEDURE — 63082 REMOVE VERTEBRAL BODY ADD-ON: CPT | Mod: 62,,, | Performed by: NEUROLOGICAL SURGERY

## 2017-02-13 PROCEDURE — 22554 ARTHRD ANT NTRBD MIN DSC CRV: CPT | Mod: 62,51,, | Performed by: ORTHOPAEDIC SURGERY

## 2017-02-13 PROCEDURE — 37000009 HC ANESTHESIA EA ADD 15 MINS: Performed by: ORTHOPAEDIC SURGERY

## 2017-02-13 PROCEDURE — 87176 TISSUE HOMOGENIZATION CULTR: CPT

## 2017-02-13 PROCEDURE — 83735 ASSAY OF MAGNESIUM: CPT

## 2017-02-13 PROCEDURE — C1713 ANCHOR/SCREW BN/BN,TIS/BN: HCPCS | Performed by: ORTHOPAEDIC SURGERY

## 2017-02-13 PROCEDURE — 20600001 HC STEP DOWN PRIVATE ROOM

## 2017-02-13 PROCEDURE — 51798 US URINE CAPACITY MEASURE: CPT

## 2017-02-13 PROCEDURE — 0RG20A0 FUSION OF 2 OR MORE CERVICAL VERTEBRAL JOINTS WITH INTERBODY FUSION DEVICE, ANTERIOR APPROACH, ANTERIOR COLUMN, OPEN APPROACH: ICD-10-PCS | Performed by: ORTHOPAEDIC SURGERY

## 2017-02-13 PROCEDURE — 22854 INSJ BIOMECHANICAL DEVICE: CPT | Mod: ,,, | Performed by: ORTHOPAEDIC SURGERY

## 2017-02-13 PROCEDURE — 36000711: Performed by: ORTHOPAEDIC SURGERY

## 2017-02-13 PROCEDURE — 27201037 HC PRESSURE MONITORING SET UP

## 2017-02-13 PROCEDURE — 63081 REMOVE VERT BODY DCMPRN CRVL: CPT | Mod: 62,,, | Performed by: NEUROLOGICAL SURGERY

## 2017-02-13 PROCEDURE — 0W9K00Z DRAINAGE OF UPPER BACK WITH DRAINAGE DEVICE, OPEN APPROACH: ICD-10-PCS | Performed by: ORTHOPAEDIC SURGERY

## 2017-02-13 PROCEDURE — 36620 INSERTION CATHETER ARTERY: CPT | Mod: 59,,, | Performed by: ANESTHESIOLOGY

## 2017-02-13 PROCEDURE — 22585 ARTHRD ANT NTRBD MIN DSC EA: CPT | Mod: 62,,, | Performed by: NEUROLOGICAL SURGERY

## 2017-02-13 DEVICE — IMPLANTABLE DEVICE: Type: IMPLANTABLE DEVICE | Site: NECK | Status: FUNCTIONAL

## 2017-02-13 DEVICE — PUTTY DBX 5CC: Type: IMPLANTABLE DEVICE | Site: SPINE CERVICAL | Status: FUNCTIONAL

## 2017-02-13 RX ORDER — DEXAMETHASONE SODIUM PHOSPHATE 10 MG/ML
10 INJECTION INTRAMUSCULAR; INTRAVENOUS EVERY 8 HOURS
Status: DISCONTINUED | OUTPATIENT
Start: 2017-02-13 | End: 2017-02-13

## 2017-02-13 RX ORDER — CEFAZOLIN SODIUM 2 G/50ML
2 SOLUTION INTRAVENOUS
Status: COMPLETED | OUTPATIENT
Start: 2017-02-13 | End: 2017-02-14

## 2017-02-13 RX ORDER — ACETAMINOPHEN 10 MG/ML
1000 INJECTION, SOLUTION INTRAVENOUS EVERY 8 HOURS
Status: COMPLETED | OUTPATIENT
Start: 2017-02-13 | End: 2017-02-14

## 2017-02-13 RX ORDER — METHOCARBAMOL 750 MG/1
750 TABLET, FILM COATED ORAL 3 TIMES DAILY PRN
Qty: 45 TABLET | Refills: 0 | Status: ON HOLD | OUTPATIENT
Start: 2017-02-13 | End: 2017-03-04 | Stop reason: HOSPADM

## 2017-02-13 RX ORDER — MIDAZOLAM HYDROCHLORIDE 1 MG/ML
INJECTION, SOLUTION INTRAMUSCULAR; INTRAVENOUS
Status: DISCONTINUED | OUTPATIENT
Start: 2017-02-13 | End: 2017-02-13

## 2017-02-13 RX ORDER — SODIUM CHLORIDE, SODIUM LACTATE, POTASSIUM CHLORIDE, CALCIUM CHLORIDE 600; 310; 30; 20 MG/100ML; MG/100ML; MG/100ML; MG/100ML
INJECTION, SOLUTION INTRAVENOUS CONTINUOUS PRN
Status: DISCONTINUED | OUTPATIENT
Start: 2017-02-13 | End: 2017-02-13

## 2017-02-13 RX ORDER — OXYCODONE HCL 10 MG/1
10 TABLET, FILM COATED, EXTENDED RELEASE ORAL EVERY 12 HOURS
Status: COMPLETED | OUTPATIENT
Start: 2017-02-13 | End: 2017-02-15

## 2017-02-13 RX ORDER — SODIUM CHLORIDE 0.9 % (FLUSH) 0.9 %
3 SYRINGE (ML) INJECTION
Status: DISCONTINUED | OUTPATIENT
Start: 2017-02-13 | End: 2017-02-17 | Stop reason: HOSPADM

## 2017-02-13 RX ORDER — AMLODIPINE BESYLATE 5 MG/1
5 TABLET ORAL DAILY
Status: DISCONTINUED | OUTPATIENT
Start: 2017-02-13 | End: 2017-02-14

## 2017-02-13 RX ORDER — OXYCODONE AND ACETAMINOPHEN 10; 325 MG/1; MG/1
1 TABLET ORAL EVERY 6 HOURS PRN
Qty: 90 TABLET | Refills: 0 | Status: ON HOLD | OUTPATIENT
Start: 2017-02-13 | End: 2017-03-04 | Stop reason: HOSPADM

## 2017-02-13 RX ORDER — HYDROMORPHONE HYDROCHLORIDE 1 MG/ML
0.2 INJECTION, SOLUTION INTRAMUSCULAR; INTRAVENOUS; SUBCUTANEOUS EVERY 5 MIN PRN
Status: DISCONTINUED | OUTPATIENT
Start: 2017-02-13 | End: 2017-02-13 | Stop reason: HOSPADM

## 2017-02-13 RX ORDER — HYDRALAZINE HYDROCHLORIDE 20 MG/ML
10 INJECTION INTRAMUSCULAR; INTRAVENOUS ONCE
Status: COMPLETED | OUTPATIENT
Start: 2017-02-13 | End: 2017-02-13

## 2017-02-13 RX ORDER — LIDOCAINE HCL/PF 100 MG/5ML
SYRINGE (ML) INTRAVENOUS
Status: DISCONTINUED | OUTPATIENT
Start: 2017-02-13 | End: 2017-02-13

## 2017-02-13 RX ORDER — HYDROMORPHONE HYDROCHLORIDE 1 MG/ML
1 INJECTION, SOLUTION INTRAMUSCULAR; INTRAVENOUS; SUBCUTANEOUS
Status: DISCONTINUED | OUTPATIENT
Start: 2017-02-13 | End: 2017-02-17 | Stop reason: HOSPADM

## 2017-02-13 RX ORDER — HYDRALAZINE HYDROCHLORIDE 20 MG/ML
INJECTION INTRAMUSCULAR; INTRAVENOUS
Status: DISPENSED
Start: 2017-02-13 | End: 2017-02-14

## 2017-02-13 RX ORDER — KETAMINE HYDROCHLORIDE 100 MG/ML
INJECTION, SOLUTION INTRAMUSCULAR; INTRAVENOUS
Status: DISCONTINUED | OUTPATIENT
Start: 2017-02-13 | End: 2017-02-13

## 2017-02-13 RX ORDER — FAMOTIDINE 20 MG/1
20 TABLET, FILM COATED ORAL 2 TIMES DAILY
Status: DISCONTINUED | OUTPATIENT
Start: 2017-02-13 | End: 2017-02-17 | Stop reason: HOSPADM

## 2017-02-13 RX ORDER — ONDANSETRON 8 MG/1
8 TABLET, ORALLY DISINTEGRATING ORAL EVERY 8 HOURS PRN
Qty: 12 TABLET | Refills: 2 | Status: ON HOLD | OUTPATIENT
Start: 2017-02-13 | End: 2017-03-04 | Stop reason: HOSPADM

## 2017-02-13 RX ORDER — PROPOFOL 10 MG/ML
VIAL (ML) INTRAVENOUS
Status: DISCONTINUED | OUTPATIENT
Start: 2017-02-13 | End: 2017-02-13

## 2017-02-13 RX ORDER — DEXAMETHASONE SODIUM PHOSPHATE 10 MG/ML
10 INJECTION INTRAMUSCULAR; INTRAVENOUS EVERY 8 HOURS
Status: DISCONTINUED | OUTPATIENT
Start: 2017-02-14 | End: 2017-02-13

## 2017-02-13 RX ORDER — PROMETHAZINE HYDROCHLORIDE 12.5 MG/1
12.5 TABLET ORAL EVERY 8 HOURS PRN
Qty: 30 TABLET | Refills: 3 | Status: ON HOLD | OUTPATIENT
Start: 2017-02-13 | End: 2017-03-04 | Stop reason: HOSPADM

## 2017-02-13 RX ORDER — HYDROMORPHONE HYDROCHLORIDE 2 MG/ML
INJECTION, SOLUTION INTRAMUSCULAR; INTRAVENOUS; SUBCUTANEOUS
Status: DISCONTINUED | OUTPATIENT
Start: 2017-02-13 | End: 2017-02-13

## 2017-02-13 RX ORDER — FENTANYL CITRATE 50 UG/ML
INJECTION, SOLUTION INTRAMUSCULAR; INTRAVENOUS
Status: DISCONTINUED | OUTPATIENT
Start: 2017-02-13 | End: 2017-02-13

## 2017-02-13 RX ORDER — METHOCARBAMOL 750 MG/1
750 TABLET, FILM COATED ORAL 3 TIMES DAILY PRN
Status: DISCONTINUED | OUTPATIENT
Start: 2017-02-13 | End: 2017-02-17 | Stop reason: HOSPADM

## 2017-02-13 RX ORDER — DEXAMETHASONE SODIUM PHOSPHATE 4 MG/ML
INJECTION, SOLUTION INTRA-ARTICULAR; INTRALESIONAL; INTRAMUSCULAR; INTRAVENOUS; SOFT TISSUE
Status: DISCONTINUED | OUTPATIENT
Start: 2017-02-13 | End: 2017-02-13

## 2017-02-13 RX ORDER — ONDANSETRON 2 MG/ML
4 INJECTION INTRAMUSCULAR; INTRAVENOUS ONCE AS NEEDED
Status: DISCONTINUED | OUTPATIENT
Start: 2017-02-13 | End: 2017-02-13 | Stop reason: HOSPADM

## 2017-02-13 RX ORDER — BACITRACIN 50000 [IU]/1
INJECTION, POWDER, FOR SOLUTION INTRAMUSCULAR
Status: DISCONTINUED | OUTPATIENT
Start: 2017-02-13 | End: 2017-02-13 | Stop reason: HOSPADM

## 2017-02-13 RX ORDER — HYDROMORPHONE HYDROCHLORIDE 1 MG/ML
INJECTION, SOLUTION INTRAMUSCULAR; INTRAVENOUS; SUBCUTANEOUS
Status: COMPLETED
Start: 2017-02-13 | End: 2017-02-13

## 2017-02-13 RX ORDER — DEXAMETHASONE SODIUM PHOSPHATE 4 MG/ML
10 INJECTION, SOLUTION INTRA-ARTICULAR; INTRALESIONAL; INTRAMUSCULAR; INTRAVENOUS; SOFT TISSUE EVERY 8 HOURS
Status: DISCONTINUED | OUTPATIENT
Start: 2017-02-13 | End: 2017-02-14

## 2017-02-13 RX ORDER — MAGNESIUM SULFATE HEPTAHYDRATE 40 MG/ML
2 INJECTION, SOLUTION INTRAVENOUS ONCE
Status: COMPLETED | OUTPATIENT
Start: 2017-02-13 | End: 2017-02-13

## 2017-02-13 RX ORDER — SUCCINYLCHOLINE CHLORIDE 20 MG/ML
INJECTION INTRAMUSCULAR; INTRAVENOUS
Status: DISCONTINUED | OUTPATIENT
Start: 2017-02-13 | End: 2017-02-13

## 2017-02-13 RX ORDER — CEFAZOLIN SODIUM 1 G/3ML
INJECTION, POWDER, FOR SOLUTION INTRAMUSCULAR; INTRAVENOUS
Status: DISCONTINUED | OUTPATIENT
Start: 2017-02-13 | End: 2017-02-13

## 2017-02-13 RX ORDER — LABETALOL HYDROCHLORIDE 5 MG/ML
10 INJECTION, SOLUTION INTRAVENOUS EVERY 4 HOURS PRN
Status: DISCONTINUED | OUTPATIENT
Start: 2017-02-13 | End: 2017-02-14

## 2017-02-13 RX ORDER — PROPOFOL 10 MG/ML
VIAL (ML) INTRAVENOUS CONTINUOUS PRN
Status: DISCONTINUED | OUTPATIENT
Start: 2017-02-13 | End: 2017-02-13

## 2017-02-13 RX ORDER — ROCURONIUM BROMIDE 10 MG/ML
INJECTION, SOLUTION INTRAVENOUS
Status: DISCONTINUED | OUTPATIENT
Start: 2017-02-13 | End: 2017-02-13

## 2017-02-13 RX ORDER — OXYCODONE HYDROCHLORIDE 5 MG/1
10 TABLET ORAL EVERY 4 HOURS PRN
Status: DISCONTINUED | OUTPATIENT
Start: 2017-02-13 | End: 2017-02-17 | Stop reason: HOSPADM

## 2017-02-13 RX ORDER — OXYCODONE HYDROCHLORIDE 5 MG/1
15 TABLET ORAL EVERY 4 HOURS PRN
Status: DISCONTINUED | OUTPATIENT
Start: 2017-02-13 | End: 2017-02-17 | Stop reason: HOSPADM

## 2017-02-13 RX ADMIN — SODIUM CHLORIDE 3 UNITS/HR: 9 INJECTION, SOLUTION INTRAVENOUS at 04:02

## 2017-02-13 RX ADMIN — HYDROMORPHONE HYDROCHLORIDE 0.2 MG: 1 INJECTION, SOLUTION INTRAMUSCULAR; INTRAVENOUS; SUBCUTANEOUS at 07:02

## 2017-02-13 RX ADMIN — OXYCODONE HYDROCHLORIDE 10 MG: 10 TABLET, FILM COATED, EXTENDED RELEASE ORAL at 08:02

## 2017-02-13 RX ADMIN — PROPOFOL 150 MCG/KG/MIN: 10 INJECTION, EMULSION INTRAVENOUS at 03:02

## 2017-02-13 RX ADMIN — AMLODIPINE BESYLATE 5 MG: 5 TABLET ORAL at 08:02

## 2017-02-13 RX ADMIN — DEXAMETHASONE 8 MG: 4 TABLET ORAL at 08:02

## 2017-02-13 RX ADMIN — HYDROMORPHONE HYDROCHLORIDE 0.4 MG: 2 INJECTION INTRAMUSCULAR; INTRAVENOUS; SUBCUTANEOUS at 06:02

## 2017-02-13 RX ADMIN — TAMSULOSIN HYDROCHLORIDE 0.4 MG: 0.4 CAPSULE ORAL at 08:02

## 2017-02-13 RX ADMIN — ROCURONIUM BROMIDE 5 MG: 10 INJECTION, SOLUTION INTRAVENOUS at 03:02

## 2017-02-13 RX ADMIN — ACETAMINOPHEN 1000 MG: 10 INJECTION, SOLUTION INTRAVENOUS at 10:02

## 2017-02-13 RX ADMIN — MIDAZOLAM HYDROCHLORIDE 2 MG: 1 INJECTION, SOLUTION INTRAMUSCULAR; INTRAVENOUS at 03:02

## 2017-02-13 RX ADMIN — HYDRALAZINE HYDROCHLORIDE 10 MG: 20 INJECTION INTRAMUSCULAR; INTRAVENOUS at 07:02

## 2017-02-13 RX ADMIN — STANDARDIZED SENNA CONCENTRATE AND DOCUSATE SODIUM 1 TABLET: 8.6; 5 TABLET, FILM COATED ORAL at 08:02

## 2017-02-13 RX ADMIN — DEXAMETHASONE SODIUM PHOSPHATE 10 MG: 4 INJECTION, SOLUTION INTRAMUSCULAR; INTRAVENOUS at 11:02

## 2017-02-13 RX ADMIN — HYDROMORPHONE HYDROCHLORIDE 0.2 MG: 2 INJECTION INTRAMUSCULAR; INTRAVENOUS; SUBCUTANEOUS at 06:02

## 2017-02-13 RX ADMIN — GABAPENTIN 300 MG: 300 CAPSULE ORAL at 10:02

## 2017-02-13 RX ADMIN — FENTANYL CITRATE 250 MCG: 50 INJECTION, SOLUTION INTRAMUSCULAR; INTRAVENOUS at 03:02

## 2017-02-13 RX ADMIN — HYDRALAZINE HYDROCHLORIDE 25 MG: 25 TABLET ORAL at 04:02

## 2017-02-13 RX ADMIN — REMIFENTANIL HYDROCHLORIDE 0.05 MCG/KG/MIN: 1 INJECTION, POWDER, LYOPHILIZED, FOR SOLUTION INTRAVENOUS at 03:02

## 2017-02-13 RX ADMIN — SUCCINYLCHOLINE CHLORIDE 120 MG: 20 INJECTION, SOLUTION INTRAMUSCULAR; INTRAVENOUS at 03:02

## 2017-02-13 RX ADMIN — CEFAZOLIN 2 G: 1 INJECTION, POWDER, FOR SOLUTION INTRAVENOUS at 03:02

## 2017-02-13 RX ADMIN — PROPOFOL 100 MG: 10 INJECTION, EMULSION INTRAVENOUS at 03:02

## 2017-02-13 RX ADMIN — LIDOCAINE HYDROCHLORIDE 100 MG: 20 INJECTION, SOLUTION INTRAVENOUS at 03:02

## 2017-02-13 RX ADMIN — DEXAMETHASONE SODIUM PHOSPHATE 8 MG: 4 INJECTION, SOLUTION INTRAMUSCULAR; INTRAVENOUS at 03:02

## 2017-02-13 RX ADMIN — SODIUM CHLORIDE, SODIUM LACTATE, POTASSIUM CHLORIDE, AND CALCIUM CHLORIDE: 600; 310; 30; 20 INJECTION, SOLUTION INTRAVENOUS at 03:02

## 2017-02-13 RX ADMIN — CARVEDILOL 25 MG: 25 TABLET, FILM COATED ORAL at 08:02

## 2017-02-13 RX ADMIN — CEFAZOLIN SODIUM 2 G: 2 SOLUTION INTRAVENOUS at 08:02

## 2017-02-13 RX ADMIN — SODIUM CHLORIDE: 0.9 INJECTION, SOLUTION INTRAVENOUS at 07:02

## 2017-02-13 RX ADMIN — LABETALOL HYDROCHLORIDE 10 MG: 5 INJECTION, SOLUTION INTRAVENOUS at 06:02

## 2017-02-13 RX ADMIN — MAGNESIUM SULFATE IN WATER 2 G: 40 INJECTION, SOLUTION INTRAVENOUS at 08:02

## 2017-02-13 RX ADMIN — OXYCODONE HYDROCHLORIDE 10 MG: 5 TABLET ORAL at 11:02

## 2017-02-13 RX ADMIN — SODIUM CHLORIDE: 0.9 INJECTION, SOLUTION INTRAVENOUS at 02:02

## 2017-02-13 RX ADMIN — KETAMINE HYDROCHLORIDE 30 MG: 100 INJECTION, SOLUTION, CONCENTRATE INTRAMUSCULAR; INTRAVENOUS at 03:02

## 2017-02-13 RX ADMIN — INSULIN DETEMIR 10 UNITS: 100 INJECTION, SOLUTION SUBCUTANEOUS at 08:02

## 2017-02-13 RX ADMIN — GABAPENTIN 300 MG: 300 CAPSULE ORAL at 01:02

## 2017-02-13 RX ADMIN — GABAPENTIN 300 MG: 300 CAPSULE ORAL at 06:02

## 2017-02-13 RX ADMIN — SODIUM CHLORIDE: 0.9 INJECTION, SOLUTION INTRAVENOUS at 03:02

## 2017-02-13 RX ADMIN — FAMOTIDINE 20 MG: 20 TABLET, FILM COATED ORAL at 08:02

## 2017-02-13 RX ADMIN — SODIUM CHLORIDE: 0.9 INJECTION, SOLUTION INTRAVENOUS at 06:02

## 2017-02-13 RX ADMIN — OXYCODONE HYDROCHLORIDE 10 MG: 5 TABLET ORAL at 08:02

## 2017-02-13 RX ADMIN — OXYCODONE HYDROCHLORIDE 10 MG: 5 TABLET ORAL at 02:02

## 2017-02-13 NOTE — PROGRESS NOTES
Dr Ng at bedside to clarify surgical site noted on consent, consent correct and patient ok to go to surgery.

## 2017-02-13 NOTE — PLAN OF CARE
Problem: Patient Care Overview  Goal: Plan of Care Review  Remained safe. Rested in bed throughout shift. Pt had removed cervical collar when I first entered the room this am. Replaced collar and instructed him that it must be worn at all times. Good appetite. Complained of back pain. Stated relief with oxycodone administration. Urine output low relative to input. Urine concentrated. Bladder non-distended. Repositioned to prevent pressure-related skin breakdown. Blood glucose elevated. levemir started. To be NPO after midnight for surgery. Fall precautions maintained and callbell and personal items kept within reach. No apparent distress.

## 2017-02-13 NOTE — PROGRESS NOTES
Progress Note  Hematology/Oncology    Patient Name: Rah Puente  YOB: 1955    Admit Date: 2/9/2017                     LOS: 4    SUBJECTIVE:     Reason for Admission:  Upper extremity weakness    Rah Puente is a 61 y.o. male with PMH of Stage I colon CA, s/p colectomy in March 2016, HTN, DM2, and BPH who presented to clinic for neck pain/weakness and UE weakness. Recent CT imaging and bone scan concerning for mets and patient admitted to inpatient for further workup. Patient states he first noted worsening UE weakness 2 weeks prior 1/27/17 which began in his right shoulder and progressed down his arm to his hand. The following week he noted similar symptoms of the left arm. Patient also noted worsening neck weakness and pain as he was unable to lift his neck. Of note, patient with worsening lower extremity weakness which he states first started 1 month prior and had worsening gait, s/p fall. Patient also endorses UE and LE neuropathy.     Patient did not have oncologic f/u after colectomy for Stage I colon cancer and was recently referred by ortho as patient with worsening neck/back pain and scans concerning for metastatic disease.      Interval history:   Patient remains hypertensive overnight.  /105 this AM. Plan for OR today for C5 corpectomy. C/o neck pain this AM but no worsening of weakness. Denies headaches, changes in vision.     OBJECTIVE:     Vital Signs Range (Last 24H):  Temp:  [97.5 °F (36.4 °C)-97.9 °F (36.6 °C)]   Pulse:  [64-76]   Resp:  [17-18]   BP: (124-215)/()   SpO2:  [95 %-98 %] Body mass index is 21.84 kg/(m^2).    I & O (Last 24H):    Intake/Output Summary (Last 24 hours) at 02/13/17 0708  Last data filed at 02/13/17 0500   Gross per 24 hour   Intake          1443.33 ml   Output             1375 ml   Net            68.33 ml       Physical Exam:  Constitutional: He is oriented to person, place, and time. No acute distress.   HEENT: Normocephalic,  linear scab approximately 5 cm in length, no active discharge. Uvula is midline. Conjunctivae and EOM are normal. Pupils are equal, round, and reactive to light. C-collar in place.  Cardiovascular: Normal rate, regular rhythm, normal heart sounds and normal pulses.   No swelling noted to bilateral lower extremities.   Pulmonary/Chest: Effort normal and breath sounds normal. No respiratory distress. He has no wheezes. He has no rales.   Abdominal: Soft. Normal appearance and bowel sounds are normal. He exhibits no distension. There is no tenderness. There is no rebound and no guarding.   Neurological: He is alert and oriented to person, place, and time.   Noticeably decreased hand and motor strength of upper extremitie. No pain with forced movement of extremities above shoulders.   Bilateral UE: 2/5 lifting arms, 4/5 flexion and extension.   Bilateral LE: 4/5 strength hip flexion and knee flexion/extension  Finger to nose in tact.   Skin: Skin is warm, dry and intact. He is not diaphoretic. Erythematous maculopapular rash worse at dorsum of bilateral hands but tracing up the forearms bilaterally    Psychiatric: He has a normal mood and affect. His speech is normal and behavior is normal.     Medications:  Scheduled:   carvedilol  25 mg Oral BID    dexamethasone  8 mg Oral Q12H    fentaNYL  1 patch Transdermal Q72H    gabapentin  300 mg Oral TID    insulin detemir  10 Units Subcutaneous Daily    magnesium sulfate IVPB  2 g Intravenous Once    senna-docusate 8.6-50 mg  1 tablet Oral Daily    tamsulosin  0.4 mg Oral BID       Infusions:   sodium chloride 0.9% 100 mL/hr at 02/13/17 0251       PRN:  dextrose 50%, dextrose 50%, dextrose 50%, glucagon (human recombinant), glucagon (human recombinant), glucose, glucose, hydrALAZINE, insulin aspart, ondansetron, oxycodone    Diagnostic Results:  Lab Results   Component Value Date    WBC 8.48 02/13/2017    HGB 10.4 (L) 02/13/2017    HCT 30.9 (L) 02/13/2017    MCV 86  "02/13/2017     02/13/2017       Recent Labs  Lab 02/13/17  0440   *   *   K 4.8   CL 90*   CO2 25   BUN 36*   CREATININE 1.7*   CALCIUM 8.9   MG 1.5*     Lab Results   Component Value Date    INR 1.0 02/09/2017    INR 1.0 05/18/2014    INR 1.0 05/04/2012     Lab Results   Component Value Date    HGBA1C 6.5 (H) 02/09/2017     Recent Labs      02/10/17   2305  02/11/17   0553  02/11/17   1228  02/11/17   1745  02/12/17   0049  02/12/17   0634  02/12/17   1128  02/12/17   1721   POCTGLUCOSE  289*  230*  289*  257*  235*  185*  277*  280*     2/9/17 MRI brain showing ossesous calvarial mets but no acute intracranial structures   2/9/17 C-spine, T-spine, L-spine; compression deformity of C5 with retropulsion of the vertebral body and severe canal stenosis. Extensive osseous and pulmonary mets.     ASSESSMENT/PLAN:     Active Hospital Problems    Diagnosis  POA    *Upper extremity weakness [R29.898]  Yes    Metastatic cancer to bone [C79.51]  Yes      Resolved Hospital Problems    Diagnosis Date Resolved POA   No resolved problems to display.       Mr. Puente is a 62 y/o man with hx of colon CA (originally Stage I T2, N0), HTN, DM2, and BPH who presented with worsening UE weakness and imaging concerning for found to have bone, brain and lung mets. ECOG 3.         Stage IV Metastatic CA, suspect recurrent colon cancer   - patient with intussusception s/p colectomy on 3/10/16, path report showing invasive well-differentiated adenocarcinoma extending into muscularis propria, T2, N0  - patient then lost to follow up as he was told it was completely resected  - presented to ortho for worsening neck/back pain. CT 1/21/17 revealed "multiple lytic lesions in the lower cervical/upper thoracic spine, and multiple lung nodules." Referred to Dr. Goetz for concern for metastatic disease   - CEA markedly elevated at 415   - PSA=0.33, wnl  - MRI unable to use contrast 2/2 AGUILAR   - MRI brain showing ossesous " calvarial mets but no acute intracranial structures   - C-spine, T-spine, L-spine; extensive metastatic disease throughout spine and pelvis, pathologic compression fracture deformity of C5 vertebral body and retropulsion of the posterior aspect of the C5 vertebral body     - patient previously following with Dr. Ng for neck and back pain. Planned for C5 corpectomy today  - cards cleared patient for surgery  - rad/onc consulted, appreciate assistance. Will plan for palliative radiotherapy after C5 corpectomy.   - retroperitoneal US consistent with chronic medical renal disease in addition to decreased perfusion of bilateral kidneys, distended bladder, and prostatomegaly; also showing likely liver mets   - continue decadron 8 mg bid   - oxy 10 mg q4h prn for pain and fentanyl patch 75 mcg/hr   - gabapentin 300 mg tid for neuropathic pain   - PT/OT     Essential HTN   - will hold home quinapril 20 mg qHS in setting of AGUILAR   - prn hydralazine 25 mg po, prn labetalol 10 mg iv   - per patient, SBP between 80s-110s at home  - as per cardiology, will hold home metoprolol 200 mg bid and start patient on 25 coreg BID  -Continues to be elevated this AM with SBP >180.   - will add norvasc 5 mg daily today and discuss with cards if they have further recommendations      DM2  - on metformin at home   - HgbA1c 6.5  - will start on SSI   - gabapentin 300 mg tid for neuropathic pain   - per patient, glucose controlled in 120s at home, elevated glucose likely 2/2 steroids   -on SSI  -continue detemir 10 U daily     BPH  - continue Flomax 0.4 mg bid     AGUILAR  - Cr 1.7 on 2/3, baseline around 1   - Cr on presentation 3.4, down to 1.7  today s/p IVF    - urine studies showing postobstructive picture   - urine U/S showing prostatomegaly and distended bladder in addition to chronic medical renal disease   - also concern could be 2/2 metastatic disease   - bladder scans q2h, will space to q6h as patient now voiding independently   -  NS @ 100   - patient deferred urology consult. States decreased UOP when he misses doses of his flomax and that he is improved  - bladder scan this AM with 1 L, then patient able to void 350 cc this AM. Will place nguyen this AM.     Hyponatremia  - likely hypovolemic hyponatremia  - continue IVF  - improving  - will monitor      DVT ppx: held for surgery today   Diet: Diabetic diet, NPO for surgery today   Code: Full     Staff attestation to follow.        Carmen Yoder MD, PGY1      Attending Addendum:  The patient was seen, examined, and discussed on rounds with the fellow.  I agree with the assessment and plan as outlined for Rah Puente.  Plans noted for surgery today.    Shawn Ruiz DO, FACP  Hematology & Oncology  [address] [place] [address]  ph. [phone]  Fax. [phone]

## 2017-02-13 NOTE — PT/OT/SLP PROGRESS
Occupational Therapy      Rah Puente  MRN: [id number]    Patient not seen today secondary to away for procedure. Per chart, pt away for Corpectomy.   Will follow up at later time as appropriate per POC.    ZORAIDA Villarreal  2/13/2017

## 2017-02-13 NOTE — PROGRESS NOTES
02/13/17 0750   Vital Signs   Temp 98.1 °F (36.7 °C)   Pulse 77   Heart Rate Source Monitor   Resp (!) 22   SpO2 98 %   BP (!) 214/91   Assessments (Pre/Post)   Level of Consciousness (AVPU) alert   Dr. Yoder notified of hypertension, will administer morning blood pressure medications and continue to monitor.

## 2017-02-13 NOTE — PROGRESS NOTES
Informed Dr. Montoya of patients blood pressure of 180/87 and blood sugar of 224, no new orders at this time.

## 2017-02-13 NOTE — PROGRESS NOTES
Ortho/Spine Progress Note    Postop day: DOS    ID: The patient is a 61 y.o. male status post: pending C5 / possible C6 corpectomy with anterior cervical fusion for myelopathy    Overnight Events: no acute events.  NPO    Vitals:    02/13/17 1036   BP: (!) 171/75   Pulse: 66   Resp:    Temp:        Drain Output  02/12 0701 - 02/13 0700  In: 1443.3   Out: 1375     Physical Exam:  NAD, A/O x 3.  Wound c/d/i with clean dressing.  No focal motor or sensory deficits noted.    Assessment: The patient is a 61 y.o. male status post: day of surgery for C5 possible C6 corpectomy for metastatic C5 burst fracture    Plan:  1) Antibiotics: none  2) Weight bearing status: wbat in c collar  3) Labs: H/H 10.4/30/9  4) DVT Prophylaxis: mechanical  5) Lines/Drains: PIV  6) Dispo: to surgery today.  2 units PRBC prepared    Abhijit Burnett MD  Orthopedic Surgery PGY-4  [phone] pager

## 2017-02-13 NOTE — PT/OT/SLP PROGRESS
Physical Therapy      Rah Puente  MRN: [id number]    Patient not seen today secondary to Patient unwilling to participate (Attempted multiple times to treat, at 10:28 am and 11:42 am, pt refused each time for multiple reasons). Will follow-up on the next scheduled visit date.    Dr. Lucia Quintanilla, PT, DPT, GCS  2/13/2017

## 2017-02-13 NOTE — PROGRESS NOTES
Patient states surgery will be done on C 2,3,4,and 5 however consents stated C4-7, MD notified and he will come to bedside and clarify consents.

## 2017-02-13 NOTE — ANESTHESIA PROCEDURE NOTES
Arterial    Diagnosis: cervical spine disease    Patient location during procedure: done in OR  Procedure start time: 2/13/2017 3:13 PM  Timeout: 2/13/2017 3:13 PM  Procedure end time: 2/13/2017 3:17 PM  Staffing  Anesthesiologist: BAKARI BOSE  Performed by: anesthesiologist   Anesthesiologist was present at the time of the procedure.  Preanesthetic Checklist  Completed: patient identified, site marked, surgical consent, pre-op evaluation, timeout performed, IV checked, risks and benefits discussed, monitors and equipment checked and anesthesia consent given  Arterial Line  Skin Prep: alcohol swabs  Local Infiltration: none  Orientation: left  Location: radial  Catheter Size: 20 G{OHS ANESTHESIA BLOCK ART PLACEMENTInsertion Attempts: 1  Assessment  Dressing: secured with tape and tegaderm  Patient: Tolerated well

## 2017-02-13 NOTE — PLAN OF CARE
Problem: Patient Care Overview  Goal: Plan of Care Review  Outcome: Ongoing (interventions implemented as appropriate)  Pt is resting in bed, remains on bedrest. Friends visited earlier in the shift. SBP elevated, administered Hydralazine 25 mg x 2 this shift. Pt is awake and expresses anxiety related to upcoming surgery this morning. NPO status maintained after midnight except for sips of water with meds. Pt complains of pain to posterior neck and back, medicated with oxy IR x 2 this shift with stated relief. Fentanyl 75 mcg patch remains in place on RUE. Bed is in lowest position, side rails up. Will continue to follow.

## 2017-02-14 ENCOUNTER — TELEPHONE (OUTPATIENT)
Dept: HEMATOLOGY/ONCOLOGY | Facility: CLINIC | Age: 62
End: 2017-02-14

## 2017-02-14 DIAGNOSIS — C79.51 METASTATIC CANCER TO BONE: Primary | ICD-10-CM

## 2017-02-14 PROBLEM — C78.00 SECONDARY MALIGNANT NEOPLASM OF LUNG: Status: ACTIVE | Noted: 2017-02-14

## 2017-02-14 PROBLEM — C77.2 MALIGNANT NEOPLASM METASTATIC TO INTRA-ABDOMINAL LYMPH NODE: Status: ACTIVE | Noted: 2017-02-14

## 2017-02-14 PROBLEM — C79.72 MALIGNANT NEOPLASM METASTATIC TO LEFT ADRENAL GLAND: Status: ACTIVE | Noted: 2017-02-14

## 2017-02-14 PROBLEM — C79.70 MALIGNANT NEOPLASM METASTATIC TO ADRENAL GLAND: Status: ACTIVE | Noted: 2017-02-14

## 2017-02-14 PROBLEM — N17.9 AKI (ACUTE KIDNEY INJURY): Status: ACTIVE | Noted: 2017-02-14

## 2017-02-14 PROBLEM — N40.0 BENIGN NON-NODULAR PROSTATIC HYPERPLASIA WITHOUT LOWER URINARY TRACT SYMPTOMS: Status: ACTIVE | Noted: 2017-02-14

## 2017-02-14 LAB
ALBUMIN SERPL BCP-MCNC: 2.5 G/DL
ALP SERPL-CCNC: 269 U/L
ALT SERPL W/O P-5'-P-CCNC: 22 U/L
ANION GAP SERPL CALC-SCNC: 10 MMOL/L
AST SERPL-CCNC: 36 U/L
BASOPHILS # BLD AUTO: 0 K/UL
BASOPHILS NFR BLD: 0 %
BILIRUB SERPL-MCNC: 0.4 MG/DL
BUN SERPL-MCNC: 33 MG/DL
CALCIUM SERPL-MCNC: 8.5 MG/DL
CHLORIDE SERPL-SCNC: 93 MMOL/L
CO2 SERPL-SCNC: 23 MMOL/L
CREAT SERPL-MCNC: 1.5 MG/DL
DIFFERENTIAL METHOD: ABNORMAL
EOSINOPHIL # BLD AUTO: 0 K/UL
EOSINOPHIL NFR BLD: 0 %
ERYTHROCYTE [DISTWIDTH] IN BLOOD BY AUTOMATED COUNT: 13.4 %
EST. GFR  (AFRICAN AMERICAN): 56.5 ML/MIN/1.73 M^2
EST. GFR  (NON AFRICAN AMERICAN): 48.8 ML/MIN/1.73 M^2
GLUCOSE SERPL-MCNC: 224 MG/DL
GLUCOSE SERPL-MCNC: 244 MG/DL (ref 70–110)
HCO3 UR-SCNC: 24.5 MMOL/L (ref 24–28)
HCT VFR BLD AUTO: 30.9 %
HCT VFR BLD CALC: 27 %PCV (ref 36–54)
HGB BLD-MCNC: 10.5 G/DL
LYMPHOCYTES # BLD AUTO: 0.5 K/UL
LYMPHOCYTES NFR BLD: 6.8 %
MAGNESIUM SERPL-MCNC: 1.5 MG/DL
MCH RBC QN AUTO: 28.5 PG
MCHC RBC AUTO-ENTMCNC: 34 %
MCV RBC AUTO: 84 FL
MONOCYTES # BLD AUTO: 0.5 K/UL
MONOCYTES NFR BLD: 5.9 %
NEUTROPHILS # BLD AUTO: 6.8 K/UL
NEUTROPHILS NFR BLD: 86.5 %
PCO2 BLDA: 37.7 MMHG (ref 35–45)
PH SMN: 7.42 [PH] (ref 7.35–7.45)
PHOSPHATE SERPL-MCNC: 3.8 MG/DL
PLATELET # BLD AUTO: 142 K/UL
PMV BLD AUTO: 9 FL
PO2 BLDA: 118 MMHG (ref 80–100)
POC BE: 0 MMOL/L
POC IONIZED CALCIUM: 1.14 MMOL/L (ref 1.06–1.42)
POC SATURATED O2: 99 % (ref 95–100)
POC TCO2: 26 MMOL/L (ref 23–27)
POCT GLUCOSE: 202 MG/DL (ref 70–110)
POCT GLUCOSE: 215 MG/DL (ref 70–110)
POCT GLUCOSE: 225 MG/DL (ref 70–110)
POCT GLUCOSE: 233 MG/DL (ref 70–110)
POCT GLUCOSE: 245 MG/DL (ref 70–110)
POCT GLUCOSE: 302 MG/DL (ref 70–110)
POTASSIUM BLD-SCNC: 4.2 MMOL/L (ref 3.5–5.1)
POTASSIUM SERPL-SCNC: 4.3 MMOL/L
PROT SERPL-MCNC: 6.1 G/DL
RBC # BLD AUTO: 3.68 M/UL
SAMPLE: ABNORMAL
SODIUM BLD-SCNC: 125 MMOL/L (ref 136–145)
SODIUM SERPL-SCNC: 126 MMOL/L
WBC # BLD AUTO: 7.82 K/UL

## 2017-02-14 PROCEDURE — 25000003 PHARM REV CODE 250: Performed by: STUDENT IN AN ORGANIZED HEALTH CARE EDUCATION/TRAINING PROGRAM

## 2017-02-14 PROCEDURE — 83735 ASSAY OF MAGNESIUM: CPT

## 2017-02-14 PROCEDURE — 63600175 PHARM REV CODE 636 W HCPCS: Performed by: STUDENT IN AN ORGANIZED HEALTH CARE EDUCATION/TRAINING PROGRAM

## 2017-02-14 PROCEDURE — 25000003 PHARM REV CODE 250: Performed by: ORTHOPAEDIC SURGERY

## 2017-02-14 PROCEDURE — 63600175 PHARM REV CODE 636 W HCPCS: Performed by: INTERNAL MEDICINE

## 2017-02-14 PROCEDURE — 25000003 PHARM REV CODE 250: Performed by: INTERNAL MEDICINE

## 2017-02-14 PROCEDURE — 97164 PT RE-EVAL EST PLAN CARE: CPT

## 2017-02-14 PROCEDURE — 85025 COMPLETE CBC W/AUTO DIFF WBC: CPT

## 2017-02-14 PROCEDURE — 20600001 HC STEP DOWN PRIVATE ROOM

## 2017-02-14 PROCEDURE — 84100 ASSAY OF PHOSPHORUS: CPT

## 2017-02-14 PROCEDURE — 97530 THERAPEUTIC ACTIVITIES: CPT

## 2017-02-14 PROCEDURE — 63600175 PHARM REV CODE 636 W HCPCS: Performed by: ORTHOPAEDIC SURGERY

## 2017-02-14 PROCEDURE — 99222 1ST HOSP IP/OBS MODERATE 55: CPT | Mod: ,,, | Performed by: INTERNAL MEDICINE

## 2017-02-14 PROCEDURE — 80053 COMPREHEN METABOLIC PANEL: CPT

## 2017-02-14 RX ORDER — LABETALOL HYDROCHLORIDE 5 MG/ML
20 INJECTION, SOLUTION INTRAVENOUS EVERY 4 HOURS PRN
Status: DISCONTINUED | OUTPATIENT
Start: 2017-02-14 | End: 2017-02-17 | Stop reason: HOSPADM

## 2017-02-14 RX ORDER — INSULIN ASPART 100 [IU]/ML
5 INJECTION, SOLUTION INTRAVENOUS; SUBCUTANEOUS
Status: DISCONTINUED | OUTPATIENT
Start: 2017-02-14 | End: 2017-02-15

## 2017-02-14 RX ORDER — AMLODIPINE BESYLATE 10 MG/1
10 TABLET ORAL DAILY
Status: DISCONTINUED | OUTPATIENT
Start: 2017-02-15 | End: 2017-02-17 | Stop reason: HOSPADM

## 2017-02-14 RX ORDER — AMLODIPINE BESYLATE 5 MG/1
5 TABLET ORAL ONCE
Status: COMPLETED | OUTPATIENT
Start: 2017-02-14 | End: 2017-02-14

## 2017-02-14 RX ADMIN — LABETALOL HYDROCHLORIDE 20 MG: 5 INJECTION, SOLUTION INTRAVENOUS at 08:02

## 2017-02-14 RX ADMIN — FAMOTIDINE 20 MG: 20 TABLET, FILM COATED ORAL at 08:02

## 2017-02-14 RX ADMIN — ACETAMINOPHEN 1000 MG: 10 INJECTION, SOLUTION INTRAVENOUS at 05:02

## 2017-02-14 RX ADMIN — CARVEDILOL 25 MG: 25 TABLET, FILM COATED ORAL at 08:02

## 2017-02-14 RX ADMIN — HYDROMORPHONE HYDROCHLORIDE 1 MG: 1 INJECTION, SOLUTION INTRAMUSCULAR; INTRAVENOUS; SUBCUTANEOUS at 12:02

## 2017-02-14 RX ADMIN — OXYCODONE HYDROCHLORIDE 10 MG: 10 TABLET, FILM COATED, EXTENDED RELEASE ORAL at 08:02

## 2017-02-14 RX ADMIN — AMLODIPINE BESYLATE 5 MG: 5 TABLET ORAL at 08:02

## 2017-02-14 RX ADMIN — SODIUM CHLORIDE: 0.9 INJECTION, SOLUTION INTRAVENOUS at 08:02

## 2017-02-14 RX ADMIN — DEXAMETHASONE 8 MG: 4 TABLET ORAL at 08:02

## 2017-02-14 RX ADMIN — INSULIN ASPART 4 UNITS: 100 INJECTION, SOLUTION INTRAVENOUS; SUBCUTANEOUS at 01:02

## 2017-02-14 RX ADMIN — GABAPENTIN 300 MG: 300 CAPSULE ORAL at 05:02

## 2017-02-14 RX ADMIN — INSULIN ASPART 2 UNITS: 100 INJECTION, SOLUTION INTRAVENOUS; SUBCUTANEOUS at 06:02

## 2017-02-14 RX ADMIN — GABAPENTIN 300 MG: 300 CAPSULE ORAL at 09:02

## 2017-02-14 RX ADMIN — INSULIN ASPART 5 UNITS: 100 INJECTION, SOLUTION INTRAVENOUS; SUBCUTANEOUS at 06:02

## 2017-02-14 RX ADMIN — GABAPENTIN 300 MG: 300 CAPSULE ORAL at 02:02

## 2017-02-14 RX ADMIN — LABETALOL HYDROCHLORIDE 10 MG: 5 INJECTION, SOLUTION INTRAVENOUS at 02:02

## 2017-02-14 RX ADMIN — CEFAZOLIN SODIUM 2 G: 2 SOLUTION INTRAVENOUS at 04:02

## 2017-02-14 RX ADMIN — INSULIN DETEMIR 5 UNITS: 100 INJECTION, SOLUTION SUBCUTANEOUS at 02:02

## 2017-02-14 RX ADMIN — FENTANYL TRANSDERMAL 1 PATCH: 75 PATCH, EXTENDED RELEASE TRANSDERMAL at 08:02

## 2017-02-14 RX ADMIN — OXYCODONE HYDROCHLORIDE 15 MG: 5 TABLET ORAL at 11:02

## 2017-02-14 RX ADMIN — HYDROMORPHONE HYDROCHLORIDE 1 MG: 1 INJECTION, SOLUTION INTRAMUSCULAR; INTRAVENOUS; SUBCUTANEOUS at 08:02

## 2017-02-14 RX ADMIN — STANDARDIZED SENNA CONCENTRATE AND DOCUSATE SODIUM 1 TABLET: 8.6; 5 TABLET, FILM COATED ORAL at 08:02

## 2017-02-14 RX ADMIN — AMLODIPINE BESYLATE 5 MG: 5 TABLET ORAL at 10:02

## 2017-02-14 RX ADMIN — HYDROMORPHONE HYDROCHLORIDE 1 MG: 1 INJECTION, SOLUTION INTRAMUSCULAR; INTRAVENOUS; SUBCUTANEOUS at 05:02

## 2017-02-14 RX ADMIN — TAMSULOSIN HYDROCHLORIDE 0.4 MG: 0.4 CAPSULE ORAL at 08:02

## 2017-02-14 RX ADMIN — INSULIN ASPART 1 UNITS: 100 INJECTION, SOLUTION INTRAVENOUS; SUBCUTANEOUS at 12:02

## 2017-02-14 RX ADMIN — INSULIN DETEMIR 10 UNITS: 100 INJECTION, SOLUTION SUBCUTANEOUS at 08:02

## 2017-02-14 RX ADMIN — OXYCODONE HYDROCHLORIDE 15 MG: 5 TABLET ORAL at 10:02

## 2017-02-14 RX ADMIN — OXYCODONE HYDROCHLORIDE 15 MG: 5 TABLET ORAL at 04:02

## 2017-02-14 RX ADMIN — DEXAMETHASONE SODIUM PHOSPHATE 10 MG: 4 INJECTION, SOLUTION INTRAMUSCULAR; INTRAVENOUS at 05:02

## 2017-02-14 RX ADMIN — CEFAZOLIN SODIUM 2 G: 2 SOLUTION INTRAVENOUS at 03:02

## 2017-02-14 RX ADMIN — LABETALOL HYDROCHLORIDE 20 MG: 5 INJECTION, SOLUTION INTRAVENOUS at 04:02

## 2017-02-14 NOTE — PLAN OF CARE
Problem: Patient Care Overview  Goal: Plan of Care Review  Outcome: Ongoing (interventions implemented as appropriate)  Plan of care reviewed with patient who verbalized understanding. Mildly confused at times, easily reoriented, possibly due to hyponatremia (MDs aware). Hypertensive all night even with IV labetalol (see notes). All other vitals stable on room air. IVF and IV abx infused overnight. Bonilla catheter in place, adequate amounts of clear yellow urine noted. Patient scooted from stretcher to bed upon arrival to floor, still very weak bilateral upper and lower extremities. Numbness and tingling in both hands. Some tingling in feet but no numbness. Pain moderately controlled on current regimen, no nausea, tolerating clears. SCDs/TEDs in place. C-collar in place, incision clean and intact, SUSIE drain to lateral neck with very scant output. HOB >30 degrees at all times.

## 2017-02-14 NOTE — PROGRESS NOTES
BP still elevated despite labetalol 10 mg. Called Dr. Benjamin and communicated to him that a one time dose of IV hydralazine seemed to work in PACU last night, he stated he did not feel comfortable ordering that as it can cause rebound hypertension. He increased dosage of labetalol to 20 mg. Will administer and continue to monitor closely.

## 2017-02-14 NOTE — PT/OT/SLP RE-EVAL
Physical Therapy  Re-evaluation    Rah Puente   MRN: [id number]   Admitting Diagnosis: Upper extremity weakness    PT Received On: 02/14/17  PT Start Time: 1144     PT Stop Time: 1208    PT Total Time (min): 24 min       Billable Minutes:  Re-eval 14 and Therapeutic Activity 10    Diagnosis: Upper extremity weakness  Pt underwent sx 2/13/17 for C5-6 corpectomy and C4-7 ACDF; new PT orders 2/13/17    Past Medical History   Diagnosis Date    Alcohol abuse     BPH (benign prostatic hyperplasia)     Diabetes mellitus     Hypertension     Hyponatremia       Past Surgical History   Procedure Laterality Date    Hernia repair     Referring physician: Lex  Date referred to PT: 2/13/17  General Precautions: Standard, fall  Orthopedic Precautions: N/A   Braces: Aspen collar (up with collar)       Do you have any cultural, spiritual, Spiritism conflicts, given your current situation?: none    Patient History:  Lives With: alone  Living Arrangements: house  Home Accessibility: stairs to enter home  Home Layout: Able to live on 1st floor  Number of Stairs to Enter Home: 5  Stair Railings at Home: outside, present at both sides  Transportation Available: car  Equipment Currently Used at Home: bedside commode, bath bench, walker, rolling  Previous Level of Function:  Ambulation Skills: independent (Prior to 2 months, before most recent falls)  Transfer Skills: independent  ADL Skills: independent  Work/Leisure Activity: independent    Subjective:  Communicated with nurse prior to session.  Pt states he is upset concerning a recent visitor but he is agreeable to work with therapy  Chief Complaint: pain in his L shoulder/neck    Pain Rating: 3/10   Location - Side: Left     Location: neck  Pain Addressed: Pre-medicate for activity, Reposition, Nurse notified  Pain Rating Post-Intervention: 5/10    Objective:   Patient found with: cervical collar, telemetry, SUSIE drain, peripheral IV     Cognitive Exam:  Oriented to:  Person, Place, Time and Situation    Follows Commands/attention: Follows two-step commands  Communication: clear/fluent  Safety awareness/insight to disability: intact    Physical Exam:  Postural examination/scapula alignment: Rounded shoulder, Head forward and Abnormal trunk flexion    Sensation:   Intact  light/touch B UE and above his ankles B LE and Impaired  light/touch below ankles B LE (neuropathy per pt.)    Upper Extremity Range of Motion:  Right Upper Extremity: WFL AAROM  Left Upper Extremity: WFL  AAROM  Upper Extremity Strength:  Right Upper Extremity: Deficits: sh flex 2+/5; elbow flex 3-/5; elbow ext 3/5; wrist flex/ext 4-/5  Left Upper Extremity: Deficits: sh flex 2+/5; elbow flex 2+/5; elbow ext 3/5; wrist flex/ext 4-/5    Lower Extremity Range of Motion:  Right Lower Extremity: WFL  Left Lower Extremity: WFL    Lower Extremity Strength:  Right Lower Extremity: Deficits: hip flex 3/5; knee ext 4/5;knee flex 4-/5;ankle DF 3-/5  Left Lower Extremity: Deficits: hip flex 3+/5; knee ext 4+/5;knee flex 4/5; ankle DF 4/5     Functional Mobility:  Bed Mobility:  Rolling/Turning Right: Minimum assistance, With side rail  Supine to Sit: Minimum Assistance, With side rail  Sit to Supine: Moderate Assistance, With siderail    Transfers:  Sit <> Stand Assistance: Minimum Assistance (2 trials from the bed)  Sit <> Stand Assistive Device: Rolling Walker    Gait:   Gait Distance: 5 steps forward/5 back and 2 sidesteps with Rw with flexed trunk, decreased step length, and requires manual cues to direct the walker.  Assistance 1: Minimum assistance  Gait Assistive Device: Rolling walker  Gait Deviation(s): decreased davion, decreased step length, foot flat, forward lean    Balance:   Static Sit: FAIR+: Able to take MINIMAL challenges from all directions  Dynamic Sit: FAIR+: Maintains balance through MINIMAL excursions of active trunk motion  Static Stand: FAIR: Maintains without assist but unable to take  challenges  Therapeutic Activities and Exercises:  Pt sat on the EOB ~ 18 min between standing trials with supervision, pt stood x 2 trials with RW in front with CGA for ~ 3 min then 1 min to be cleaned due to BM, nurse present to assist    AM-PAC 6 CLICK MOBILITY  How much help from another person does this patient currently need?   1 = Unable, Total/Dependent Assistance  2 = A lot, Maximum/Moderate Assistance  3 = A little, Minimum/Contact Guard/Supervision  4 = None, Modified [place]/Independent    Turning over in bed (including adjusting bedclothes, sheets and blankets)?: 3  Sitting down on and standing up from a chair with arms (e.g., wheelchair, bedside commode, etc.): 3  Moving from lying on back to sitting on the side of the bed?: 3  Moving to and from a bed to a chair (including a wheelchair)?: 3  Need to walk in hospital room?: 3  Climbing 3-5 steps with a railing?: 2  Total Score: 17     AM-PAC Raw Score CMS G-Code Modifier Level of Impairment Assistance   6 % Total / Unable   7 - 9 CM 80 - 100% Maximal Assist   10 - 14 CL 60 - 80% Moderate Assist   15 - 19 CK 40 - 60% Moderate Assist   20 - 22 CJ 20 - 40% Minimal Assist   23 CI 1-20% SBA / CGA   24 CH 0% Independent/ Mod I     Patient left supine with all lines intact, call button in reach and nurse notified.    Assessment:   Rah Puente is a 61 y.o. male with a medical diagnosis of Upper extremity weakness and presents with difficulty with functional mobility due to weakness, instability, pain, and fatigue. Pt can benefit from continued therapy to work towards improved mobility.    Rehab identified problem list/impairments: Rehab identified problem list/impairments: weakness, impaired endurance, impaired functional mobilty, gait instability, decreased upper extremity function, decreased lower extremity function, impaired balance, decreased coordination, pain, decreased ROM, impaired skin, impaired fine motor    Rehab potential is  good.    Activity tolerance: Good    Discharge recommendations: Discharge Facility/Level Of Care Needs: nursing facility, skilled (SNF with possible progression to rehab)     Barriers to discharge: Barriers to Discharge: Inaccessible home environment, Decreased caregiver support (lives alone with 5 JULIA)    Equipment recommendations: Equipment Needed After Discharge: wheelchair     GOALS:   Physical Therapy Goals        Problem: Physical Therapy Goal    Goal Priority Disciplines Outcome Goal Variances Interventions   Physical Therapy Goal     PT/OT, PT Ongoing (interventions implemented as appropriate)     Description:  Goals to be met by: 17     Patient will increase functional independence with mobility by performin. Supine to sit with CGA-not met  2. Sit to supine with CGA-not met  3. Sit to stand transfer with RW with CGA-not met  4. Bed to chair transfer with CGA using Rolling Walker-not met  5. Gait  x 200 feet with Supervision using Rolling Walker-not met.   6. Ascend/descend 5 stair with bilateral Handrails minimal assist-not met.   7. Lower extremity exercise program x 20 reps per handout, with independence-not met.             PLAN:    Patient to be seen 5 x/week to address the above listed problems via gait training, therapeutic activities, therapeutic exercises, neuromuscular re-education  Plan of Care expires: 17  Plan of Care reviewed with: patient  Josephine ARTHUR Hinkle, PT  2017

## 2017-02-14 NOTE — PROGRESS NOTES
Allowed to assess sacral area            02/14/17 1605   Coping/Psychosocial   Plan Of Care Reviewed With patient       Pressure Ulcer 02/14/17 1635 medial sacral spine suspected deep tissue injury   Date First Assessed/Time First Assessed: 02/14/17 1635   Pressure Ulcer Present on Admission: no  Orientation: medial  Location: sacral spine  Staging: suspected deep tissue injury  Wound Length (cm): 3  Wound Width (cm): 2  Depth (cm): (c)    Staging suspected deep tissue injury   Healing Pressure Ulcer no   Pressure Ulcer Risk Factors activity;mobility;nutrition;shear/friction;moisture   Dressing Appearance no dressing   Drainage Amount none   Appearance purple   Periwound Area redness   Wound Edges intact   Wound Length (cm) 3   Wound Width (cm) 2   Depth (cm) (adjacent 2x1cm DTI)   Cleansed W/ soap and water   Interventions barrier applied  (critic aid paste)     Recommendations:  1. Apply critic aid paste to sacral area BID and prn any incontinence  2. Low air loss mattress overlay  3. frequent turning schedule  Tab Alonso RN,CWON  [id number]

## 2017-02-14 NOTE — PROGRESS NOTES
pts sister updated & notified of new room assignment & 8th floor  aware that pt is not returning & sister will come get belongings.

## 2017-02-14 NOTE — BRIEF OP NOTE
Preop Dx: C5 pathologic fracture    Cervical myelopathy    Postop Dx: same    Procedure: C5-C6 corpectomy    C4-7 Anterior Cervical Decompression and Fusion    Surgeon: Franklyn Ng M.D.    Feng Casarez M.D. Co-surgeon    Asst:  Abhijit Burnett M.D    Anesthesia: General endotracheal    EBL:  25cc    Implants: Depuy    Specimens: C5 vertebral body for pathology and culture    Findings: Soft, pathologic bone. C6 body soft and lytic.    Dispo:  To PACU stable.  Head of bed elevated all times at least 30 degrees.  Clear liquid diet.  No NSAIDs or anticoagulants.  No PCA.    Abhijit Burnett MD  Orthopedic Surgery PGY-4  [phone] pager

## 2017-02-14 NOTE — ASSESSMENT & PLAN NOTE
--Initially 122 on admission. Believed to be 2/2 hypovolemic hyponatremia. Pt started on fluids  --126 today  --Will hold fluids today and trend closely.

## 2017-02-14 NOTE — SUBJECTIVE & OBJECTIVE
Past Medical History   Diagnosis Date    Alcohol abuse     BPH (benign prostatic hyperplasia)     Diabetes mellitus     Hypertension     Hyponatremia        Past Surgical History   Procedure Laterality Date    Hernia repair         Review of patient's allergies indicates:  No Known Allergies    No current facility-administered medications on file prior to encounter.      Current Outpatient Prescriptions on File Prior to Encounter   Medication Sig    fentaNYL (DURAGESIC) 50 mcg/hr Place 1 patch onto the skin every 72 hours.    gabapentin (NEURONTIN) 300 MG capsule Take 300 mg by mouth 2 (two) times daily.    hydrocodone-acetaminophen 10-325mg (NORCO)  mg Tab Take 1 tablet by mouth every 4 (four) hours as needed for Pain.    metformin (GLUCOPHAGE) 500 MG tablet Take 500 mg by mouth 2 (two) times daily with meals.    metoprolol succinate (TOPROL-XL) 200 MG 24 hr tablet Take 200 mg by mouth 2 (two) times daily.    quinapril (ACCUPRIL) 20 MG tablet Take 20 mg by mouth every evening.    tamsulosin (FLOMAX) 0.4 mg Cp24 Take 0.4 mg by mouth once daily.     Family History     None        Social History Main Topics    Smoking status: Former Smoker     Packs/day: 1.00     Types: Cigarettes     Quit date: 12/7/2014    Smokeless tobacco: Not on file    Alcohol use 2.4 oz/week     4 Shots of liquor per week      Comment: vodka-nightly    Drug use: No    Sexual activity: Not on file     Review of Systems   Constitutional: Negative for chills and fever.   Eyes: Negative for photophobia and visual disturbance.   Respiratory: Negative for cough, shortness of breath and wheezing.    Cardiovascular: Negative for chest pain, palpitations and leg swelling.   Gastrointestinal: Negative for abdominal pain, constipation, diarrhea, nausea and vomiting.   Genitourinary: Negative for difficulty urinating and dysuria.   Musculoskeletal: Positive for neck pain.   Skin: Negative for pallor and rash.   Neurological:  Positive for weakness (upper extremity). Negative for dizziness, numbness and headaches.   Hematological: Negative for adenopathy. Does not bruise/bleed easily.   Psychiatric/Behavioral: Negative for dysphoric mood. The patient is not nervous/anxious.      Objective:     Vital Signs (Most Recent):  Temp: 96.8 °F (36 °C) (02/14/17 1354)  Pulse: 80 (02/14/17 1354)  Resp: 18 (02/14/17 1354)  BP: (!) 171/84 (02/14/17 1354)  SpO2: 95 % (02/14/17 1354) Vital Signs (24h Range):  Temp:  [96.8 °F (36 °C)-97.9 °F (36.6 °C)] 96.8 °F (36 °C)  Pulse:  [69-89] 80  Resp:  [16-20] 18  SpO2:  [94 %-100 %] 95 %  BP: (136-212)/() 171/84  Arterial Line BP: (159-204)/(66-86) 173/74     Weight: 77.2 kg (170 lb 3.1 oz)  Body mass index is 21.84 kg/(m^2).    Physical Exam   Constitutional: He is oriented to person, place, and time. He appears well-developed and well-nourished. No distress.   HENT:   Head: Normocephalic.   Right Ear: External ear normal.   Left Ear: External ear normal.        Eyes: EOM are normal. Pupils are equal, round, and reactive to light.   Neck: Normal range of motion. Neck supple.   C-collar in place   Cardiovascular: Normal rate, regular rhythm, normal heart sounds and intact distal pulses.  Exam reveals no gallop and no friction rub.    No murmur heard.  Pulmonary/Chest: Effort normal and breath sounds normal. No respiratory distress. He has no wheezes. He has no rales.   Abdominal: Soft. Bowel sounds are normal. He exhibits no distension. There is no tenderness.   Musculoskeletal: He exhibits no edema or deformity.   Lymphadenopathy:     He has no cervical adenopathy.   Neurological: He is alert and oriented to person, place, and time.   Skin: Skin is warm and dry.   Psychiatric: He has a normal mood and affect. Thought content normal.       Significant Labs:   CBC:   Recent Labs  Lab 02/13/17  0440 02/13/17  1608 02/13/17  1859 02/14/17  0533   WBC 8.48  --  6.27 7.82   HGB 10.4*  --  9.4* 10.5*   HCT  30.9* 27* 27.2* 30.9*     --  138* 142*     CMP:   Recent Labs  Lab 02/13/17  0440 02/13/17  1859 02/14/17  0533   * 126* 126*   K 4.8 4.1 4.3   CL 90* 95 93*   CO2 25 22* 23   * 201* 224*   BUN 36* 34* 33*   CREATININE 1.7* 1.5* 1.5*   CALCIUM 8.9 8.1* 8.5*   PROT 6.5 5.8* 6.1   ALBUMIN 2.7* 2.3* 2.5*   BILITOT 0.4 0.3 0.4   ALKPHOS 290* 250* 269*   AST 42* 40 36   ALT 24 23 22   ANIONGAP 11 9 10   EGFRNONAA 42.0* 48.8* 48.8*       Significant Imaging: I have reviewed all pertinent imaging results/findings within the past 24 hours.

## 2017-02-14 NOTE — TELEPHONE ENCOUNTER
----- Message from Monique Mckeon RN sent at 2/14/2017  7:42 AM CST -----  Contact: Justina  This patient had surgery yesterday and will need follow up accordingly with Dr. Goetz....Thanks!

## 2017-02-14 NOTE — PROGRESS NOTES
Spoke with Dr. Burnett with ortho. Ortho will take over as primary team today. Heme/Onc will arrange follow up with Dr. Goetz as outpatient when appropriate as patient s/p C5-C6 corpectomy with C4-C7 anterior cervical fusion.     Carmen Yoder MD, PGY1

## 2017-02-14 NOTE — ANESTHESIA RELEASE NOTE
"Anesthesia Release from PACU Note    Patient: Rah Puente    Procedure(s) Performed: Procedure(s) (LRB):  CORPECTOMY - C5/C6  Fusion C4/C7 (N/A)    Anesthesia type: general    Post pain: Adequate analgesia    Post assessment: no apparent anesthetic complications    Last Vitals:   Visit Vitals    BP (!) 166/83    Pulse 78    Temp 36.4 °C (97.5 °F) (Axillary)    Resp 18    Ht 6' 2.02" (1.88 m)    Wt 77.2 kg (170 lb 3.1 oz)    SpO2 97%    BMI 21.84 kg/m2       Post vital signs: stable    Level of consciousness: awake, alert  and oriented    Nausea/Vomiting: no nausea/no vomiting    Complications: none    Airway Patency: patent    Respiratory: unassisted, spontaneous ventilation, room air    Cardiovascular: stable and blood pressure at baseline    Hydration: euvolemic  "

## 2017-02-14 NOTE — ANESTHESIA POSTPROCEDURE EVALUATION
"Anesthesia Post Evaluation    Patient: Rah Puente    Procedure(s) Performed: Procedure(s) (LRB):  CORPECTOMY - C5/C6  Fusion C4/C7 (N/A)    Final Anesthesia Type: general  Patient location during evaluation: PACU  Patient participation: Yes- Able to Participate  Level of consciousness: awake and alert and oriented  Post-procedure vital signs: reviewed and stable  Pain management: adequate  Airway patency: patent  PONV status at discharge: No PONV  Anesthetic complications: no      Cardiovascular status: blood pressure returned to baseline and hemodynamically stable  Respiratory status: spontaneous ventilation, unassisted and room air  Hydration status: euvolemic  Follow-up not needed.        Visit Vitals    BP (!) 166/83    Pulse 78    Temp 36.4 °C (97.5 °F) (Axillary)    Resp 18    Ht 6' 2.02" (1.88 m)    Wt 77.2 kg (170 lb 3.1 oz)    SpO2 97%    BMI 21.84 kg/m2       Pain/Joe Score: Pain Assessment Performed: Yes (2/13/2017  7:45 PM)  Presence of Pain: complains of pain/discomfort (2/13/2017  7:45 PM)  Pain Rating Prior to Med Admin: 5 (2/13/2017  8:16 PM)  Pain Rating Post Med Admin: 0 (2/13/2017  9:01 AM)  Joe Score: 10 (2/13/2017  7:45 PM)      "

## 2017-02-14 NOTE — TRANSFER OF CARE
"Anesthesia Transfer of Care Note    Patient: Rah Puente    Procedure(s) Performed: Procedure(s) (LRB):  CORPECTOMY - C5/C6  Fusion C4/C7 (N/A)    Patient location: PACU    Anesthesia Type: general    Transport from OR: Transported from OR on 6-10 L/min O2 by face mask with adequate spontaneous ventilation    Post pain: adequate analgesia    Post assessment: no apparent anesthetic complications    Post vital signs: stable    Level of consciousness: awake, alert and oriented    Nausea/Vomiting: no nausea/vomiting    Complications: none          Last vitals:   Visit Vitals    BP (!) 197/94 (BP Location: Right arm, Patient Position: Lying, BP Method: Automatic)    Pulse 89    Temp 36.3 °C (97.3 °F) (Axillary)    Resp 18    Ht 6' 2.02" (1.88 m)    Wt 77.2 kg (170 lb 3.1 oz)    SpO2 100%    BMI 21.84 kg/m2     "

## 2017-02-14 NOTE — ASSESSMENT & PLAN NOTE
--Baseline Cr 1.0  --1.5 today  --Peaked at 3.4  --Avoid nephrotoxic agents  --Renally dose medications  --Monitor I/O

## 2017-02-14 NOTE — PLAN OF CARE
Problem: Physical Therapy Goal  Goal: Physical Therapy Goal  Goals to be met by: 17     Patient will increase functional independence with mobility by performin. Supine to sit with CGA-not met  2. Sit to supine with CGA-not met  3. Sit to stand transfer with RW with CGA-not met  4. Bed to chair transfer with CGA using Rolling Walker-not met  5. Gait x 200 feet with Supervision using Rolling Walker-not met.   6. Ascend/descend 5 stair with bilateral Handrails minimal assist-not met.   7. Lower extremity exercise program x 20 reps per handout, with independence-not met.   Outcome: Ongoing (interventions implemented as appropriate)  Pt's goals set and pt will benefit from skilled PT services to work towards improved functional mobility including: bed mobility, transfers, up/down steps,  and gait. Josephine Hinkle PT 17

## 2017-02-14 NOTE — CONSULTS
"[facility]  Critical Care Medicine  Consult Note    Patient Name: Rah Puente  MRN: [id number]  Admission Date: 2/9/2017  Hospital Length of Stay: 5 days  Code Status: Full Code  Attending Provider: Franklyn Ng MD  Primary Care Provider: Brooks Gilbert MD   Principal Problem: Upper extremity weakness    Subjective:     Reason for Consult: "Hypertensive Urgency"     Primary Team: Orthopedic Surgery    HPI: Rah Puente is a 61 y.o. male with current medical conditions including metastatic colon cancer s/p colectomy in March 2016 (initially stage 1, resected, but recently found to have suspected metastatic disease to lung and spine) HTN, DM2, and BPH who presented to clinic for neck pain/weakness and UE weakness. Recent CT imaging consistent with C5 pathologic fracture, and he is now s/p C5-C6 corpectomy and C4-C7 fusion done 2/13/17 by orthopedic surgery. Critical care medicine has been consulted for management of pt's hypertension. On chart review, pt's blood pressures have been consistently elevated since 2/10/17 with recorded blood pressures 180s and 190s systolic. On 2/11 highest blood pressure recorded 203/97, 2/13 reading of 215 systolic, and overnight 212/107 on 2/14. His scheduled medications include amlodipine 5 mg (started 2/13, appears to be changed in chart today to 10 mg but hadn't received this dose) and carvedilol 25 BID. Home ACE-I being held secondary to AGUILAR. PRN blood pressure medications given overnight included 10 IV hydralazine 7:30 pm, labetalol 10 mg IV at 2 am, and 20 mg IV labetalol given at 4:55 am and 8:57 am.     At time of exam, patient was AAO x 3, and denied CP/SOB. No headaches or vision changes. No dizziness or lightheadedness. BP checked manually was 165/80, and repeat blood pressure was the same. Of note, patient does endorse 6/10 neck pain, so does not appear to have optimal pain control at this time.     Review of Systems "   Constitutional: Negative for chills and fever.   HENT: Negative.    Eyes: Negative for photophobia and visual disturbance.   Respiratory: Negative for cough, chest tightness, shortness of breath and wheezing.    Cardiovascular: Negative for chest pain, palpitations and leg swelling.   Gastrointestinal: Positive for constipation. Negative for abdominal distention, abdominal pain, anal bleeding, blood in stool, diarrhea, nausea, rectal pain and vomiting.        Last bowel movement Thursday   Endocrine: Negative.    Genitourinary: Negative for dysuria and hematuria.   Musculoskeletal: Positive for neck pain.        Neck pain secondary to recent surgery    Skin: Positive for wound.        Surgical wound   Neurological: Negative for dizziness and light-headedness.   Hematological: Negative.    Psychiatric/Behavioral: Negative.      Objective:     Vital Signs (Most Recent):  Temp: 97.7 °F (36.5 °C) (02/14/17 0813)  Pulse: 77 (02/14/17 1100)  Resp: 18 (02/14/17 0813)  BP: Checked manually 1:00 pm - 165/80  SpO2: 95 % (02/14/17 0813) Vital Signs (24h Range):  Temp:  [97.2 °F (36.2 °C)-98.2 °F (36.8 °C)] 97.7 °F (36.5 °C)  Pulse:  [66-89] 77  Resp:  [16-20] 18  SpO2:  [94 %-100 %] 95 %  BP: (136-212)/() 178/90  Arterial Line BP: (159-204)/(66-86) 173/74     Weight: 77.2 kg (170 lb 3.1 oz)  Body mass index is 21.84 kg/(m^2).      Intake/Output Summary (Last 24 hours) at 02/14/17 1134  Last data filed at 02/14/17 1029   Gross per 24 hour   Intake          4473.33 ml   Output             5025 ml   Net          -551.67 ml     Physical Exam   Constitutional: He is oriented to person, place, and time. He appears well-developed and well-nourished. No distress.   Sitting up comfortably in bed, on room air. C-spine collar in place.    HENT:   Head: Normocephalic and atraumatic.   Eyes: Conjunctivae and EOM are normal. Pupils are equal, round, and reactive to light.   3 mm, both reactive    Neck:   Not assessed, in C-spine  collar. Drain with < 25 cc serosanguinous output.    Cardiovascular: Normal rate and regular rhythm.    Murmur heard.  Trace pedal edema. Longstanding murmur per pt.    Pulmonary/Chest: Effort normal and breath sounds normal. No respiratory distress. He has no wheezes. He has no rales. He exhibits no tenderness.   Abdominal: Soft. Bowel sounds are normal. He exhibits no distension and no mass. There is no tenderness. There is no rebound and no guarding. No hernia.   Midline abdominal incision, well healed.    Neurological: He is alert and oriented to person, place, and time. No cranial nerve deficit.   GCS 15. 4/5 power bilateral upper and lower extremities.    Skin: Skin is warm and dry.   Psychiatric: He has a normal mood and affect. His behavior is normal. Judgment and thought content normal.     Vents:      Room air      Lines/Drains/Airways     Drain                 Urethral Catheter 02/13/17 1058 1 day         Drain/Device  02/13/17 1743 Right anterior neck other (see comments) less than 1 day          Peripheral Intravenous Line                 Peripheral IV - Single Lumen 02/10/17 0905 Left Wrist 4 days         Peripheral IV - Single Lumen 02/13/17 1515 Right Hand less than 1 day              Significant Labs:    CBC/Anemia Profile:    Recent Labs  Lab 02/13/17  0440 02/13/17  1608 02/13/17  1859 02/14/17  0533   WBC 8.48  --  6.27 7.82   HGB 10.4*  --  9.4* 10.5*   HCT 30.9* 27* 27.2* 30.9*     --  138* 142*   MCV 86  --  85 84   RDW 13.5  --  13.3 13.4     Chemistries:    Recent Labs  Lab 02/13/17  0440 02/13/17  1859 02/14/17  0533   * 126* 126*   K 4.8 4.1 4.3   CL 90* 95 93*   CO2 25 22* 23   BUN 36* 34* 33*   CREATININE 1.7* 1.5* 1.5*   CALCIUM 8.9 8.1* 8.5*   ALBUMIN 2.7* 2.3* 2.5*   PROT 6.5 5.8* 6.1   BILITOT 0.4 0.3 0.4   ALKPHOS 290* 250* 269*   ALT 24 23 22   AST 42* 40 36   MG 1.5*  --  1.5*   PHOS 2.8  --  3.8       Significant Imaging:    CXR 2/10/17        EKG:  reviewed    Assessment/Plan:     Active Diagnoses:    Diagnosis Date Noted POA    PRINCIPAL PROBLEM:  Upper extremity weakness [R29.898] 02/09/2017 Yes    Metastatic cancer to bone [C79.51] 02/10/2017 Yes    Hypertension [I10] 05/19/2014 Yes      Problems Resolved During this Admission:    Diagnosis Date Noted Date Resolved POA     Assessment: Pt is a 62 yo male with presumed metastatic colon cancer now s/p cervical fusion for pathological fracture; critical care medicine is consulted for further management of patient's hypertension. At time of exam, patient denies symptoms of CP/SOB. No headaches, dizziness, lightheadedness, or vision changes.      Plan:   - At time of exam, patient's blood pressure was 165/80, checked manually twice.   - Recommend stopping IV fluid if patient able to tolerate PO intake and fluids. These were given for post-obstructive AGUILAR per heme/onc notes, and kidney function has improved over course of hospital stay. He has ordered lunch and denies nausea/vomiting.  - Discussed blood pressure post-operative recommendations with orthopedic surgery resident; they would like < 180 systolic to minimize likelihood of surgical site bleeding   - Patient's pain level at time of exam is 6/10; it is possible this is contributing to hypertension so recommend addressing this  - Recommend optimizing patient's PO medications rather than starting IV infusion at this time  - Continue amlodipine 10 mg (recieved 5 mg yesterday) and carvedilol 25 mg BID  - Can also start hydralazine 10 mg TID and titrate up as needed  - Continue PRN IV labetalol for systolic < 180    - Recommend repeat EKG (last done 2/10) and 2D echo (none on file)  - Hyponatremia noted on BMP appears to be chronic, dating back to 2010  - Agree with medicine co-management consult for further control of this patient's blood pressure  - At this time, we feel this patient is stable for the floor.   - However, should patient's blood pressure  continue to be unmanaged on the floor, we are happy to accept this patient for nicardipine infusion or similar medication.     Discussed and examined patient with Pulm/Critical Care Fellow.     Snehal Andrade MD  Critical Care Medicine  [facility]

## 2017-02-14 NOTE — NURSING TRANSFER
Nursing Transfer Note      2/13/2017     Transfer To: [facility]    Transfer via stretcher    Transfer with IV Pole    Transported by PACU Tech    Medicines sent: none    Chart send with patient: Yes    Notified: sister aware of new room assignment    Patient reassessed at: 2/13/17@2045     Upon arrival to floor: bed in lowest position

## 2017-02-14 NOTE — PLAN OF CARE
Problem: Patient Care Overview  Goal: Plan of Care Review  Outcome: Ongoing (interventions implemented as appropriate)  Plan of care reviewed with patient who verbalized understanding. Mildly confused at times, easily reoriented, MD aware. Pt's SBP this AM >200. Currently SBP is in the 170s after labetalol and morning BP meds given. All other vitals stable on room air. Bonilla catheter in place, adequate amounts of clear yellow urine noted. Pt took a few steps with PT this shift. Pt being turned Q2 due to sore on sacrum. Numbness and tingling in both hands. Some tingling in feet but no numbness. Pain managed with PRN pain medication. Pt only ate a small amount of light/soft diet, pt denies nausea. Insulin given per MAR. SCDs in place. C-collar in place, incision clean and intact, SUSIE drain to lateral neck with very scant output.

## 2017-02-14 NOTE — CONSULTS
[facility] Hospital Medicine  Consult Note    Patient Name: Rah Puente  MRN: [id number]  Admission Date: 2/9/2017  Hospital Length of Stay: 5 days  Attending Physician: Franklyn Ng MD   Primary Care Provider: Brooks Gilbert MD     [facility] Medicine Team: Networked reference to record PCT  Mariya Burch MD      Patient information was obtained from patient and past medical records.     Inpatient consult to Hospital Medicine-Ortho Comanagement Only  Consult performed by: MARIYA BURCH  Consult ordered by: JEVON HUNT  Reason for consult: co-management        Subjective:     Principal Problem: Upper extremity weakness    Chief Complaint: No chief complaint on file.       HPI: 61 year old male with PMHx T2DM, HTN, HLD, stage I colon Ca who prsented to hospital with neck pain and UE weakness. CT imaging was found to be concerning for mets. Pt was initially admitted to the hem/onc service, but is now on the orthopedic service s/p C5-6 corpectomy with C4-7 anterior cervical fusion. Medicine was consulted for co-management of his medical issues. This afternoon, pt is not reporting much pain. He denies any HA, chest pain, vision changes, shortness of breath, palpitations, abdominal pain, N/V or lower extremity swelling.    Past Medical History   Diagnosis Date    Alcohol abuse     BPH (benign prostatic hyperplasia)     Diabetes mellitus     Hypertension     Hyponatremia        Past Surgical History   Procedure Laterality Date    Hernia repair         Review of patient's allergies indicates:  No Known Allergies    No current facility-administered medications on file prior to encounter.      Current Outpatient Prescriptions on File Prior to Encounter   Medication Sig    fentaNYL (DURAGESIC) 50 mcg/hr Place 1 patch onto the skin every 72 hours.    gabapentin (NEURONTIN) 300 MG capsule Take 300 mg by mouth 2 (two) times daily.    hydrocodone-acetaminophen 10-325mg (NORCO)  mg  Tab Take 1 tablet by mouth every 4 (four) hours as needed for Pain.    metformin (GLUCOPHAGE) 500 MG tablet Take 500 mg by mouth 2 (two) times daily with meals.    metoprolol succinate (TOPROL-XL) 200 MG 24 hr tablet Take 200 mg by mouth 2 (two) times daily.    quinapril (ACCUPRIL) 20 MG tablet Take 20 mg by mouth every evening.    tamsulosin (FLOMAX) 0.4 mg Cp24 Take 0.4 mg by mouth once daily.     Family History     None        Social History Main Topics    Smoking status: Former Smoker     Packs/day: 1.00     Types: Cigarettes     Quit date: 12/7/2014    Smokeless tobacco: Not on file    Alcohol use 2.4 oz/week     4 Shots of liquor per week      Comment: vodka-nightly    Drug use: No    Sexual activity: Not on file     Review of Systems   Constitutional: Negative for chills and fever.   Eyes: Negative for photophobia and visual disturbance.   Respiratory: Negative for cough, shortness of breath and wheezing.    Cardiovascular: Negative for chest pain, palpitations and leg swelling.   Gastrointestinal: Negative for abdominal pain, constipation, diarrhea, nausea and vomiting.   Genitourinary: Negative for difficulty urinating and dysuria.   Musculoskeletal: Positive for neck pain.   Skin: Negative for pallor and rash.   Neurological: Positive for weakness (upper extremity). Negative for dizziness, numbness and headaches.   Hematological: Negative for adenopathy. Does not bruise/bleed easily.   Psychiatric/Behavioral: Negative for dysphoric mood. The patient is not nervous/anxious.      Objective:     Vital Signs (Most Recent):  Temp: 96.8 °F (36 °C) (02/14/17 1354)  Pulse: 80 (02/14/17 1354)  Resp: 18 (02/14/17 1354)  BP: (!) 171/84 (02/14/17 1354)  SpO2: 95 % (02/14/17 1354) Vital Signs (24h Range):  Temp:  [96.8 °F (36 °C)-97.9 °F (36.6 °C)] 96.8 °F (36 °C)  Pulse:  [69-89] 80  Resp:  [16-20] 18  SpO2:  [94 %-100 %] 95 %  BP: (136-212)/() 171/84  Arterial Line BP: (159-204)/(66-86) 173/74      Weight: 77.2 kg (170 lb 3.1 oz)  Body mass index is 21.84 kg/(m^2).    Physical Exam   Constitutional: He is oriented to person, place, and time. He appears well-developed and well-nourished. No distress.   HENT:   Head: Normocephalic.   Right Ear: External ear normal.   Left Ear: External ear normal.        Eyes: EOM are normal. Pupils are equal, round, and reactive to light.   Neck: Normal range of motion. Neck supple.   C-collar in place   Cardiovascular: Normal rate, regular rhythm, normal heart sounds and intact distal pulses.  Exam reveals no gallop and no friction rub.    No murmur heard.  Pulmonary/Chest: Effort normal and breath sounds normal. No respiratory distress. He has no wheezes. He has no rales.   Abdominal: Soft. Bowel sounds are normal. He exhibits no distension. There is no tenderness.   Musculoskeletal: He exhibits no edema or deformity.   Lymphadenopathy:     He has no cervical adenopathy.   Neurological: He is alert and oriented to person, place, and time.   Skin: Skin is warm and dry.   Psychiatric: He has a normal mood and affect. Thought content normal.       Significant Labs:   CBC:   Recent Labs  Lab 02/13/17  0440 02/13/17  1608 02/13/17  1859 02/14/17  0533   WBC 8.48  --  6.27 7.82   HGB 10.4*  --  9.4* 10.5*   HCT 30.9* 27* 27.2* 30.9*     --  138* 142*     CMP:   Recent Labs  Lab 02/13/17  0440 02/13/17  1859 02/14/17  0533   * 126* 126*   K 4.8 4.1 4.3   CL 90* 95 93*   CO2 25 22* 23   * 201* 224*   BUN 36* 34* 33*   CREATININE 1.7* 1.5* 1.5*   CALCIUM 8.9 8.1* 8.5*   PROT 6.5 5.8* 6.1   ALBUMIN 2.7* 2.3* 2.5*   BILITOT 0.4 0.3 0.4   ALKPHOS 290* 250* 269*   AST 42* 40 36   ALT 24 23 22   ANIONGAP 11 9 10   EGFRNONAA 42.0* 48.8* 48.8*       Significant Imaging: I have reviewed all pertinent imaging results/findings within the past 24 hours.    Assessment/Plan:     Diabetes type 2, controlled  --Elevated bg in 200-300s  --Hb A1c 6.5 on Feb  2017  --Hyperglycemia likely 2/2 dexamethasone  --Increase levemir to 15 units qam (will give extra 5 units now)  --Start novolog 5 units qac with low dose ssi.      Hyponatremia  --Initially 122 on admission. Believed to be 2/2 hypovolemic hyponatremia. Pt started on fluids  --126 today  --Will hold fluids today and trend closely.        Hypertension  --Pt with significantly elevated bp last night with sbp in the 200s. Pt requiring several pushes of prn labetalol. Evaluated by critical care medicine. Ok to stay on floor.  --Increase norvasc to 10 mg qd. Continue coreg 25 bid.   --Holding ACEI 2/2 AGUILAR  --Pain control per primary service  --Discontinue IVF  --Continue prn labetalol IV 20 mg for sbp>180      Diabetic neuropathy  --Continue home gabapentin      Metastatic cancer to bone  --Colon cancer with mets to spine  --s/p corpectomy and spinal fusion  --POD #1  --Management per primary team      AGUILAR (acute kidney injury)  --Baseline Cr 1.0  --1.5 today  --Peaked at 3.4  --Avoid nephrotoxic agents  --Renally dose medications  --Monitor I/O      Benign non-nodular prostatic hyperplasia without lower urinary tract symptoms  --Continue home tamsulosin      VTE Risk Mitigation         Ordered     Medium Risk of VTE  Once      02/13/17 1859     Place sequential compression device  Until discontinued      02/13/17 1859     Place DARIO hose  Until discontinued      02/13/17 1859     Place DARIO hose  Until discontinued      02/09/17 1602     Place sequential compression device  Until discontinued      02/09/17 1602        Thank you for your consult. I will follow-up with patient. Please contact us if you have any additional questions.    Mariya Hillman MD  Department of Hospital Medicine   [facility]

## 2017-02-14 NOTE — PROGRESS NOTES
Ortho/Spine Postop Progress Note    Postop day: 1    ID: The patient is a 61 y.o. male status post: pending C5-C6 corpectomy with C4-7 anterior cervical fusion for myelopathy    Overnight Events: hypertensive all night.  Pain 7/10    Vitals:    02/14/17 0500   BP:    Pulse: 71   Resp:    Temp:        Drain Output  02/12 1901 - 02/13 1900  In: 1900   Out: 4225     Physical Exam:  NAD, A/O x 3.  Wound c/d/i with clean dressing.  Biceps, Triceps, brachioradialis, wrist flexion/extension, finger abduction 3/5 bilaterally.  sensatino intact but decreased bilaterally  Left foot numbness.  3/5 ankle df/pf.  LEFT EHL 2/5    Assessment: The patient is a 61 y.o. male status post: day of surgery for C5 possible C6 corpectomy for metastatic C5 burst fracture    Plan:  1) Antibiotics: ancef postop  2) Weight bearing status: wbat in c collar  3) Labs: H/H reviewed  4) DVT Prophylaxis: mechanical  5) Lines/Drains: PIV  6) Dispo: work with PT/OT today.    Addendum:  Spoke to medicine regarding hypertension.  Will discuss with critical care medicine    Abhijit Burnett MD  Orthopedic Surgery PGY-4  [phone] pager

## 2017-02-14 NOTE — ASSESSMENT & PLAN NOTE
--Colon cancer with mets to spine  --s/p corpectomy and spinal fusion  --POD #1  --Management per primary team

## 2017-02-14 NOTE — PLAN OF CARE
POD 1 s/p corpectomy C5/C6; fusion C4/C7. PT/OT ordered to eval and treat. PT/OT recs pending. Patient currently lives alone but has the support of his friend. CM completed discharge assessment and planning with patient. Patient verbalized understanding. All questions and concerns addressed. SW and CM will continue to follow for any additional needs. Plan A to discharge home with home health as soon as medically stable. Plan B to discharge home plans for outpatient PT. Patient requested [facility].     PCP: Brooks Gilbert MD    Pharmacy:   [facility] [id number] [place], [place] - [address] [email or web address]  [address]  [facility] [address]  Phone: [phone] Fax: [phone]    Payor: BLUE CROSS BLUE SHIELD / Plan: [employer] [place] HMO / Product Type: HMO /       02/14/17 1005   Discharge Assessment   Assessment Type Discharge Planning Assessment   Confirmed/corrected address and phone number on facesheet? Yes   Assessment information obtained from? Patient;Medical Record   Expected Length of Stay (days) 3   Communicated expected length of stay with patient/caregiver yes   Prior to hospitilization cognitive status: Alert/Oriented   Prior to hospitalization functional status: Independent   Current cognitive status: Alert/Oriented   Current Functional Status: Assistive Equipment   Arrived From home or self-care   Lives With alone   Able to Return to Prior Arrangements yes   Is patient able to care for self after discharge? Yes   How many people do you have in your home that can help with your care after discharge? 1   Who are your caregiver(s) and their phone number(s)? Debra- Cielo Cummins- [phone]   Patient's perception of discharge disposition home health   Readmission Within The Last 30 Days no previous admission in last 30 days   Patient currently being followed by outpatient case management? No   Patient currently receives home health services? No   Does  the patient currently use HME? No   Patient currently receives private duty nursing? No   Patient currently receives any other outside agency services? No   Equipment Currently Used at Home none   Do you have any problems affording any of your prescribed medications? No   Is the patient taking medications as prescribed? yes   Do you have any financial concerns preventing you from receiving the healthcare you need? No   Does the patient have transportation to healthcare appointments? Yes   Transportation Available family or friend will provide  (friend- Cielo Cummins)   On Dialysis? No   Does the patient receive services at the Coumadin Clinic? No   Are there any open cases? No   Discharge Plan A Home Health;Home   Discharge Plan B Home;Other  (outpatient PT)   Patient/Family In Agreement With Plan yes

## 2017-02-14 NOTE — PROGRESS NOTES
The patient is a 61 y.o. male status post: pending C5-C6 corpectomy with C4-7 anterior cervical fusion for myelopathy  C/o pain and rubbing with the c-collar  Noted a open blister and purulent tan exudate under the transparent film from his surgical dressing.  Removed film covering open wound, cleaned and dressed with mepilex foam to cushion from collar rubbing and causing pressure.  Attempted to see patient for skin assessment as we were consulted for a sacral stage II, but pt is refusing turning. Asked nursing staff to call us when he is up with therapy for sacral skin assessment.   Tab Alonso RN,CWON  [id number]

## 2017-02-14 NOTE — ASSESSMENT & PLAN NOTE
--Pt with significantly elevated bp last night with sbp in the 200s. Pt requiring several pushes of prn labetalol  --Increase norvasc to 10 mg qd. Continue coreg 25 bid.   --Holding ACEI 2/2 AGUILAR  --Pain control per primary service  --Discontinue IVF  --Continue prn labetalol IV 20 mg for sbp>180

## 2017-02-14 NOTE — OP NOTE
DATE OF PROCEDURE:  02/13/2017.    SURGEON:  Franklyn Ng M.D.    COSURGEON:  Feng Casarez M.D., of the Neurosurgery Service.    PREOPERATIVE DIAGNOSES:  Suspected metastatic colonic adenocarcinoma with C5   pathologic burst fracture and myelopathy.    POSTOPERATIVE DIAGNOSES:  Suspected metastatic colonic adenocarcinoma with C5   pathologic burst fracture and myelopathy.    PROCEDURES PERFORMED:  1.  C5 and C6 corpectomies for treatment of pathologic fracture and myelopathy.  2.  Anterior cervical spinal fusion, C4 to C7.  3.  Application of titanium cage to C5 and C6 corpectomy defect.  4.  Anterior instrumentation, C4 to C7.  5.  Cadaveric bone grafting.    ANESTHESIA:  General endotracheal anesthesia.    ESTIMATED BLOOD LOSS:  100 mL.    SPECIMENS:  Excised C5 vertebral tissue to Pathology as well as cultures.    COMPLICATIONS:  None.    DRAINS:  One deep.    SPONGE AND NEEDLE COUNT:  Correct x2.    IMPLANTS:  DePuy [place] plate and a titanium pyramidal mesh cage.    NEUROLOGIC MONITORING NOTES:  Motor-evoked potentials, somatosensory-evoked   potentials, free-run EMG as well as vocal fold monitoring.  Please note that the   patient had absent bilateral lower extremity motor and somatosensory evoked   potentials consistent with his known myelopathy.  Bilateral upper extremity   motors and SSEPs remained stable and reliable throughout the entire procedure.  He had no   significant EMG or vocal fold activity.    REASON FOR OPERATION AND BRIEF HISTORY AND PHYSICAL:  Rah Puente is a   61-year-old male with a C5 pathologic burst fracture in the setting of suspected   metastatic adenocarcinoma of the colon.  He has had progressive bilateral upper   and lower extremity weakness over the past two weeks.  He is here for surgery   today.    DESCRIPTION OF INFORMED CONSENT:  I had a discussion of the risks, benefits and   alternatives to the surgery with the patient as well as his brother and sister.    We  specifically discussed the risks, benefits and alternatives to surgery,   including, but not limited to bleeding, infection, recurrent fracture, failure   to improve neurological deficits, hardware failure; medical complications such   as deep venous thrombosis, pulmonary embolism, stroke, death as well as other   specific surgical complications including vertebral artery injury, esophageal or   tracheal injury as well as spinal cord injury.  All questions were answered and   informed consent was thus obtained.    DESCRIPTION OF PROCEDURE:  The patient was met in the preoperative area where   his right-sided neck was marked as the operative site.  Subsequently, he was   brought to the Operating Room where general endotracheal anesthesia was induced   after sequential compression devices were placed on the calves and run   throughout the case.  A Bonilla catheter was already indwelling.  At this point,   the patient was positioned supine with a shoulder roll to help create gentle   cervical extension and reduce his C5 burst fracture.  AP and lateral radiographs   were taken, demonstrating dramatically improved alignment of his cervical   spine.  At this point, the patient's anterior cervical spine was prepped and   draped in normal sterile fashion.    A full timeout was then called identifying the patient, the procedural site and   levels, the availability of all instruments and implants and no specific   nursing, surgical, anesthetic or neurological monitoring concerns.  Finally, it   was safe to proceed with surgery.  The patient was given a weight-appropriate   dose of Ancef by the Anesthesia Service.    I then infiltrated the planned incision with 7 mL of 1% lidocaine with   epinephrine.    Next, working in tandem with Dr. Casarez, we made a right-sided transverse   cervical incision and performed a standard James-Vazquez approach to the   anterior cervical spine.  Multiple bridging vessels were encountered and  ligated   with 3-0 silk ties.  At this point, we performed a preliminary exposure over   the patient's anterior cervical spine with Kittner dissectors and placed a   spinal needle in what we took to be the C6 vertebral body.  We then took a   lateral radiograph and confirmed our level.  We then finalized the exposure.  At   the conclusion of our exposure, we could see from the midpoint of the C4 to the   midpoint of the C7 vertebra and from uncinate process to the uncinate process   at each site.  At this point, we performed a C5 corpectomy with a combination of   pituitary rongeurs as well as straight curettes. This tissue was sent to pathology.  Essentially complete   destruction of the vertebral body secondary to pathologic invasion was noted.    At this point, we brought in the operating room microscope and performed PLL   takedown under microscopic visualization.  At the conclusion of our PLL   takedown, we could see the dura from the C4 endplate to the C6 endplate with no   residual significant neurological compression.  At this point, I explored the C6   vertebral body.  There was a very large cavitary void in it consistent with the   patient's known metastatic disease.  I did not feel that it would be reasonable   to stop the instrumentation at C6 because of the risk of cage subsidence.    Therefore, I first performed a trough style C6 corpectomy leaving the posterior   wall intact.  Hemostasis was achieved with bone wax as well as FloSeal and   patties.  The wound was then thoroughly irrigated.  A titanium pyramidal mesh   cage was then measured, cut and filled with DBX putty and inserted into the C5   and C6 corpectomy defect.  An anterior plate was then applied in a standard   fashion spanning C4 to C7.  The final radiographs were taken, demonstrating   adequate position of all implants as well as correct levels.  The cam locks were   then final tightened with the  provided torque device.  A  deep   drain was then placed.  The wound was then closed with 3-0 Vicryl followed by   4-0 and 5-0 Monocryl and Steri-Strips for the skin.  The drain was secured in   place with a 2-0 silk suture.  A soft dressing was then placed.  The patient was   then placed in an Aspen collar, extubated and transferred to the Recovery Room   in stable condition.    JUSTIFICATION OF CO-SURGEON: This was a complex spinal pathologic fracture in a frail patient. The combined efforts of two surgeons is indicated to expedite surgery, decrease blood loss and improve outcomes.    ADDENDUM: Approximately 80% of the C5 and 70% of the C6 vertebral bodies were removed.

## 2017-02-14 NOTE — PROGRESS NOTES
Obtained an order from MD on call for Hem Onc Dr. Benjamin for cardiac monitoring as it is needed to administered PRN labetalol for patient's elevated BP. Administering labetalol and continuing to monitor patient's BP closely.

## 2017-02-14 NOTE — ASSESSMENT & PLAN NOTE
--Elevated bg in 200-300s  --Hb A1c 6.5 on Feb 2017  --Hyperglycemia likely 2/2 dexamethasone  --Increase levemir to 15 units qam (will give extra 5 units now)  --Start novolog 5 units qac with low dose ssi.

## 2017-02-15 LAB
ALBUMIN SERPL BCP-MCNC: 2.5 G/DL
ALP SERPL-CCNC: 253 U/L
ALT SERPL W/O P-5'-P-CCNC: 19 U/L
ANION GAP SERPL CALC-SCNC: 9 MMOL/L
AST SERPL-CCNC: 32 U/L
BASOPHILS # BLD AUTO: 0 K/UL
BASOPHILS NFR BLD: 0 %
BILIRUB SERPL-MCNC: 0.3 MG/DL
BUN SERPL-MCNC: 35 MG/DL
CALCIUM SERPL-MCNC: 9.1 MG/DL
CHLORIDE SERPL-SCNC: 90 MMOL/L
CO2 SERPL-SCNC: 29 MMOL/L
CREAT SERPL-MCNC: 1.4 MG/DL
DIFFERENTIAL METHOD: ABNORMAL
EOSINOPHIL # BLD AUTO: 0 K/UL
EOSINOPHIL NFR BLD: 0 %
ERYTHROCYTE [DISTWIDTH] IN BLOOD BY AUTOMATED COUNT: 13.4 %
EST. GFR  (AFRICAN AMERICAN): >60 ML/MIN/1.73 M^2
EST. GFR  (NON AFRICAN AMERICAN): 53.1 ML/MIN/1.73 M^2
GLUCOSE SERPL-MCNC: 213 MG/DL
HCT VFR BLD AUTO: 33.3 %
HGB BLD-MCNC: 11.3 G/DL
LYMPHOCYTES # BLD AUTO: 0.7 K/UL
LYMPHOCYTES NFR BLD: 6.7 %
MAGNESIUM SERPL-MCNC: 1.7 MG/DL
MCH RBC QN AUTO: 28.8 PG
MCHC RBC AUTO-ENTMCNC: 33.9 %
MCV RBC AUTO: 85 FL
MONOCYTES # BLD AUTO: 0.6 K/UL
MONOCYTES NFR BLD: 6.5 %
NEUTROPHILS # BLD AUTO: 8.5 K/UL
NEUTROPHILS NFR BLD: 85.9 %
PHOSPHATE SERPL-MCNC: 3.3 MG/DL
PLATELET # BLD AUTO: 181 K/UL
PMV BLD AUTO: 9.9 FL
POCT GLUCOSE: 134 MG/DL (ref 70–110)
POCT GLUCOSE: 182 MG/DL (ref 70–110)
POCT GLUCOSE: 190 MG/DL (ref 70–110)
POCT GLUCOSE: 193 MG/DL (ref 70–110)
POCT GLUCOSE: 206 MG/DL (ref 70–110)
POCT GLUCOSE: 224 MG/DL (ref 70–110)
POTASSIUM SERPL-SCNC: 4.5 MMOL/L
PROT SERPL-MCNC: 6.3 G/DL
RBC # BLD AUTO: 3.92 M/UL
SODIUM SERPL-SCNC: 128 MMOL/L
WBC # BLD AUTO: 9.84 K/UL

## 2017-02-15 PROCEDURE — 97530 THERAPEUTIC ACTIVITIES: CPT

## 2017-02-15 PROCEDURE — 99232 SBSQ HOSP IP/OBS MODERATE 35: CPT | Mod: ,,, | Performed by: INTERNAL MEDICINE

## 2017-02-15 PROCEDURE — 85025 COMPLETE CBC W/AUTO DIFF WBC: CPT

## 2017-02-15 PROCEDURE — 63600175 PHARM REV CODE 636 W HCPCS: Performed by: INTERNAL MEDICINE

## 2017-02-15 PROCEDURE — 63600175 PHARM REV CODE 636 W HCPCS: Performed by: STUDENT IN AN ORGANIZED HEALTH CARE EDUCATION/TRAINING PROGRAM

## 2017-02-15 PROCEDURE — 80053 COMPREHEN METABOLIC PANEL: CPT

## 2017-02-15 PROCEDURE — 83735 ASSAY OF MAGNESIUM: CPT

## 2017-02-15 PROCEDURE — 97116 GAIT TRAINING THERAPY: CPT

## 2017-02-15 PROCEDURE — 36415 COLL VENOUS BLD VENIPUNCTURE: CPT

## 2017-02-15 PROCEDURE — 25000003 PHARM REV CODE 250: Performed by: ORTHOPAEDIC SURGERY

## 2017-02-15 PROCEDURE — 25000003 PHARM REV CODE 250: Performed by: INTERNAL MEDICINE

## 2017-02-15 PROCEDURE — 99252 IP/OBS CONSLTJ NEW/EST SF 35: CPT | Mod: ,,, | Performed by: NURSE PRACTITIONER

## 2017-02-15 PROCEDURE — 84100 ASSAY OF PHOSPHORUS: CPT

## 2017-02-15 PROCEDURE — 97535 SELF CARE MNGMENT TRAINING: CPT

## 2017-02-15 PROCEDURE — 97168 OT RE-EVAL EST PLAN CARE: CPT

## 2017-02-15 PROCEDURE — 20600001 HC STEP DOWN PRIVATE ROOM

## 2017-02-15 PROCEDURE — 25000003 PHARM REV CODE 250: Performed by: STUDENT IN AN ORGANIZED HEALTH CARE EDUCATION/TRAINING PROGRAM

## 2017-02-15 RX ORDER — INSULIN ASPART 100 [IU]/ML
6 INJECTION, SOLUTION INTRAVENOUS; SUBCUTANEOUS
Status: DISCONTINUED | OUTPATIENT
Start: 2017-02-15 | End: 2017-02-17 | Stop reason: HOSPADM

## 2017-02-15 RX ADMIN — GABAPENTIN 300 MG: 300 CAPSULE ORAL at 06:02

## 2017-02-15 RX ADMIN — LABETALOL HYDROCHLORIDE 20 MG: 5 INJECTION, SOLUTION INTRAVENOUS at 11:02

## 2017-02-15 RX ADMIN — LABETALOL HYDROCHLORIDE 20 MG: 5 INJECTION, SOLUTION INTRAVENOUS at 01:02

## 2017-02-15 RX ADMIN — OXYCODONE HYDROCHLORIDE 10 MG: 10 TABLET, FILM COATED, EXTENDED RELEASE ORAL at 09:02

## 2017-02-15 RX ADMIN — OXYCODONE HYDROCHLORIDE 15 MG: 5 TABLET ORAL at 05:02

## 2017-02-15 RX ADMIN — DEXAMETHASONE 8 MG: 4 TABLET ORAL at 09:02

## 2017-02-15 RX ADMIN — CARVEDILOL 25 MG: 25 TABLET, FILM COATED ORAL at 09:02

## 2017-02-15 RX ADMIN — TAMSULOSIN HYDROCHLORIDE 0.4 MG: 0.4 CAPSULE ORAL at 09:02

## 2017-02-15 RX ADMIN — LABETALOL HYDROCHLORIDE 20 MG: 5 INJECTION, SOLUTION INTRAVENOUS at 05:02

## 2017-02-15 RX ADMIN — INSULIN ASPART 6 UNITS: 100 INJECTION, SOLUTION INTRAVENOUS; SUBCUTANEOUS at 06:02

## 2017-02-15 RX ADMIN — OXYCODONE HYDROCHLORIDE 15 MG: 5 TABLET ORAL at 11:02

## 2017-02-15 RX ADMIN — INSULIN ASPART 6 UNITS: 100 INJECTION, SOLUTION INTRAVENOUS; SUBCUTANEOUS at 01:02

## 2017-02-15 RX ADMIN — INSULIN ASPART 5 UNITS: 100 INJECTION, SOLUTION INTRAVENOUS; SUBCUTANEOUS at 09:02

## 2017-02-15 RX ADMIN — FAMOTIDINE 20 MG: 20 TABLET, FILM COATED ORAL at 09:02

## 2017-02-15 RX ADMIN — INSULIN ASPART 1 UNITS: 100 INJECTION, SOLUTION INTRAVENOUS; SUBCUTANEOUS at 01:02

## 2017-02-15 RX ADMIN — INSULIN DETEMIR 18 UNITS: 100 INJECTION, SOLUTION SUBCUTANEOUS at 09:02

## 2017-02-15 RX ADMIN — GABAPENTIN 300 MG: 300 CAPSULE ORAL at 02:02

## 2017-02-15 RX ADMIN — INSULIN ASPART 2 UNITS: 100 INJECTION, SOLUTION INTRAVENOUS; SUBCUTANEOUS at 05:02

## 2017-02-15 RX ADMIN — GABAPENTIN 300 MG: 300 CAPSULE ORAL at 09:02

## 2017-02-15 RX ADMIN — AMLODIPINE BESYLATE 10 MG: 10 TABLET ORAL at 09:02

## 2017-02-15 NOTE — PLAN OF CARE
Problem: Patient Care Overview  Goal: Plan of Care Review  Outcome: Ongoing (interventions implemented as appropriate)  Plan of care reviewed with patient. Patient verbalized understanding.  Patient is AAO and VSS. Pain managed with PRN pain meds.  No complaint of nausea or vomiting. Void 250ml after nguyen removal.  SUSIE in place to Left neck.  C collar in at all times.  R Shoulder pressure ulcer covered with mepilex.  Suspected DTI to sacrum coved in barrier cream.  Tolerating GI soft diet well.  On Cardiac monitoring running NSR.  Accucheks performed ACHS.  Ambulated in de oliveira with PT.  Up in chair throughout most of shift. Free of falls and injury. Will continue to monitor. Sulma Sykes RN

## 2017-02-15 NOTE — PT/OT/SLP RE-EVAL
Occupational Therapy  Re-evaluation    Rah Puente   MRN: [id number]   Admitting Diagnosis: Upper extremity weakness    OT Date of Treatment: 02/15/17   OT Start Time: 1135  OT Stop Time: 1215  OT Total Time (min): 40 min    Billable Minutes:  Re-eval 20  Self Care/Home Management 10  Therapeutic Activity 10    Diagnosis: Upper extremity weakness   Pt is s/p C5-6 Corpectomy and C4-7 ACDF 2' C5 pathologic fx due to mets to spine from lung CA; also cervical myelopathy and AGUILAR.    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 Abhijit Burnett  Date referred to OT: 2/13/2017    General Precautions: Standard, fall  Orthopedic Precautions: N/A  Braces: Aspen collar          Patient History:  Living Environment  Living Environment Comment: Pt lives alone in shot-gun style home in the [facility]. Home has 5 JULIA with B hand rails. Home has tub/shower combo with shower bench. Pt uses sock aid for LB dressing. He reported 2 falls at home since Dec 2016. Pt is still working full time.   Equipment Currently Used at Home: bedside commode, shower chair    Prior level of  "function:   Bed Mobility/Transfers: needs assist  Grooming: independent  Bathing: independent  Upper Body Dressing: independent  Lower Body Dressing: needs device (sock aid)  Toileting: independent  Homemaking Responsibilities: Yes  Occupation: Full time employment  Type of Occupation: [employer]     Dominant hand: right    Subjective:  Communicated with RN prior to session.  No issues  Chief Complaint: Pt anxious throughout the session, questioning 'the point' and then quickly agreeing that he understands that he does need to move and get up  Patient/Family stated goals: "I just want to go home.  But I know that can't happen"    Pain Ratin/10                   Objective:  Patient found with: cervical collar, SUSIE drain, telemetry    Cognitive Exam:  Oriented to: Person, Place, Time and Situation  Follows Commands/attention: Follows one-step commands  Communication: clear/fluent  Memory:  Impaired STM and Poor immediate recall  Safety awareness/insight to disability: impaired  Coping skills/emotional control: Lashes out and some cursing then apologizing    Visual/perceptual:  Intact    Physical Exam:  Postural examination/scapula alignment: Rounded shoulder, Head forward and Posterior pelvic tilt  Skin integrity: Visible skin intact  Edema: None noted     Sensation:   Absent to touch B feet; intact BUE    Upper Extremity Range of Motion:  Right Upper Extremity: sh fl AROM 10 deg, elbow fl arom 80%; wrist/finger arom wnl.  PROM sh 140 deg.  Left Upper Extremity: sh hfl AROM zero, elbow fl arom 60%; wrist/finger arom wnl .  PROm sh fl 150 deg.    Upper Extremity Strength:  Right Upper Extremity: shoulder 2-/5, elbow 3-/5  Left Upper Extremity: shoulder 1+/5, elbow 2+/5   Strength: 3/5 bilat    Fine motor coordination:   Impaired 2' prox and distal weakness    Gross motor coordination: Impaired 2' prox weakness    Functional Mobility:  Bed Mobility:       Transfers:  Sit <> Stand Assistance: Minimum " Assistance  Sit <> Stand Assistive Device: Rolling Walker  Toilet Transfer Technique: Stand Pivot  Toilet Transfer Assistance: Minimum Assistance  Toilet Transfer Assistive Device: Rolling Walker    Functional Ambulation: Amb to bathroom with RW and CG A; min cues for walker safety.    Activities of Daily Living:       UE Dressing Level of Assistance: Maximum assistance    LE Dressing Level of Assistance: Total assistance       Grooming Level of Assistance: Moderate assistance  Toileting Where Assessed: Toilet  Toileting Level of Assistance: Total assistance (total a to clean self after toileting; impaired sensation for BM, not fully continent)            Balance:   Static Sit: FAIR+: Able to take MINIMAL challenges from all directions  Dynamic Sit: FAIR+: Maintains balance through MINIMAL excursions of active trunk motion  Static Stand: FAIR: Maintains without assist but unable to take challenges  Dynamic stand: FAIR: Needs CONTACT GUARD during gait    Therapeutic Activities and Exercises:  -Worked on toilet transfers and toileting with pt as above  -Adapted pt's bedside chair with extra cushion and pillow to increase height for safer and easier transfers  -Collaborated with nursing on getting BSC to the room  -Began working with pt on managing his anxiety; the pt became very anxious as he sat on the toilet, insisting OT obtain assistance before standing up and walking to chair.  OT did so; CNA stood back.  OT pointed out that she was not needed.  Pt not fully attending.  -Pt demonstrated memory issues throughout the session    AM-PAC 6 CLICK ADL  How much help from another person does this patient currently need?  1 = Unable, Total/Dependent Assistance  2 = A lot, Maximum/Moderate Assistance  3 = A little, Minimum/Contact Guard/Supervision  4 = None, Modified [place]/Independent    Putting on and taking off regular lower body clothing? : 2  Bathing (including washing, rinsing, drying)?: 2  Toileting, which  "includes using toilet, bedpan, or urinal? : 2  Putting on and taking off regular upper body clothing?: 2  Taking care of personal grooming such as brushing teeth?: 2  Eating meals?: 3  Total Score: 13    AM-PAC Raw Score CMS "G-Code Modifier Level of Impairment Assistance   6 % Total / Unable   7 - 9 CM 80 - 100% Maximal Assist   10 - 14 CL 60 - 80% Moderate Assist   15 - 19 CK 40 - 60% Moderate Assist   20 - 22 CJ 20 - 40% Minimal Assist   23 CI 1-20% SBA / CGA   24 CH 0% Independent/ Mod I       Patient left up in chair with all lines intact and call button in reach    Assessment:  Rah Puente is a 61 y.o. male with a medical diagnosis of Upper extremity weakness 2' C5 fx, now s/p corpectomy and C4-7 ACDF.  The pt is much improved compared to the initial OT eval prior to the sx.  He was total a all adls at that time.  He is now at min to total  A self-care and functional mobility 2' to impaired strength BUE/BLEs, impaired standing balance/tolerance, impaired trunk./neck arom/strength, impaired STM and immediate re-call, anxiety/fear of falling, impaired attn to task/problem solving, lack of sensation B feet, impaired GM and FM coord, max impaired arom B shoulders, low activity tolerance/endurance.  Pt was previously indep all self-care and I-ADLS, including working full time.  Pt to be seen by OT to increase self-care indep.    Rehab identified problem list/impairments: Rehab identified problem list/impairments: weakness, impaired endurance, impaired sensation, impaired self care skills, impaired functional mobilty, impaired balance, decreased coordination, decreased upper extremity function, decreased lower extremity function, impaired cardiopulmonary response to activity (anxiety)    Rehab potential is good.    Activity tolerance: Good    Discharge recommendations: Discharge Facility/Level Of Care Needs: rehabilitation facility     Barriers to discharge: Barriers to Discharge: Inaccessible home " environment, Decreased caregiver support    Equipment recommendations: wheelchair     GOALS:   Occupational Therapy Goals        Problem: Occupational Therapy Goal    Goal Priority Disciplines Outcome Interventions   Occupational Therapy Goal     OT, PT/OT Ongoing (interventions implemented as appropriate)    Description:  Goals to be met by: 2/24     Patient will increase functional independence with ADLs by performing:    Demo understanding for spinal safety with bed mobility.  UE Dressing in sitting with Set-up Assistance and Stand-by Assistance.  LE Dressing with Set-up Assistance, Minimal Assistance and Assistive Devices as needed.  Grooming while seated at sink with Stand-by Assistance.  Supine to sit with Contact Guard Assistance.  Stand pivot transfers with Minimal Assistance.  Sustained grasp on functional item (L hand) for 3 minute duration with SBA.                PLAN:  Patient to be seen 4 x/week to address the above listed problems via self-care/home management, therapeutic activities, therapeutic exercises, neuromuscular re-education  Plan of Care expires: 03/12/17  Plan of Care reviewed with: patient         ZORAIDA Sethi  02/15/2017

## 2017-02-15 NOTE — ASSESSMENT & PLAN NOTE
--Pt continues to have elevated BP requiring labetalol pushes; however, bp maintaining in the 170s-180s instead of 200s.  --Pt asymptomatic  --Continue [id number] mg qd and coreg 25 bid. Expect bp to continue to be elevated for a couple more days as norvasc was only increased yesterday  --Holding ACEI 2/2 AGUILAR  --Pain control per primary service  --Continue prn labetalol IV 20 mg for sbp>180

## 2017-02-15 NOTE — ASSESSMENT & PLAN NOTE
--Initially 122 on admission. Believed to be 2/2 hypovolemic hyponatremia. Pt started on fluids (discontinued on 2/14)  --Continues to improve. 128 today.  --Trend closely.

## 2017-02-15 NOTE — PROGRESS NOTES
[facility] Hospital Medicine  Progress Note    Patient Name: Rah Puente  MRN: [id number]  Patient Class: IP- Inpatient   Admission Date: 2/9/2017  Length of Stay: 6 days  Attending Physician: Franklyn Ng MD  Primary Care Provider: Brooks Gilbert MD    [facility] Medicine Team: Networked reference to record PCT  Mariya Hillman MD    Subjective:     Principal Problem:Upper extremity weakness    HPI:  61 year old male with PMHx T2DM, HTN, HLD, stage I colon Ca who prsented to hospital with neck pain and UE weakness. CT imaging was found to be concerning for mets. Pt was initially admitted to the hem/onc service, but is now on the orthopedic service s/p C5-6 corpectomy with C4-7 anterior cervical fusion. Medicine was consulted for co-management of his medical issues. This afternoon, pt is not reporting much pain. He denies any HA, chest pain, vision changes, shortness of breath, palpitations, abdominal pain, N/V or lower extremity swelling.    Hospital Course:       Interval History: No AEON. Per nursing notes, pt had period of confusion; however, when asked patient about it, he states it was just an misunderstanding. He reports his pain is well controlled.    Review of Systems   Constitutional: Negative for chills and fever.   Respiratory: Negative for cough, shortness of breath and wheezing.    Cardiovascular: Negative for chest pain, palpitations and leg swelling.   Gastrointestinal: Negative for abdominal pain, constipation, diarrhea, nausea and vomiting.   Musculoskeletal: Positive for neck pain (well controlled).   Neurological: Negative for headaches.     Objective:     Vital Signs (Most Recent):  Temp: 97.2 °F (36.2 °C) (02/15/17 0845)  Pulse: 69 (02/15/17 0900)  Resp: 18 (02/15/17 0845)  BP: (!) 180/93 (02/15/17 0845)  SpO2: 99 % (02/15/17 0845) Vital Signs (24h Range):  Temp:  [96.6 °F (35.9 °C)-97.8 °F (36.6 °C)] 97.2 °F (36.2 °C)  Pulse:  [] 69  Resp:  [16-20]  18  SpO2:  [95 %-99 %] 99 %  BP: (152-196)/() 180/93     Weight: 77.2 kg (170 lb 3.1 oz)  Body mass index is 21.84 kg/(m^2).    Intake/Output Summary (Last 24 hours) at 02/15/17 1010  Last data filed at 02/15/17 0520   Gross per 24 hour   Intake             2450 ml   Output             2505 ml   Net              -55 ml      Physical Exam   Constitutional: He is oriented to person, place, and time. He appears well-developed and well-nourished. No distress.   Neck:   C collar in place     Cardiovascular: Normal rate, regular rhythm, normal heart sounds and intact distal pulses.  Exam reveals no gallop and no friction rub.    No murmur heard.  Pulmonary/Chest: Effort normal and breath sounds normal. No respiratory distress. He has no wheezes. He has no rales.   Abdominal: Soft. Bowel sounds are normal. He exhibits no distension. There is no tenderness.   Musculoskeletal: He exhibits no edema or deformity.   Neurological: He is alert and oriented to person, place, and time.   Psychiatric: He has a normal mood and affect. Thought content normal.       Significant Labs:   CBC:   Recent Labs  Lab 02/13/17 1859 02/14/17  0533 02/15/17  0556   WBC 6.27 7.82 9.84   HGB 9.4* 10.5* 11.3*   HCT 27.2* 30.9* 33.3*   * 142* 181     CMP:   Recent Labs  Lab 02/13/17 1859 02/14/17  0533 02/15/17  0556   * 126* 128*   K 4.1 4.3 4.5   CL 95 93* 90*   CO2 22* 23 29   * 224* 213*   BUN 34* 33* 35*   CREATININE 1.5* 1.5* 1.4   CALCIUM 8.1* 8.5* 9.1   PROT 5.8* 6.1 6.3   ALBUMIN 2.3* 2.5* 2.5*   BILITOT 0.3 0.4 0.3   ALKPHOS 250* 269* 253*   AST 40 36 32   ALT 23 22 19   ANIONGAP 9 10 9   EGFRNONAA 48.8* 48.8* 53.1*       Significant Imaging: I have reviewed all pertinent imaging results/findings within the past 24 hours.    Assessment/Plan:      Type 2 diabetes mellitus with diabetic polyneuropathy, without long-term current use of insulin  --Elevated bg in 200s  --Hb A1c 6.5 on Feb 2017  --Hyperglycemia likely  2/2 dexamethasone  --Increase levemir to 18 units qam  --Increase novolog to 6 units qac with low dose ssi.      Chronic hyponatremia  --Initially 122 on admission. Believed to be 2/2 hypovolemic hyponatremia. Pt started on fluids (discontinued on 2/14)  --Continues to improve. 128 today.  --Trend closely.        Essential hypertension  --Pt continues to have elevated BP requiring labetalol pushes; however, bp maintaining in the 170s-180s instead of 200s.  --Pt asymptomatic  --Continue [id number] mg qd and coreg 25 bid. Expect bp to continue to be elevated for a couple more days as norvasc was only increased yesterday  --Holding ACEI 2/2 AGUILAR  --Pain control per primary service  --Continue prn labetalol IV 20 mg for sbp>180      Malignant neoplasm metastatic to bone  --Colon cancer with mets to spine  --s/p corpectomy and spinal fusion  --POD #2  --Management per primary team      AGUILAR (acute kidney injury)  --Baseline Cr 1.0  --Continues to trend downward. 1.4 today  --Peaked at 3.4  --Avoid nephrotoxic agents  --Renally dose medications  --Monitor I/O      Benign non-nodular prostatic hyperplasia without lower urinary tract symptoms  --Continue home tamsulosin      VTE Risk Mitigation         Ordered     Medium Risk of VTE  Once      02/13/17 1859     Place sequential compression device  Until discontinued      02/13/17 1859     Place DARIO hose  Until discontinued      02/13/17 1859     Place DARIO hose  Until discontinued      02/09/17 1602     Place sequential compression device  Until discontinued      02/09/17 1602          Mariya Hillman MD  Department of Hospital Medicine   [facility]

## 2017-02-15 NOTE — SUBJECTIVE & OBJECTIVE
Interval History: No AEON. Per nursing notes, pt had period of confusion; however, when asked patient about it, he states it was just an misunderstanding. He reports his pain is well controlled.    Review of Systems   Constitutional: Negative for chills and fever.   Respiratory: Negative for cough, shortness of breath and wheezing.    Cardiovascular: Negative for chest pain, palpitations and leg swelling.   Gastrointestinal: Negative for abdominal pain, constipation, diarrhea, nausea and vomiting.   Musculoskeletal: Positive for neck pain (well controlled).   Neurological: Negative for headaches.     Objective:     Vital Signs (Most Recent):  Temp: 97.2 °F (36.2 °C) (02/15/17 0845)  Pulse: 69 (02/15/17 0900)  Resp: 18 (02/15/17 0845)  BP: (!) 180/93 (02/15/17 0845)  SpO2: 99 % (02/15/17 0845) Vital Signs (24h Range):  Temp:  [96.6 °F (35.9 °C)-97.8 °F (36.6 °C)] 97.2 °F (36.2 °C)  Pulse:  [] 69  Resp:  [16-20] 18  SpO2:  [95 %-99 %] 99 %  BP: (152-196)/() 180/93     Weight: 77.2 kg (170 lb 3.1 oz)  Body mass index is 21.84 kg/(m^2).    Intake/Output Summary (Last 24 hours) at 02/15/17 1010  Last data filed at 02/15/17 0520   Gross per 24 hour   Intake             2450 ml   Output             2505 ml   Net              -55 ml      Physical Exam   Constitutional: He is oriented to person, place, and time. He appears well-developed and well-nourished. No distress.   Neck:   C collar in place     Cardiovascular: Normal rate, regular rhythm, normal heart sounds and intact distal pulses.  Exam reveals no gallop and no friction rub.    No murmur heard.  Pulmonary/Chest: Effort normal and breath sounds normal. No respiratory distress. He has no wheezes. He has no rales.   Abdominal: Soft. Bowel sounds are normal. He exhibits no distension. There is no tenderness.   Musculoskeletal: He exhibits no edema or deformity.   Neurological: He is alert and oriented to person, place, and time.   Psychiatric: He has a  normal mood and affect. Thought content normal.       Significant Labs:   CBC:   Recent Labs  Lab 02/13/17  1859 02/14/17  0533 02/15/17  0556   WBC 6.27 7.82 9.84   HGB 9.4* 10.5* 11.3*   HCT 27.2* 30.9* 33.3*   * 142* 181     CMP:   Recent Labs  Lab 02/13/17  1859 02/14/17  0533 02/15/17  0556   * 126* 128*   K 4.1 4.3 4.5   CL 95 93* 90*   CO2 22* 23 29   * 224* 213*   BUN 34* 33* 35*   CREATININE 1.5* 1.5* 1.4   CALCIUM 8.1* 8.5* 9.1   PROT 5.8* 6.1 6.3   ALBUMIN 2.3* 2.5* 2.5*   BILITOT 0.3 0.4 0.3   ALKPHOS 250* 269* 253*   AST 40 36 32   ALT 23 22 19   ANIONGAP 9 10 9   EGFRNONAA 48.8* 48.8* 53.1*       Significant Imaging: I have reviewed all pertinent imaging results/findings within the past 24 hours.

## 2017-02-15 NOTE — PHYSICIAN QUERY
"PT Name: Rah Puente  MR #: [id number]  Physician Query Form -Integumentary  System Clarification     Reviewer  Ext [phone] Cristian Martínez RN,BSN,CDI    This form is a permanent document in the medical record.     Query Date: February 15, 2017  By submitting this query, we are merely seeking further clarification of documentation. Please utilize your independent clinical judgment when addressing the question(s) below.    The Medical record reflects the following:   Indicators   Supporting Clinical Findings Location in Medical Record   x "Redness" documented Periwound Area   redness   02/14/17 Wound Care Progress Note    "Decubitus" documented     x "Ulcer" documented Date First Assessed/Time First Assessed: 02/14/17 1635   Pressure Ulcer Present on Admission: no  Orientation: medial  Location: sacral spine  Staging: suspected deep tissue injury   Wound Length (cm): 3  Wound Width (cm): 2  Depth (cm): (c)         02/14/17 Wound Care   Progress Note   x Wound care consult Wound Care Nurse 02/14/17 Wound Care Progress Note    Medication:     x Treatment: Cleansed W/   soap and water     Interventions   barrier applied  (critic aid paste)      Recommendations:   1. Apply critic aid paste to sacral area BID and prn any incontinence   2. Low air loss mattress overlay   3. frequent turning schedule               02/14/17 Wound Care   Progress Note    Other:      National Pressure Ulcer Advisory Panel (2007) Pressure Ulcer Definitions & Stages  Stage I:Intact skin with non-blanchable redness of a localized area usually over a bony prominence.  Stage II:Partial thickness loss of dermis presenting as a shallow open ulcer with a red pink wound bed, without slough.  Stage III:Full thickness tissue loss. Subcutaneous fat may be visible but bone, tendon or muscle is not exposed. Slough may be present but does not obscure the depth of tissue loss. May include undermining and tunneling.  Stage IV:Full thickness tissue loss " with exposed bone, tendon or muscle. slough or eschar may be present on some parts of the wound bed. Often include undermining and tunneling.   Unstageable:Full thickness tissue loss in which the base of the wound, the true depth, and therefore stage, cannot be determined.   Deep Tissue Injury: Purple or maroon localized area of discolored intact skin or blood-filled blister due to damage of underlying soft tissue from pressure and/or shear.     Provider, please specify the diagnosis or diagnoses associated with above clinical findings.    [x ] Decubitus (Pressure) Ulcer (Specify site, stage, laterality and Present on Admission (POA) status)  Site    Stage   POA      [  ] Ankle  [  ] Right  [  ] Left   [  ]Yes  [  ] No  [  ] Undeterminable    [  ] Buttock  [  ] Right  [  ] Left   [  ]Yes  [  ] No  [  ] Undeterminable    [ x ] Coccyx  [  ] Right  [  ] Left 2  [  ]Yes  [  ] No  [  ] Undeterminable    [  ] Elbow   [  ] Right  [  ] Left   [  ]Yes  [  ] No  [  ] Undeterminable    [  ] Head     [  ]Yes  [  ] No  [  ] Undeterminable    [  ] Heel  [  ] Right  [  ] Left   [  ]Yes  [  ] No  [  ] Undeterminable    [  ] Hip  [  ] Right  [  ] Left   [  ]Yes  [  ] No  [  ] Undeterminable    [  ] Sacrum      [  ]Yes  [  ] No  [  ] Undeterminable    [  ] Shoulder blade  [  ] Right  [  ] Left   [  ]Yes  [  ] No  [  ] Undeterminable    [  ] Back  [  ] Right   [  ] Left  [  ] Upper   [  ] Low   [  ]Yes  [  ] No  [  ] Undeterminable    [  ] Other site (specify)     [  ]Yes  [  ] No  [  ] Undeterminable                                                                                                          [ ] Other Integumentary Diagnosis (Specify)        [  ] Clinically Undetermined

## 2017-02-15 NOTE — ASSESSMENT & PLAN NOTE
--Baseline Cr 1.0  --Continues to trend downward. 1.4 today  --Peaked at 3.4  --Avoid nephrotoxic agents  --Renally dose medications  --Monitor I/O

## 2017-02-15 NOTE — PT/OT/SLP PROGRESS
"Physical Therapy  Treatment    Rah Puente   MRN: [id number]   Admitting Diagnosis: Upper extremity weakness    PT Received On: 02/15/17  PT Start Time: 1023     PT Stop Time: 1050    PT Total Time (min): 27 min       Billable Minutes:  Gait Training 12 and Therapeutic Activity 15    Treatment Type: Treatment  PT/PTA: PTA     PTA Visit Number: 1       General Precautions: Standard, fall  Orthopedic Precautions: N/A   Braces: Aspen collar    Do you have any cultural, spiritual, Tenriism conflicts, given your current situation?: none    Subjective:  Communicated with NSG prior to session.  Patient states " Here they come to torture me"    Pain Rating: 3/10  Location - Side: Left  Location - Orientation: generalized  Location: neck  Pain Addressed: Reposition, Distraction, Nurse notified  Pain Rating Post-Intervention: 8/10    Objective:   Patient found with: cervical collar, SUSIE drain, telemetry    Functional Mobility:  Bed Mobility:   Rolling/Turning Right: Minimum assistance  Scooting/Bridging: Modified Independent    Transfers:  Sit <> Stand Assistance: Minimum Assistance  Sit <> Stand Assistive Device: Rolling Walker    Gait:   Gait Distance: 13 ft with cues to increase B step length and chair follow.  Assistance 1: Minimum assistance  Gait Assistive Device: Rolling walker  Gait Pattern: 3-point gait  Gait Deviation(s): decreased davion, increased time in double stance, decreased velocity of limb motion, decreased step length, decreased stride length, decreased toe-to-floor clearance, decreased weight-shifting ability, forward lean    Stairs:      Balance:   Static Sit: GOOD-: Takes MODERATE challenges from all directions but inconsistently  Dynamic Sit: GOOD-: Maintains balance through MODERATE excursions of active trunk movement,     Static Stand: FAIR: Maintains without assist but unable to take challenges  Dynamic stand: POOR: N/A     Therapeutic Activities and Exercises:  Donned a second gown. Laq and " ap x 15 reps B LE with  Encouragement. Treatment was interrupted by Nurse Practitioner to consult patient.    AM-PAC 6 CLICK MOBILITY  How much help from another person does this patient currently need?   1 = Unable, Total/Dependent Assistance  2 = A lot, Maximum/Moderate Assistance  3 = A little, Minimum/Contact Guard/Supervision  4 = None, Modified [place]/Independent    Turning over in bed (including adjusting bedclothes, sheets and blankets)?: 3  Sitting down on and standing up from a chair with arms (e.g., wheelchair, bedside commode, etc.): 3  Moving from lying on back to sitting on the side of the bed?: 3  Moving to and from a bed to a chair (including a wheelchair)?: 3  Need to walk in hospital room?: 3  Climbing 3-5 steps with a railing?: 1  Total Score: 16    AM-PAC Raw Score CMS G-Code Modifier Level of Impairment Assistance   6 % Total / Unable   7 - 9 CM 80 - 100% Maximal Assist   10 - 14 CL 60 - 80% Moderate Assist   15 - 19 CK 40 - 60% Moderate Assist   20 - 22 CJ 20 - 40% Minimal Assist   23 CI 1-20% SBA / CGA   24 CH 0% Independent/ Mod I     Patient left up in chair with all lines intact, call button in reach, RN notified and Friend present.    Assessment:  Rah Puente is a 61 y.o. male with a medical diagnosis of Upper extremity weakness and presents with decreased functional mobility. Decreased endurance and increased anxiety noted as Patient fatigues. Declined to continue with gait training as pain on the L side of the neck increased. Patient would benefit from continued P.T. To address deficits.    Rehab identified problem list/impairments: Rehab identified problem list/impairments: weakness, impaired endurance, impaired self care skills, impaired functional mobilty, impaired balance, decreased upper extremity function, decreased lower extremity function, pain, abnormal tone, impaired fine motor    Rehab potential is fair.    Activity tolerance: Fair    Discharge  recommendations: Discharge Facility/Level Of Care Needs: nursing facility, skilled     Barriers to discharge: Barriers to Discharge: Inaccessible home environment, Decreased caregiver support    Equipment recommendations: Equipment Needed After Discharge: wheelchair     GOALS:   Physical Therapy Goals        Problem: Physical Therapy Goal    Goal Priority Disciplines Outcome Goal Variances Interventions   Physical Therapy Goal     PT/OT, PT Ongoing (interventions implemented as appropriate)     Description:  Goals to be met by: 17     Patient will increase functional independence with mobility by performin. Supine to sit with CGA-not met  2. Sit to supine with CGA-not met  3. Sit to stand transfer with RW with CGA-not met  4. Bed to chair transfer with CGA using Rolling Walker-not met  5. Gait  x 200 feet with Supervision using Rolling Walker-not met.   6. Ascend/descend 5 stair with bilateral Handrails minimal assist-not met.   7. Lower extremity exercise program x 20 reps per handout, with independence-not met.                 PLAN:    Patient to be seen 5 x/week  to address the above listed problems via gait training, therapeutic activities, therapeutic exercises, neuromuscular re-education  Plan of Care expires: 17  Plan of Care reviewed with: patient, friend         Harjinder  Herminia, PTA  02/15/2017

## 2017-02-15 NOTE — ASSESSMENT & PLAN NOTE
--Colon cancer with mets to spine  --s/p corpectomy and spinal fusion  --POD #2  --Management per primary team

## 2017-02-15 NOTE — PLAN OF CARE
Problem: Occupational Therapy Goal  Goal: Occupational Therapy Goal  Goals to be met by: 2/24     Patient will increase functional independence with ADLs by performing:    Demo understanding for spinal safety with bed mobility.  UE Dressing in sitting with Set-up Assistance and Stand-by Assistance.  LE Dressing with Set-up Assistance, Minimal Assistance and Assistive Devices as needed.  Grooming while seated at sink with Stand-by Assistance.  Supine to sit with Contact Guard Assistance.  Stand pivot transfers with Minimal Assistance.  Sustained grasp on functional item (L hand) for 3 minute duration with SBA.   Outcome: Ongoing (interventions implemented as appropriate)  Goals remain approp  ZORAIAD Daniel  2/15/2017  [phone]

## 2017-02-15 NOTE — PROGRESS NOTES
Ortho/Spine Postop Progress Note    Postop day: 2    ID: The patient is a 61 y.o. male status post: pending C5-C6 corpectomy with C4-7 anterior cervical fusion for myelopathy    Overnight Events: hypertensive all night.  Pain controlled.  +flatus.    Vitals:    02/15/17 0700   BP:    Pulse: 81   Resp:    Temp:        Drain Output  02/14 0701 - 02/15 0700  In: 2450   Out: 3330     Physical Exam:  NAD, A/O x 3.  Wound c/d/i with clean dressing.  Biceps, Triceps, brachioradialis, wrist flexion/extension, finger abduction 3/5 bilaterally.  sensation intact but decreased bilaterally  Left foot numbness.  4+/5 ankle df/pf.  LEFT EHL 3/5    Assessment: The patient is a 61 y.o. male status post: day of surgery for C5 possible C6 corpectomy for metastatic C5 burst fracture    Plan:  1) Antibiotics: ancef postop  2) Weight bearing status: wbat in c collar  3) Labs: H/H reviewed  4) DVT Prophylaxis: mechanical  5) Lines/Drains: PIV.  Deep drain 5 cc serosanguinous  6) Dispo: work with PT/OT today. Will pull drain tomorrow.  Monitor post-nguyen removal voiding as patient had some retention preop.  Hyponatremia and hypertension per medicine - appreciate assistance.    Abhijit Burnett MD  Orthopedic Surgery PGY-4  [phone] pager

## 2017-02-15 NOTE — PLAN OF CARE
Problem: Patient Care Overview  Goal: Plan of Care Review  Outcome: Ongoing (interventions implemented as appropriate)  Plan of care reviewed with patient who verbalized understanding. Mildly confused at times, easily reoriented, patient attributes this to his low sodium levels (MDs aware of both mild confusion and hyponatremia). Hypertensive all night even with IV labetalol. All other vitals stable on room air. Bonilla catheter in place, adequate amounts of clear yellow urine noted. He moved from the chair to the bed, still very weak bilateral upper and lower extremities. Stage II pressure ulcer on right shoulder with mepliex foam dressing. Barrier cream applied to sacrum suspected deep tissue injury. Numbness and tingling in both hands. Some tingling in feet but no numbness. Pain moderately controlled on current regimen, no nausea, tolerating diet but poor appetite. Patient refused SCDs/TEDs. C-collar in place, incision clean and intact, SUSIE drain to lateral neck with very scant output. HOB >30 degrees at all times.    Problem: Diabetes, Type 2 (Adult)  Intervention: Support/Optimize Psychosocial Response to Condition    02/15/17 9067   Coping/Psychosocial Interventions   Supportive Measures active listening utilized;counseling provided;decision-making supported;goal setting facilitated;positive reinforcement provided;problem solving facilitated;relaxation techniques promoted;self-care encouraged;self-responsibility promoted;verbalization of feelings encouraged;self-reflection promoted   Psychosocial Support   Family/Support System Care support provided       Intervention: Optimize Glycemic Control    02/15/17 3177   Nutrition Interventions   Glycemic Management blood glucose monitoring         Goal: Signs and Symptoms of Listed Potential Problems Will be Absent, Minimized or Managed (Diabetes, Type 2)  Signs and symptoms of listed potential problems will be absent, minimized or managed by discharge/transition of  care (reference Diabetes, Type 2 (Adult) CPG).   Outcome: Ongoing (interventions implemented as appropriate)    02/15/17 0347   Diabetes, Type 2   Problems Assessed (Type 2 Diabetes) all   Problems Present (Type 2 Diabetes) hyperglycemia         Problem: Fall Risk (Adult)  Intervention: Monitor/Assist with Self Care    02/14/17 2020   Functional Level Current   Ambulation 3 - assistive equipment and person   Transferring 2 - assistive person   Toileting 3 - assistive equipment and person   Bathing 2 - assistive person   Dressing 2 - assistive person   Eating 2 - assistive person   Communication 0 - understands/communicates without difficulty   Swallowing 0 - swallows foods/liquids without difficulty   Daily Care Interventions   Self-Care Promotion independence encouraged   Activity   Activity Assistance Provided assistance, 2 people       Intervention: Reduce Risk/Promote Restraint Free Environment    02/15/17 0347   Safety Interventions   Safety Precautions emergency equipment at bedside       Intervention: Review Medications/Identify Contributors to Fall Risk    02/15/17 0347   Safety Interventions   Medication Review/Management medications reviewed;high risk medications identified       Intervention: Patient Rounds    02/14/17 2020   Safety Interventions   Patient Rounds visualized patient;toileting offered;placement of personal items at bedside;ID band on;clutter free environment maintained;call light in reach;bed wheels locked;bed in low position       Intervention: Safety Promotion/Fall Prevention    02/14/17 1800   Safety Interventions   Safety Promotion/Fall Prevention Fall Risk reviewed with patient/family;Fall Risk signage in place;lighting adjusted;high risk medications identified;medications reviewed;muscle strengthening facilitated;nonskid shoes/slippers when out of bed;side rails raised x 2       Intervention: Safety Precautions    02/15/17 0347   Safety Interventions   Safety Precautions emergency  equipment at bedside         Goal: Identify Related Risk Factors and Signs and Symptoms  Related risk factors and signs and symptoms are identified upon initiation of Human Response Clinical Practice Guideline (CPG)   Outcome: Ongoing (interventions implemented as appropriate)    02/15/17 0347   Fall Risk   Related Risk Factors (Fall Risk) bladder function altered;age-related changes;culprit medication(s);depression/anxiety;fatigue/slow reaction;polypharmacy;gait/mobility problems;fear of falling;inadequate lighting;sensory deficits;environment unfamiliar   Signs and Symptoms (Fall Risk) presence of risk factors         Problem: Pressure Ulcer Risk (Otis Scale) (Adult,Obstetrics,Pediatric)  Intervention: Promote/Optimize Nutrition    02/15/17 0347   Nutrition Interventions   Oral Nutrition Promotion physical activity promoted       Intervention: Prevent/Manage Excess Moisture    02/15/17 0347   Hygiene Care   Bathing/Skin Care linen changed;incontinence care       Intervention: Maintain Head of Bed Elevation Less Than 30 Degrees as Tolerated    02/15/17 0347   Positioning   Head of Bed (HOB) HOB at 30-45 degrees       Intervention: Prevent/Minimize Sheer/Friction Injuries    02/15/17 0347   Skin Interventions   Pressure Reduction Devices positioning supports utilized   Pressure Reduction Techniques frequent weight shift encouraged;weight shift assistance provided;rest period provided between sit times;heels elevated off bed;positioned off wounds;pressure points protected   Positioning   Positioning/Transfer Devices wedge       Intervention: Turn/Reposition Often    02/14/17 1600 02/15/17 0347   Skin Interventions   Pressure Reduction Techniques --  frequent weight shift encouraged;weight shift assistance provided;rest period provided between sit times;heels elevated off bed;positioned off wounds;pressure points protected   Positioning   Body Position side-lying, right --          Goal: Skin Integrity  Patient will  demonstrate the desired outcomes by discharge/transition of care.   Outcome: Ongoing (interventions implemented as appropriate)    02/15/17 0347   Pressure Ulcer Risk (Otis Scale) (Adult,Obstetrics,Pediatric)   Skin Integrity making progress toward outcome         Problem: Pain, Acute (Adult)  Intervention: Monitor/Manage Analgesia    02/15/17 0347   Manage Acute Burn Pain   Bowel Intervention adequate fluid intake promoted;privacy promoted   Safety Interventions   Medication Review/Management medications reviewed       Intervention: Mutually Develop/Implement Acute Pain Management Plan    02/15/17 0347   Pain/Comfort/Sleep Interventions   Pain Management Interventions pain management plan reviewed with patient/caregiver;medication offered but refused;quiet environment facilitated;relaxation promoted;pillow support provided       Intervention: Support/Optimize Psychosocial Response to Acute Pain    02/15/17 0347   Coping/Psychosocial Interventions   Supportive Measures active listening utilized;counseling provided;decision-making supported;goal setting facilitated;positive reinforcement provided;problem solving facilitated;relaxation techniques promoted;self-care encouraged;self-responsibility promoted;verbalization of feelings encouraged;self-reflection promoted   Psychosocial Support   Diversional Activities music   Family/Support System Care support provided   Trust Relationship/Rapport care explained;choices provided;emotional support provided;questions answered;questions encouraged;empathic listening provided;reassurance provided;thoughts/feelings acknowledged         Goal: Acceptable Pain Control/Comfort Level  Patient will demonstrate the desired outcomes by discharge/transition of care.   Outcome: Ongoing (interventions implemented as appropriate)    02/15/17 0347   Pain, Acute (Adult)   Acceptable Pain Control/Comfort Level making progress toward outcome         Problem: Oncology Care  (Adult)  Intervention: Monitor/Manage Pain    02/15/17 0347   Pain/Comfort/Sleep Interventions   Pain Management Interventions pain management plan reviewed with patient/caregiver;medication offered but refused;quiet environment facilitated;relaxation promoted;pillow support provided   Manage Acute Burn Pain   Bowel Intervention adequate fluid intake promoted;privacy promoted   Safety Interventions   Medication Review/Management medications reviewed       Intervention: Support/Optimize Psychosocial Response to Cancer Treatment    02/15/17 0347   Coping/Psychosocial Interventions   Supportive Measures active listening utilized;counseling provided;decision-making supported;goal setting facilitated;positive reinforcement provided;problem solving facilitated;relaxation techniques promoted;self-care encouraged;self-responsibility promoted;verbalization of feelings encouraged;self-reflection promoted   Psychosocial Support   Family/Support System Care support provided   Trust Relationship/Rapport care explained;choices provided;emotional support provided;questions answered;questions encouraged;empathic listening provided;reassurance provided;thoughts/feelings acknowledged       Intervention: Prevent Deficiencies/Improve Nutritional Intake    02/15/17 0347   Nutrition Interventions   Oral Nutrition Promotion physical activity promoted   Hygiene Care   Oral Care oral swabbing       Intervention: Relieve Dyspnea    02/15/17 0347   Positioning   Head of Bed (HOB) HOB at 30-45 degrees   Respiratory Interventions   Airway/Ventilation Management airway patency maintained;calming measures promoted;pulmonary hygiene promoted       Intervention: Promote Energy Conservation    02/15/17 0347   Pain/Comfort/Sleep Interventions   Sleep/Rest Enhancement awakenings minimized;consistent schedule promoted;family presence promoted;noise level reduced;regular sleep/rest pattern promoted;relaxation techniques promoted         Goal: Signs and  Symptoms of Listed Potential Problems Will be Absent, Minimized or Managed (Oncology Care)  Signs and symptoms of listed potential problems will be absent, minimized or managed by discharge/transition of care (reference Oncology Care (Adult) CPG).   Outcome: Ongoing (interventions implemented as appropriate)    02/15/17 0347   Oncology Care   Problems Assessed (Oncology Care) all   Problems Present (Oncology Care) pain;fatigue         Problem: Infection, Risk/Actual (Adult)  Intervention: Manage Suspected/Actual Infection    02/15/17 0347   Safety Interventions   Infection Management aseptic technique maintained       Intervention: Prevent Infection/Maximize Resistance    02/15/17 0347   Nutrition Interventions   Glycemic Management blood glucose monitoring   Oral Nutrition Promotion physical activity promoted   Hygiene Care   Bathing/Skin Care linen changed;incontinence care   Respiratory Interventions   Airway/Ventilation Management airway patency maintained;calming measures promoted;pulmonary hygiene promoted   Pain/Comfort/Sleep Interventions   Sleep/Rest Enhancement awakenings minimized;consistent schedule promoted;family presence promoted;noise level reduced;regular sleep/rest pattern promoted;relaxation techniques promoted         Goal: Identify Related Risk Factors and Signs and Symptoms  Related risk factors and signs and symptoms are identified upon initiation of Human Response Clinical Practice Guideline (CPG)   Outcome: Ongoing (interventions implemented as appropriate)    02/15/17 0347   Infection, Risk/Actual   Related Risk Factors (Infection, Risk/Actual) chronic illness/condition;exposure to microbes;medication effects;surgery/procedure;treatment plan   Signs and Symptoms (Infection, Risk/Actual) blood glucose changes;lab value changes;mental/behavioral changes;pain;weakness       Goal: Infection Prevention/Resolution  Patient will demonstrate the desired outcomes by discharge/transition of care.    Outcome: Ongoing (interventions implemented as appropriate)    02/15/17 0347   Infection, Risk/Actual (Adult)   Infection Prevention/Resolution making progress toward outcome

## 2017-02-15 NOTE — PLAN OF CARE
ELEN informed by Eloisa at [facility] ([phone]) that they received authorization for pt's rehab stay. ELEN informed MD.    Addendum 10:10 AM  MD placed orders. ELEN spoke with pt's RN to confirm d/c plan and time. ELEN provided number for report: [id number].    ELEN set up patient transport with [place] at [facility] ([phone]) for 11am.    Tati Charles LMSW  [id number]

## 2017-02-15 NOTE — CONSULTS
Consult Note  Physical Medicine & Rehab    Consult Requested By:  Franklyn Ng MD      Admit Date:  2/9/2017  Admitting Diagnosis:  Upper extremity weakness [R29.898], Cervical myelopathy [G95.9], Metastatic cancer to bone [C79.51]    Reason for Consult/Chief Complaint:  Rehab Evaluation    SUBJECTIVE:   History of Present Illness:  Rah Puente is a 61-year-old male with PMHx of HTN, DMII with neuropathy, BPH, R hip fracture (summer 2016), chronic hyponatremia, and colon cancer s/p colectomy (3/2016) with metastatic disease to lungs and bone (found on CT 1/2017 when evaluated for worsening neck and back pain).  Patient presented to [facility] from hem/onc clinic on 2/9/17 for neck pain and progressively worsening BUE>BLE R>L weakness x 2 weeks with associated gait instability and falls.  MRI C/T/L revealed metastatic disease throughout the cervical, thoracic, and lumbosacral spine and pelvis with C5 compression/burst pathologic fracture with retropulsion into canal.  MRI brain showed ossesous calvarial mets but no acute intracranial structures.  Retroperitoneal US consistent with chronic renal disease and suggestive of liver metastasis.  Evaluated by ortho and underwent C5-C6 corpectomy with C4-7 anterior cervical fusion for myelopathy and C5 pathologic fracture on 2/13/17.  Planning for palliative radiotherapy in the future.      Current Functional Status:  Evaluated by therapy.  Bed mobility Elizabeth.  Sit to stand Elizabeth and RW and transfers Elizabeth and RW.  Ambulated 13 ft Elizabeth and RW.  UBD maxA and LBD totalA.    Functional History: Patient lives in the [facility], alone, in a single story shotgun home with 5 steps to enter.  Prior to admission, he was independent with ADLs, including driving and working, and mobility (however with decline in strength and endurance).  DME: BSC, shower chair.    Review of Systems  Constitutional: No fever or chills.  Positive malaise or fatigue.  Skin: No rashes or lesions.   Eyes: No  visual changes.  ENT: No nasal congestion, sore throat, or ear pain.  No difficulty swallowing.   Respiratory: No shortness of breath or wheezing.  No cough.  Cardiovascular: No chest pain or palpitations.  No edema.   Gl: No nausea or vomiting.  No abdominal pain.  No incontinence of bowel.  No constipation.   : No incontinence of bladder.   Musculoskeletal: Positive arthralgias.  Positive bone pain.  Positive muscle weakness.  Neurological: No seizures or tremors.  Positive weakness.  Positive sensory impairment. No headache.  Positive balance difficulty or gait problems.   Behavioral/Psych: No changes in mood or hallucinations.    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 No pertinent family history.  Social History   Substance Use Topics    Smoking status: Former Smoker     Packs/day: 1.00     Types: Cigarettes     Quit date: 12/7/2014    Smokeless tobacco: None    Alcohol use 2.4 oz/week     4 Shots of liquor per week      Comment: vodka-nightly     Review of patient's allergies indicates:  No Known Allergies     Scheduled Medications:   amlodipine  10 mg  Oral Daily    carvedilol  25 mg Oral BID    dexamethasone  8 mg Oral Q12H    famotidine  20 mg Oral BID    fentaNYL  1 patch Transdermal Q72H    gabapentin  300 mg Oral TID    insulin aspart  6 Units Subcutaneous TIDWM    insulin detemir  18 Units Subcutaneous Daily    senna-docusate 8.6-50 mg  1 tablet Oral Daily    tamsulosin  0.4 mg Oral BID     PRN Medications:  dextrose 50%, dextrose 50%, dextrose 50%, glucagon (human recombinant), glucagon (human recombinant), glucose, glucose, HYDROmorphone, insulin aspart, labetalol, methocarbamol, ondansetron, oxycodone, oxycodone, sodium chloride 0.9%    OBJECTIVE:     Vital Signs (Most Recent)  Temp: 97.5 °F (36.4 °C) (02/15/17 1503)  Pulse: 74 (02/15/17 1503)  Resp: 18 (02/15/17 1503)  BP: (!) 169/79 (02/15/17 1503)  SpO2: 97 % (02/15/17 1503)    Vital Signs Range (Last 24H):  Temp:  [96.6 °F (35.9 °C)-97.8 °F (36.6 °C)]   Pulse:  []   Resp:  [16-20]   BP: (122-196)/()   SpO2:  [95 %-99 %]     Physical Exam:  Vital signs reviewed.   General appearance:  No apparent distress.  Appears well-developed and well-nourished.   Skin:  Color and texture normal.  Warm and dry.  No jaundice.  No visible rashes or visible lesions.  HEENT:  Normocephalic and atraumatic.  No scleral icterus.  Cervical incision with dressing CDI.  C-collar intact.  No nasal discharge.   Pulmonary:  Normal respiratory effort.    Cardiac:  Normal heart rate.  Extremities: No calf tenderness.  Distal pulses intact.  No edema.    Abdomen:  Soft, non-tender and non-distended.  Musculoskeletal:  Moves all extremities spontaneously.  ROM: AROM limited by pain and weakness.      Neurologic:  AAOx3.  Follows commands.  -  Motor:  RUE: proximal 2/5, distal 3/5.  LUE: proximal 2+/5, distal 3+/5.  RLE: 4/5.  LLE: 5-/5.  -  Tone:  Normal.   -  Sensory:  Bilateral hands and feet is decreased to light touch and pin prick.   Behavioral/Psych: Calm and cooperative.    Diagnostic Results:  CT:  Reviewed  MRI: Reviewed  Labs: Reviewed    ASSESSMENT/PLAN:      Rah Puente is a 61 y.o. male with colon cancer s/p colectomy (3/2016) with metastatic disease to lungs and bone (on CT 1/2017) admitted on 2/9/2017 for worsening progressive BUE and BLE weakness R>L x 2 weeks with associated gait instability and falls.  MRI C/T/L revealed metastatic disease throughout the cervical, thoracic, and lumbosacral spine and pelvis with C5 compression/burst pathologic fracture with retropulsion into canal.  S/p C5-C6 corpectomy with C4-7 anterior cervical fusion for myelopathy and C5 pathologic fracture on 2/13/17.      -  Medical status:  C5 pathologic fracture with associated myelopathy:  S/p C5-C6 corpectomy with C4-7 fusion.  Stable.  Drain in place, plan to remove 2/16.  WBAT in c-collar.  Monitoring for urinary retention post-nguyen.  Planning for palliative radiotherapy.  Dexamethasone 8 mg Q12H.    -  Functional status:  Evaluated by therapy.  Bed mobility Elizabeth.  Sit to stand Elizabeth and RW and transfers Elizabeth and RW.  Ambulated 13 ft Elizabeth and RW.  UBD maxA and LBD totalA.  -  Pain:  Controlled.  TX: Fentanyl patch, Neurontin 300 mg TID, oxy IR PRN.    -  Skin: Per nursing- stage II (1 cm x 0.2 cm)- R shoulder and suspected DTI (3 cm x 2 cm)- sacrum.   -  Rehab goals:  Patient has good goals related to weakness, impaired endurance, impaired sensation, impaired self care skills, impaired functional mobilty, impaired balance, decreased coordination, decreased upper extremity function, decreased lower extremity function, impaired cardiopulmonary response to activity (anxiety).  -  Reviewed discharge options with patient.  We discussed the differences between inpatient rehab, SNF, home health therapy, and outpatient therapy.  I encourage him to remain motivated and to work hard with therapy.    Patient with good rehab goals.  He believes he can tolerated rehab program and is interested in [facility].  Insurance  pending approval for rehab admission.  Will follow patient's progress for recommendations.    Thank you for consult.    QIANA Colon, FNP-C    Physical Medicine & Rehabilitation   02/15/2017  Spectralink: [id number]    Collaborating Physician : Brody Savage MD

## 2017-02-15 NOTE — PROGRESS NOTES
Bonilla catheter removed per order, patient tolerated well. Due to void by 1145, patient understands. Urinal within reach. Continuing to monitor.

## 2017-02-15 NOTE — ASSESSMENT & PLAN NOTE
--Elevated bg in 200s  --Hb A1c 6.5 on Feb 2017  --Hyperglycemia likely 2/2 dexamethasone  --Increase levemir to 18 units qam  --Increase novolog to 6 units qac with low dose ssi.

## 2017-02-15 NOTE — PLAN OF CARE
ELEN spoke with MD who stated pt will need rehab v. SNF upon d/c. MD to place SNF and PM&R consult. ELEN will continue to follow.    Addendum 8:36 AM   ELEN informed by Eloisa at [facility] that pt looks appropriate. Eloisa submitted to pt's insurance and will prepare for a possible d/c to [facility] tomorrow.    Tati Charles LMSW  [phone]

## 2017-02-15 NOTE — PLAN OF CARE
Problem: Physical Therapy Goal  Goal: Physical Therapy Goal  Goals to be met by: 17     Patient will increase functional independence with mobility by performin. Supine to sit with CGA-not met  2. Sit to supine with CGA-not met  3. Sit to stand transfer with RW with CGA-not met  4. Bed to chair transfer with CGA using Rolling Walker-not met  5. Gait x 200 feet with Supervision using Rolling Walker-not met.   6. Ascend/descend 5 stair with bilateral Handrails minimal assist-not met.   7. Lower extremity exercise program x 20 reps per handout, with independence-not met.   Outcome: Ongoing (interventions implemented as appropriate)  Min. Progress noted on his ability to ambulate, but patient fatigues easily and anxiety increases.

## 2017-02-16 LAB
ALBUMIN SERPL BCP-MCNC: 2.5 G/DL
ALP SERPL-CCNC: 238 U/L
ALT SERPL W/O P-5'-P-CCNC: 29 U/L
ANION GAP SERPL CALC-SCNC: 10 MMOL/L
AST SERPL-CCNC: 43 U/L
BASOPHILS # BLD AUTO: 0.01 K/UL
BASOPHILS NFR BLD: 0.1 %
BILIRUB SERPL-MCNC: 0.5 MG/DL
BLD PROD TYP BPU: NORMAL
BLD PROD TYP BPU: NORMAL
BLOOD UNIT EXPIRATION DATE: NORMAL
BLOOD UNIT EXPIRATION DATE: NORMAL
BLOOD UNIT TYPE CODE: 5100
BLOOD UNIT TYPE CODE: 5100
BLOOD UNIT TYPE: NORMAL
BLOOD UNIT TYPE: NORMAL
BUN SERPL-MCNC: 47 MG/DL
CALCIUM SERPL-MCNC: 8.6 MG/DL
CHLORIDE SERPL-SCNC: 90 MMOL/L
CO2 SERPL-SCNC: 26 MMOL/L
CODING SYSTEM: NORMAL
CODING SYSTEM: NORMAL
CREAT SERPL-MCNC: 1.4 MG/DL
DIFFERENTIAL METHOD: ABNORMAL
DISPENSE STATUS: NORMAL
DISPENSE STATUS: NORMAL
EOSINOPHIL # BLD AUTO: 0 K/UL
EOSINOPHIL NFR BLD: 0 %
ERYTHROCYTE [DISTWIDTH] IN BLOOD BY AUTOMATED COUNT: 13.6 %
EST. GFR  (AFRICAN AMERICAN): >60 ML/MIN/1.73 M^2
EST. GFR  (NON AFRICAN AMERICAN): 53.1 ML/MIN/1.73 M^2
GLUCOSE SERPL-MCNC: 174 MG/DL
HCT VFR BLD AUTO: 30.3 %
HGB BLD-MCNC: 10.3 G/DL
LYMPHOCYTES # BLD AUTO: 0.8 K/UL
LYMPHOCYTES NFR BLD: 7 %
MAGNESIUM SERPL-MCNC: 1.5 MG/DL
MCH RBC QN AUTO: 28.7 PG
MCHC RBC AUTO-ENTMCNC: 34 %
MCV RBC AUTO: 84 FL
MONOCYTES # BLD AUTO: 0.5 K/UL
MONOCYTES NFR BLD: 5 %
NEUTROPHILS # BLD AUTO: 9.3 K/UL
NEUTROPHILS NFR BLD: 87.1 %
PHOSPHATE SERPL-MCNC: 3.3 MG/DL
PLATELET # BLD AUTO: 152 K/UL
PMV BLD AUTO: 9.1 FL
POCT GLUCOSE: 140 MG/DL (ref 70–110)
POCT GLUCOSE: 150 MG/DL (ref 70–110)
POCT GLUCOSE: 177 MG/DL (ref 70–110)
POCT GLUCOSE: 187 MG/DL (ref 70–110)
POTASSIUM SERPL-SCNC: 4.3 MMOL/L
PROT SERPL-MCNC: 6.1 G/DL
RBC # BLD AUTO: 3.59 M/UL
SODIUM SERPL-SCNC: 126 MMOL/L
TRANS ERYTHROCYTES VOL PATIENT: NORMAL ML
TRANS ERYTHROCYTES VOL PATIENT: NORMAL ML
WBC # BLD AUTO: 10.71 K/UL

## 2017-02-16 PROCEDURE — 83735 ASSAY OF MAGNESIUM: CPT

## 2017-02-16 PROCEDURE — 99231 SBSQ HOSP IP/OBS SF/LOW 25: CPT | Mod: ,,, | Performed by: INTERNAL MEDICINE

## 2017-02-16 PROCEDURE — 20600001 HC STEP DOWN PRIVATE ROOM

## 2017-02-16 PROCEDURE — 63600175 PHARM REV CODE 636 W HCPCS: Performed by: ORTHOPAEDIC SURGERY

## 2017-02-16 PROCEDURE — 63600175 PHARM REV CODE 636 W HCPCS: Performed by: INTERNAL MEDICINE

## 2017-02-16 PROCEDURE — 25000003 PHARM REV CODE 250: Performed by: ORTHOPAEDIC SURGERY

## 2017-02-16 PROCEDURE — 25000003 PHARM REV CODE 250: Performed by: STUDENT IN AN ORGANIZED HEALTH CARE EDUCATION/TRAINING PROGRAM

## 2017-02-16 PROCEDURE — 85025 COMPLETE CBC W/AUTO DIFF WBC: CPT

## 2017-02-16 PROCEDURE — 84100 ASSAY OF PHOSPHORUS: CPT

## 2017-02-16 PROCEDURE — 25000003 PHARM REV CODE 250: Performed by: INTERNAL MEDICINE

## 2017-02-16 PROCEDURE — 80053 COMPREHEN METABOLIC PANEL: CPT

## 2017-02-16 PROCEDURE — 36415 COLL VENOUS BLD VENIPUNCTURE: CPT

## 2017-02-16 PROCEDURE — 63600175 PHARM REV CODE 636 W HCPCS: Performed by: STUDENT IN AN ORGANIZED HEALTH CARE EDUCATION/TRAINING PROGRAM

## 2017-02-16 PROCEDURE — 99232 SBSQ HOSP IP/OBS MODERATE 35: CPT | Mod: ,,, | Performed by: NURSE PRACTITIONER

## 2017-02-16 RX ORDER — MAGNESIUM SULFATE HEPTAHYDRATE 40 MG/ML
2 INJECTION, SOLUTION INTRAVENOUS ONCE
Status: COMPLETED | OUTPATIENT
Start: 2017-02-16 | End: 2017-02-16

## 2017-02-16 RX ORDER — QUINAPRIL 20 MG/1
20 TABLET ORAL DAILY
Status: DISCONTINUED | OUTPATIENT
Start: 2017-02-16 | End: 2017-02-17 | Stop reason: HOSPADM

## 2017-02-16 RX ORDER — HEPARIN SODIUM 5000 [USP'U]/ML
5000 INJECTION, SOLUTION INTRAVENOUS; SUBCUTANEOUS EVERY 8 HOURS
Status: DISCONTINUED | OUTPATIENT
Start: 2017-02-16 | End: 2017-02-17 | Stop reason: HOSPADM

## 2017-02-16 RX ADMIN — TAMSULOSIN HYDROCHLORIDE 0.4 MG: 0.4 CAPSULE ORAL at 08:02

## 2017-02-16 RX ADMIN — INSULIN ASPART 6 UNITS: 100 INJECTION, SOLUTION INTRAVENOUS; SUBCUTANEOUS at 01:02

## 2017-02-16 RX ADMIN — GABAPENTIN 300 MG: 300 CAPSULE ORAL at 09:02

## 2017-02-16 RX ADMIN — HEPARIN SODIUM 5000 UNITS: 5000 INJECTION, SOLUTION INTRAVENOUS; SUBCUTANEOUS at 09:02

## 2017-02-16 RX ADMIN — HYDROMORPHONE HYDROCHLORIDE 1 MG: 1 INJECTION, SOLUTION INTRAMUSCULAR; INTRAVENOUS; SUBCUTANEOUS at 12:02

## 2017-02-16 RX ADMIN — DEXAMETHASONE 8 MG: 4 TABLET ORAL at 08:02

## 2017-02-16 RX ADMIN — OXYCODONE HYDROCHLORIDE 15 MG: 5 TABLET ORAL at 03:02

## 2017-02-16 RX ADMIN — FAMOTIDINE 20 MG: 20 TABLET, FILM COATED ORAL at 09:02

## 2017-02-16 RX ADMIN — FAMOTIDINE 20 MG: 20 TABLET, FILM COATED ORAL at 08:02

## 2017-02-16 RX ADMIN — HEPARIN SODIUM 5000 UNITS: 5000 INJECTION, SOLUTION INTRAVENOUS; SUBCUTANEOUS at 02:02

## 2017-02-16 RX ADMIN — INSULIN ASPART 6 UNITS: 100 INJECTION, SOLUTION INTRAVENOUS; SUBCUTANEOUS at 09:02

## 2017-02-16 RX ADMIN — OXYCODONE HYDROCHLORIDE 15 MG: 5 TABLET ORAL at 11:02

## 2017-02-16 RX ADMIN — GABAPENTIN 300 MG: 300 CAPSULE ORAL at 02:02

## 2017-02-16 RX ADMIN — MAGNESIUM SULFATE IN WATER 2 G: 40 INJECTION, SOLUTION INTRAVENOUS at 12:02

## 2017-02-16 RX ADMIN — AMLODIPINE BESYLATE 10 MG: 10 TABLET ORAL at 08:02

## 2017-02-16 RX ADMIN — QUINAPRIL 20 MG: 20 TABLET ORAL at 02:02

## 2017-02-16 RX ADMIN — LABETALOL HYDROCHLORIDE 20 MG: 5 INJECTION, SOLUTION INTRAVENOUS at 11:02

## 2017-02-16 RX ADMIN — INSULIN ASPART 6 UNITS: 100 INJECTION, SOLUTION INTRAVENOUS; SUBCUTANEOUS at 06:02

## 2017-02-16 RX ADMIN — OXYCODONE HYDROCHLORIDE 10 MG: 5 TABLET ORAL at 06:02

## 2017-02-16 RX ADMIN — LABETALOL HYDROCHLORIDE 20 MG: 5 INJECTION, SOLUTION INTRAVENOUS at 08:02

## 2017-02-16 RX ADMIN — STANDARDIZED SENNA CONCENTRATE AND DOCUSATE SODIUM 1 TABLET: 8.6; 5 TABLET, FILM COATED ORAL at 08:02

## 2017-02-16 RX ADMIN — GABAPENTIN 300 MG: 300 CAPSULE ORAL at 05:02

## 2017-02-16 RX ADMIN — TAMSULOSIN HYDROCHLORIDE 0.4 MG: 0.4 CAPSULE ORAL at 09:02

## 2017-02-16 RX ADMIN — CARVEDILOL 25 MG: 25 TABLET, FILM COATED ORAL at 08:02

## 2017-02-16 RX ADMIN — INSULIN DETEMIR 18 UNITS: 100 INJECTION, SOLUTION SUBCUTANEOUS at 09:02

## 2017-02-16 RX ADMIN — DEXAMETHASONE 8 MG: 4 TABLET ORAL at 09:02

## 2017-02-16 RX ADMIN — CARVEDILOL 25 MG: 25 TABLET, FILM COATED ORAL at 09:02

## 2017-02-16 NOTE — PLAN OF CARE
Problem: Patient Care Overview  Goal: Plan of Care Review  Outcome: Ongoing (interventions implemented as appropriate)  POC reviewed with pt who verbalized understanding. AAOx4. Elevated BP treated with PRN meds per MAR; VSS otherwise. Remains free of falls and injury. L neck incision with gauze intact. Neck SUSIE in place. C collar in place at all times. Pain managed with PRN meds per MAR. Voiding per urinal. Tolerating diet; no complaints of nausea per MAR. Telemetry monitored running SR. Blood glucose monitored at bedtime with no coverage needed. SCDs in place. No acute events. No distress noted. Resting well between care. Will continue to monitor.

## 2017-02-16 NOTE — SUBJECTIVE & OBJECTIVE
Interval History: No AEON. Pt reports pain is 5/10. Pt does not have any other complaints.    Review of Systems   Constitutional: Negative for chills and fever.   Respiratory: Negative for cough, shortness of breath and wheezing.    Cardiovascular: Negative for chest pain, palpitations and leg swelling.   Gastrointestinal: Negative for abdominal pain, constipation, diarrhea, nausea and vomiting.   Genitourinary: Negative for difficulty urinating and dysuria.   Musculoskeletal: Positive for neck pain (well controlled).     Objective:     Vital Signs (Most Recent):  Temp: 96.9 °F (36.1 °C) (02/16/17 0813)  Pulse: 73 (02/16/17 0900)  Resp: 18 (02/16/17 0813)  BP: (!) 176/78 (post-labatelol) (02/16/17 0828)  SpO2: 96 % (02/16/17 0813) Vital Signs (24h Range):  Temp:  [96.5 °F (35.8 °C)-98.5 °F (36.9 °C)] 96.9 °F (36.1 °C)  Pulse:  [59-82] 73  Resp:  [18] 18  SpO2:  [95 %-98 %] 96 %  BP: (122-190)/(63-88) 176/78     Weight: 77.2 kg (170 lb 3.1 oz)  Body mass index is 21.84 kg/(m^2).    Intake/Output Summary (Last 24 hours) at 02/16/17 1008  Last data filed at 02/16/17 0432   Gross per 24 hour   Intake             1230 ml   Output             1302 ml   Net              -72 ml      Physical Exam   Constitutional: He is oriented to person, place, and time. He appears well-developed and well-nourished. No distress.   Neck:   Neck brace in place   Cardiovascular: Normal rate, regular rhythm, normal heart sounds and intact distal pulses.  Exam reveals no gallop and no friction rub.    No murmur heard.  Pulmonary/Chest: Effort normal and breath sounds normal. No respiratory distress. He has no wheezes. He has no rales.   Abdominal: Soft. Bowel sounds are normal. He exhibits no distension. There is no tenderness.   Neurological: He is alert and oriented to person, place, and time.       Significant Labs:   CBC:   Recent Labs  Lab 02/15/17  0556 02/16/17  0425   WBC 9.84 10.71   HGB 11.3* 10.3*   HCT 33.3* 30.3*    152      CMP:   Recent Labs  Lab 02/15/17  0556 02/16/17  0425   * 126*   K 4.5 4.3   CL 90* 90*   CO2 29 26   * 174*   BUN 35* 47*   CREATININE 1.4 1.4   CALCIUM 9.1 8.6*   PROT 6.3 6.1   ALBUMIN 2.5* 2.5*   BILITOT 0.3 0.5   ALKPHOS 253* 238*   AST 32 43*   ALT 19 29   ANIONGAP 9 10   EGFRNONAA 53.1* 53.1*       Significant Imaging: I have reviewed all pertinent imaging results/findings within the past 24 hours.

## 2017-02-16 NOTE — PT/OT/SLP PROGRESS
Physical Therapy      Rah Puente  MRN: [id number]    Patient not seen today secondary to Patient leaving for testing in AM and unable to re attempt in PM. Will follow-up next scheduled treatment per PT POC.    Harpreet Bautista, PTA   2/16/2017

## 2017-02-16 NOTE — PROGRESS NOTES
[facility] Hospital Medicine  Progress Note    Patient Name: Rah Puente  MRN: [id number]  Patient Class: IP- Inpatient   Admission Date: 2/9/2017  Length of Stay: 7 days  Attending Physician: Franklyn Ng MD  Primary Care Provider: Brooks Gilbert MD    [facility] Medicine Team: Networked reference to record PCT  Mariya Hillman MD    Subjective:     Principal Problem:Upper extremity weakness    HPI:  61 year old male with PMHx T2DM, HTN, HLD, stage I colon Ca who prsented to hospital with neck pain and UE weakness. CT imaging was found to be concerning for mets. Pt was initially admitted to the hem/onc service, but is now on the orthopedic service s/p C5-6 corpectomy with C4-7 anterior cervical fusion. Medicine was consulted for co-management of his medical issues. This afternoon, pt is not reporting much pain. He denies any HA, chest pain, vision changes, shortness of breath, palpitations, abdominal pain, N/V or lower extremity swelling.    Hospital Course:       Interval History: No AEON. Pt reports pain is 5/10. Pt does not have any other complaints.    Review of Systems   Constitutional: Negative for chills and fever.   Respiratory: Negative for cough, shortness of breath and wheezing.    Cardiovascular: Negative for chest pain, palpitations and leg swelling.   Gastrointestinal: Negative for abdominal pain, constipation, diarrhea, nausea and vomiting.   Genitourinary: Negative for difficulty urinating and dysuria.   Musculoskeletal: Positive for neck pain (well controlled).     Objective:     Vital Signs (Most Recent):  Temp: 96.9 °F (36.1 °C) (02/16/17 0813)  Pulse: 73 (02/16/17 0900)  Resp: 18 (02/16/17 0813)  BP: (!) 176/78 (post-labatelol) (02/16/17 0828)  SpO2: 96 % (02/16/17 0813) Vital Signs (24h Range):  Temp:  [96.5 °F (35.8 °C)-98.5 °F (36.9 °C)] 96.9 °F (36.1 °C)  Pulse:  [59-82] 73  Resp:  [18] 18  SpO2:  [95 %-98 %] 96 %  BP: (122-190)/(63-88) 176/78     Weight: 77.2  kg (170 lb 3.1 oz)  Body mass index is 21.84 kg/(m^2).    Intake/Output Summary (Last 24 hours) at 02/16/17 1008  Last data filed at 02/16/17 0432   Gross per 24 hour   Intake             1230 ml   Output             1302 ml   Net              -72 ml      Physical Exam   Constitutional: He is oriented to person, place, and time. He appears well-developed and well-nourished. No distress.   Neck:   Neck brace in place   Cardiovascular: Normal rate, regular rhythm, normal heart sounds and intact distal pulses.  Exam reveals no gallop and no friction rub.    No murmur heard.  Pulmonary/Chest: Effort normal and breath sounds normal. No respiratory distress. He has no wheezes. He has no rales.   Abdominal: Soft. Bowel sounds are normal. He exhibits no distension. There is no tenderness.   Neurological: He is alert and oriented to person, place, and time.       Significant Labs:   CBC:   Recent Labs  Lab 02/15/17  0556 02/16/17  0425   WBC 9.84 10.71   HGB 11.3* 10.3*   HCT 33.3* 30.3*    152     CMP:   Recent Labs  Lab 02/15/17  0556 02/16/17  0425   * 126*   K 4.5 4.3   CL 90* 90*   CO2 29 26   * 174*   BUN 35* 47*   CREATININE 1.4 1.4   CALCIUM 9.1 8.6*   PROT 6.3 6.1   ALBUMIN 2.5* 2.5*   BILITOT 0.3 0.5   ALKPHOS 253* 238*   AST 32 43*   ALT 19 29   ANIONGAP 9 10   EGFRNONAA 53.1* 53.1*       Significant Imaging: I have reviewed all pertinent imaging results/findings within the past 24 hours.    Assessment/Plan:      Type 2 diabetes mellitus with diabetic polyneuropathy, without long-term current use of insulin  --BG now under better control.  --Hb A1c 6.5 on Feb 2017  --Hyperglycemia likely 2/2 dexamethasone  --Continue levemir to 18 units qam  --Continue novolog to 6 units qac with low dose ssi.      Chronic hyponatremia  --Initially 122 on admission. Believed to be 2/2 hypovolemic hyponatremia. Pt started on fluids (discontinued on 2/14)  --126 today.  --Trend closely.        Essential  hypertension  --Pt continues to have elevated BP requiring labetalol pushes  --Pt asymptomatic  --Continue [id number] mg qd and coreg 25 bid.   --Add back home ACEI today.  --Pain control per primary service  --Continue prn labetalol IV 20 mg for sbp>180      Malignant neoplasm metastatic to bone  --Colon cancer with mets to spine  --s/p corpectomy and spinal fusion  --POD #3  --Management per primary team      AGUILAR (acute kidney injury)  --Baseline Cr 1.0  --Cr stable today at 1.4  --Peaked at 3.4  --Avoid nephrotoxic agents  --Renally dose medications  --Monitor I/O      Benign non-nodular prostatic hyperplasia without lower urinary tract symptoms  --Continue home tamsulosin      VTE Risk Mitigation         Ordered     Medium Risk of VTE  Once      02/13/17 1859     Place sequential compression device  Until discontinued      02/13/17 1859     Place DARIO hose  Until discontinued      02/13/17 1859          Mariya Hillman MD  Department of Hospital Medicine   [facility]

## 2017-02-16 NOTE — PROGRESS NOTES
Ortho/Spine Postop Progress Note    Postop day: 3    ID: The patient is a 61 y.o. male status post: pending C5-C6 corpectomy with C4-7 anterior cervical fusion for myelopathy    Overnight Events: no acute events.  Pain controlled.  +flatus.  + urination    Vitals:    02/16/17 1100   BP:    Pulse: 80   Resp:    Temp:        Drain Output  02/15 0701 - 02/16 0700  In: 1230   Out: 1302     Physical Exam:  NAD, A/O x 3.  Wound c/d/i with clean dressing.  Biceps, Triceps, brachioradialis, wrist flexion/extension, finger abduction 4/5 bilaterally.  sensation intact but decreased bilaterally  Left foot numbness.  4+/5 ankle df/pf.  LEFT EHL 3/5    Assessment: The patient is a 61 y.o. male status post: day of surgery for C5 possible C6 corpectomy for metastatic C5 burst fracture    Plan:  1) Antibiotics: ancef postop  2) Weight bearing status: wbat in c collar  3) Labs: H/H reviewed  4) DVT Prophylaxis: mechanical.  Start heparin 5k q8.  5) Lines/Drains: PIV.  Deep drain 4 cc serosanguinous - d/c'd today  6) Dispo: strength improving but still with weakness.  to Rehab likely tomorrow. xrays today.  Hyponatremia and hypertension per medicine - appreciate assistance.    Abhijit Burnett MD  Orthopedic Surgery PGY-4  [phone] pager

## 2017-02-16 NOTE — PROGRESS NOTES
Consult Note  Physical Medicine & Rehab    Consult Requested By:  Franklyn Ng MD      Admit Date:  2/9/2017  Admitting Diagnosis:  Upper extremity weakness [R29.898], Cervical myelopathy [G95.9], Metastatic cancer to bone [C79.51]    Reason for Consult/Chief Complaint:  Rehab Evaluation    SUBJECTIVE:   Interval History (02/16/2017):  Patient is seen for follow-up rehab evaluation and recommendations.  No acute events over night.  Medicine following for HTN and hyponatremia (chronic). Pain controlled.  Dressing changed and drain removed this morning.  Sat OOB in chair x 6 hours yesterday.  Functional status:  Bed mobility Elizabeth.  Sit to stand Elizabeth and RW and transfers Elizabeth and RW.  Ambulated 13 ft Elizabeth and RW.  UBD maxA and LBD totalA.  Barriers for discharge/rehab admission: insurance approved rehab, transfer to [facility] when ready for discharge    HPI, Past Medical, Surgical, Family, and Social History remains the same as documented in the initial encounter.    Review of Systems  Constitutional: No fever or chills.  Positive malaise or fatigue.  Skin: No rashes or lesions.   Eyes: No visual changes.  ENT: No nasal congestion, sore throat, or ear pain.  No difficulty swallowing.   Respiratory: No shortness of breath or wheezing.  No cough.  Cardiovascular: No chest pain or palpitations.  No edema.   Gl: No nausea or vomiting.  No abdominal pain.  No incontinence of bowel.  No constipation.   : No incontinence of bladder.   Musculoskeletal: Positive arthralgias.  Positive bone pain.  Positive muscle weakness.  Neurological: No seizures or tremors.  Positive weakness.  Positive sensory impairment. No headache.  Positive balance difficulty or gait problems.   Behavioral/Psych: No changes in mood or hallucinations.    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 No pertinent family history.  Social History   Substance Use Topics    Smoking status: Former Smoker     Packs/day: 1.00     Types: Cigarettes     Quit date: 12/7/2014    Smokeless tobacco: None    Alcohol use 2.4 oz/week     4 Shots of liquor per week      Comment: vodka-nightly     Review of patient's allergies indicates:  No Known Allergies     Scheduled Medications:   amlodipine  10 mg Oral Daily    carvedilol  25 mg Oral BID    dexamethasone  8 mg Oral Q12H    famotidine  20 mg Oral BID    fentaNYL  1 patch Transdermal Q72H    gabapentin  300 mg Oral TID    insulin aspart  6 Units Subcutaneous TIDWM    insulin detemir  18 Units Subcutaneous Daily    senna-docusate 8.6-50 mg  1 tablet Oral Daily    tamsulosin  0.4 mg Oral BID     PRN Medications:  dextrose 50%, dextrose 50%, dextrose 50%, glucagon (human recombinant), glucagon (human recombinant), glucose, glucose, HYDROmorphone, insulin aspart, labetalol, methocarbamol, ondansetron, oxycodone, oxycodone, sodium chloride 0.9%    OBJECTIVE:     Vital Signs (Most Recent)  Temp: 96.9 °F (36.1 °C) (02/16/17 0813)  Pulse: 71 (02/16/17 0828)  Resp: 18 (02/16/17 0813)  BP: (!) 176/78 (post-labatelol) (02/16/17 0828)  SpO2: 96 % (02/16/17  0813)    Vital Signs Range (Last 24H):  Temp:  [96.5 °F (35.8 °C)-98.5 °F (36.9 °C)]   Pulse:  [59-82]   Resp:  [18]   BP: (122-190)/(63-88)   SpO2:  [95 %-98 %]     Physical Exam:  Vital signs reviewed.   General appearance:  No apparent distress.  Appears well-developed and well-nourished.   Skin:  Color and texture normal.  Warm and dry.  No jaundice.  No visible rashes or visible lesions.  HEENT:  Normocephalic and atraumatic.  No scleral icterus.  Cervical incision with dressing CDI.  C-collar intact.  No nasal discharge.   Pulmonary:  Normal respiratory effort.    Cardiac:  Normal heart rate.  Extremities: No calf tenderness.  Distal pulses intact.  No edema.    Abdomen:  Soft, non-tender and non-distended.  Musculoskeletal:  Moves all extremities spontaneously.  ROM: AROM limited by pain and weakness.      Neurologic:  AAOx3.  Follows commands.  -  Motor:  RUE: proximal 2/5, distal 3/5.  LUE: proximal 2+/5, distal 3+/5.  RLE: 4/5.  LLE: 5-/5.  -  Tone:  Normal.   -  Sensory:  Bilateral hands and feet is decreased to light touch and pin prick.   Behavioral/Psych: Calm and cooperative.    Diagnostic Results:  CT: Reviewed  MRI: Reviewed  Labs: Reviewed    ASSESSMENT/PLAN:      Rah Puente is a 61 y.o. male with colon cancer s/p colectomy (3/2016) with metastatic disease to lungs and bone (on CT 1/2017) admitted on 2/9/2017 for worsening progressive BUE and BLE weakness R>L x 2 weeks with associated gait instability and falls.  MRI C/T/L revealed metastatic disease throughout the cervical, thoracic, and lumbosacral spine and pelvis with C5 compression/burst pathologic fracture with retropulsion into canal.  S/p C5-C6 corpectomy with C4-7 anterior cervical fusion for myelopathy and C5 pathologic fracture on 2/13/17.      -  Medical status:  C5 pathologic fracture with associated myelopathy:  S/p C5-C6 corpectomy with C4-7 fusion.  Stable.  Drain removed 2/16.  WBAT in c-collar.  Monitoring for urinary  retention post-nguyen.  Planning for palliative radiotherapy.  Dexamethasone 8 mg Q12H.   -  Functional status:  Sat OOB in chair x 6 hours 2/15/17.  Bed mobility Elizabeth.  Sit to stand Elizabeth and RW and transfers Elizabeth and RW.  Ambulated 13 ft Elizabeth and RW.  UBD maxA and LBD totalA.  -  Pain:  Controlled.  TX: Fentanyl patch, Neurontin 300 mg TID, oxy IR PRN.    -  Skin:  Per nursing- stage II (1 cm x 0.2 cm)- R shoulder and suspected DTI (3 cm x 2 cm)- sacrum.   -  Rehab goals:  Patient has good goals related to weakness, impaired endurance, impaired sensation, impaired self care skills, impaired functional mobilty, impaired balance, decreased coordination, decreased upper extremity function, decreased lower extremity function, impaired cardiopulmonary response to activity (anxiety).  -  Reviewed discharge options with patient.  We discussed the differences between inpatient rehab, SNF, home health therapy, and outpatient therapy.  I encourage him to remain motivated and to work hard with therapy.    Patient approved for [facility].  Insurance approved admission.  Transfer to rehab when ready for discharge.    Thank you for consult.    QIANA Colon, FNP-C    Physical Medicine & Rehabilitation   02/16/2017  Spectralink: [phone]    Collaborating Physician : Brody Savage MD

## 2017-02-16 NOTE — ASSESSMENT & PLAN NOTE
--Initially 122 on admission. Believed to be 2/2 hypovolemic hyponatremia. Pt started on fluids (discontinued on 2/14)  --126 today.  --Trend closely.

## 2017-02-16 NOTE — ASSESSMENT & PLAN NOTE
--Pt continues to have elevated BP requiring labetalol pushes  --Pt asymptomatic  --Continue qstxide10 mg qd and coreg 25 bid.   --Add back home ACEI today.  --Pain control per primary service  --Continue prn labetalol IV 20 mg for sbp>180

## 2017-02-16 NOTE — PLAN OF CARE
ELEN spoke with Eloisa at [facility] and informed that pt may not be ready for d/c today. ELEN will speak with MD and keep Eloisa updated on discharge date.    Tati Charles LMSW  [id number]

## 2017-02-16 NOTE — ASSESSMENT & PLAN NOTE
--Colon cancer with mets to spine  --s/p corpectomy and spinal fusion  --POD #3  --Management per primary team

## 2017-02-16 NOTE — ASSESSMENT & PLAN NOTE
--BG now under better control.  --Hb A1c 6.5 on Feb 2017  --Hyperglycemia likely 2/2 dexamethasone  --Continue levemir to 18 units qam  --Continue novolog to 6 units qac with low dose ssi.

## 2017-02-16 NOTE — ASSESSMENT & PLAN NOTE
--Baseline Cr 1.0  --Cr stable today at 1.4  --Peaked at 3.4  --Avoid nephrotoxic agents  --Renally dose medications  --Monitor I/O

## 2017-02-16 NOTE — PLAN OF CARE
Problem: Patient Care Overview  Goal: Plan of Care Review  Outcome: Ongoing (interventions implemented as appropriate)  POC reviewed with patient. VSS, BP controlled with PRN and scheduled medications. Patient complained of pain, treated with PRN medications. Patient tolerating diet, denied nausea. Accuchecks completed and covered as needed. Patient refusing to be repositioned with wedge. Patient remained free from falls and trauma. Call light in reach. [facility].

## 2017-02-17 ENCOUNTER — HOSPITAL ENCOUNTER (INPATIENT)
Facility: HOSPITAL | Age: 62
LOS: 15 days | Discharge: HOSPICE | DRG: 949 | End: 2017-03-04
Attending: PHYSICAL MEDICINE & REHABILITATION | Admitting: PHYSICAL MEDICINE & REHABILITATION
Payer: COMMERCIAL

## 2017-02-17 VITALS
DIASTOLIC BLOOD PRESSURE: 64 MMHG | HEART RATE: 71 BPM | WEIGHT: 170.19 LBS | TEMPERATURE: 96 F | SYSTOLIC BLOOD PRESSURE: 135 MMHG | OXYGEN SATURATION: 96 % | BODY MASS INDEX: 21.84 KG/M2 | HEIGHT: 74 IN | RESPIRATION RATE: 16 BRPM

## 2017-02-17 DIAGNOSIS — C79.51 METASTATIC CANCER TO BONE: ICD-10-CM

## 2017-02-17 DIAGNOSIS — L03.116 CELLULITIS OF FOOT, LEFT: ICD-10-CM

## 2017-02-17 DIAGNOSIS — L02.419 CELLULITIS AND ABSCESS OF LEG: ICD-10-CM

## 2017-02-17 DIAGNOSIS — I10 ESSENTIAL HYPERTENSION: ICD-10-CM

## 2017-02-17 DIAGNOSIS — F10.10 ALCOHOL ABUSE: ICD-10-CM

## 2017-02-17 DIAGNOSIS — T14.8XXA HEMATOMA: ICD-10-CM

## 2017-02-17 DIAGNOSIS — E11.9 CONTROLLED TYPE 2 DIABETES MELLITUS WITHOUT COMPLICATION, WITHOUT LONG-TERM CURRENT USE OF INSULIN: ICD-10-CM

## 2017-02-17 DIAGNOSIS — K56.1 INTUSSUSCEPTION OF COLON: ICD-10-CM

## 2017-02-17 DIAGNOSIS — M54.12 CERVICAL RADICULOPATHY: ICD-10-CM

## 2017-02-17 DIAGNOSIS — G95.9 CERVICAL MYELOPATHY: Primary | ICD-10-CM

## 2017-02-17 DIAGNOSIS — R29.898 UPPER EXTREMITY WEAKNESS: ICD-10-CM

## 2017-02-17 DIAGNOSIS — L03.119 CELLULITIS AND ABSCESS OF LEG: ICD-10-CM

## 2017-02-17 DIAGNOSIS — Z85.038 HISTORY OF COLON CANCER, STAGE I: ICD-10-CM

## 2017-02-17 DIAGNOSIS — D37.4 VILLOUS ADENOMA OF RIGHT COLON: ICD-10-CM

## 2017-02-17 DIAGNOSIS — E87.1 HYPONATREMIA: ICD-10-CM

## 2017-02-17 DIAGNOSIS — T14.90XA TRAUMA: ICD-10-CM

## 2017-02-17 PROBLEM — M79.2 NEUROPATHIC PAIN: Status: ACTIVE | Noted: 2017-02-17

## 2017-02-17 PROBLEM — N18.2 CKD (CHRONIC KIDNEY DISEASE) STAGE 2, GFR 60-89 ML/MIN: Status: ACTIVE | Noted: 2017-02-17

## 2017-02-17 LAB
ALBUMIN SERPL BCP-MCNC: 2.5 G/DL
ALP SERPL-CCNC: 230 U/L
ALT SERPL W/O P-5'-P-CCNC: 34 U/L
ANION GAP SERPL CALC-SCNC: 11 MMOL/L
AST SERPL-CCNC: 47 U/L
BACTERIA SPEC AEROBE CULT: NO GROWTH
BASOPHILS # BLD AUTO: 0 K/UL
BASOPHILS NFR BLD: 0 %
BILIRUB SERPL-MCNC: 0.6 MG/DL
BUN SERPL-MCNC: 51 MG/DL
CALCIUM SERPL-MCNC: 8.8 MG/DL
CHLORIDE SERPL-SCNC: 90 MMOL/L
CO2 SERPL-SCNC: 25 MMOL/L
CREAT SERPL-MCNC: 1.5 MG/DL
DIFFERENTIAL METHOD: ABNORMAL
EOSINOPHIL # BLD AUTO: 0 K/UL
EOSINOPHIL NFR BLD: 0 %
ERYTHROCYTE [DISTWIDTH] IN BLOOD BY AUTOMATED COUNT: 13.7 %
EST. GFR  (AFRICAN AMERICAN): 56.5 ML/MIN/1.73 M^2
EST. GFR  (NON AFRICAN AMERICAN): 48.8 ML/MIN/1.73 M^2
GLUCOSE SERPL-MCNC: 182 MG/DL
HCT VFR BLD AUTO: 29.4 %
HGB BLD-MCNC: 10 G/DL
LYMPHOCYTES # BLD AUTO: 0.7 K/UL
LYMPHOCYTES NFR BLD: 6.9 %
MAGNESIUM SERPL-MCNC: 1.9 MG/DL
MCH RBC QN AUTO: 29 PG
MCHC RBC AUTO-ENTMCNC: 34 %
MCV RBC AUTO: 85 FL
MONOCYTES # BLD AUTO: 0.6 K/UL
MONOCYTES NFR BLD: 6 %
NEUTROPHILS # BLD AUTO: 8.6 K/UL
NEUTROPHILS NFR BLD: 86.2 %
PHOSPHATE SERPL-MCNC: 3.5 MG/DL
PLATELET # BLD AUTO: 144 K/UL
PMV BLD AUTO: 9.6 FL
POCT GLUCOSE: 133 MG/DL (ref 70–110)
POCT GLUCOSE: 179 MG/DL (ref 70–110)
POCT GLUCOSE: 217 MG/DL (ref 70–110)
POCT GLUCOSE: 236 MG/DL (ref 70–110)
POTASSIUM SERPL-SCNC: 4.6 MMOL/L
PROT SERPL-MCNC: 6 G/DL
RBC # BLD AUTO: 3.45 M/UL
SODIUM SERPL-SCNC: 126 MMOL/L
WBC # BLD AUTO: 9.93 K/UL

## 2017-02-17 PROCEDURE — 36415 COLL VENOUS BLD VENIPUNCTURE: CPT

## 2017-02-17 PROCEDURE — 63600175 PHARM REV CODE 636 W HCPCS: Performed by: STUDENT IN AN ORGANIZED HEALTH CARE EDUCATION/TRAINING PROGRAM

## 2017-02-17 PROCEDURE — 85025 COMPLETE CBC W/AUTO DIFF WBC: CPT

## 2017-02-17 PROCEDURE — 12800000 HC REHAB SEMI-PRIVATE ROOM

## 2017-02-17 PROCEDURE — 25000003 PHARM REV CODE 250: Performed by: ORTHOPAEDIC SURGERY

## 2017-02-17 PROCEDURE — 25000003 PHARM REV CODE 250: Performed by: INTERNAL MEDICINE

## 2017-02-17 PROCEDURE — 25000003 PHARM REV CODE 250: Performed by: STUDENT IN AN ORGANIZED HEALTH CARE EDUCATION/TRAINING PROGRAM

## 2017-02-17 PROCEDURE — 99231 SBSQ HOSP IP/OBS SF/LOW 25: CPT | Mod: ,,, | Performed by: INTERNAL MEDICINE

## 2017-02-17 PROCEDURE — 83735 ASSAY OF MAGNESIUM: CPT

## 2017-02-17 PROCEDURE — 63600175 PHARM REV CODE 636 W HCPCS: Performed by: ORTHOPAEDIC SURGERY

## 2017-02-17 PROCEDURE — 25000003 PHARM REV CODE 250: Performed by: PHYSICAL MEDICINE & REHABILITATION

## 2017-02-17 PROCEDURE — 84100 ASSAY OF PHOSPHORUS: CPT

## 2017-02-17 PROCEDURE — 99223 1ST HOSP IP/OBS HIGH 75: CPT | Mod: ,,, | Performed by: PHYSICAL MEDICINE & REHABILITATION

## 2017-02-17 PROCEDURE — 97530 THERAPEUTIC ACTIVITIES: CPT

## 2017-02-17 PROCEDURE — 97116 GAIT TRAINING THERAPY: CPT

## 2017-02-17 PROCEDURE — 80053 COMPREHEN METABOLIC PANEL: CPT

## 2017-02-17 RX ORDER — INSULIN ASPART 100 [IU]/ML
6 INJECTION, SOLUTION INTRAVENOUS; SUBCUTANEOUS
Status: DISCONTINUED | OUTPATIENT
Start: 2017-02-17 | End: 2017-02-20

## 2017-02-17 RX ORDER — CARVEDILOL 25 MG/1
25 TABLET ORAL 2 TIMES DAILY
Status: DISCONTINUED | OUTPATIENT
Start: 2017-02-17 | End: 2017-03-04 | Stop reason: HOSPADM

## 2017-02-17 RX ORDER — GABAPENTIN 300 MG/1
300 CAPSULE ORAL 3 TIMES DAILY
Status: CANCELLED | OUTPATIENT
Start: 2017-02-17

## 2017-02-17 RX ORDER — INSULIN ASPART 100 [IU]/ML
0-5 INJECTION, SOLUTION INTRAVENOUS; SUBCUTANEOUS EVERY 6 HOURS PRN
Status: DISCONTINUED | OUTPATIENT
Start: 2017-02-17 | End: 2017-03-04 | Stop reason: HOSPADM

## 2017-02-17 RX ORDER — OXYCODONE HCL 10 MG/1
10 TABLET, FILM COATED, EXTENDED RELEASE ORAL NIGHTLY
Status: DISCONTINUED | OUTPATIENT
Start: 2017-02-17 | End: 2017-02-21

## 2017-02-17 RX ORDER — METHOCARBAMOL 750 MG/1
750 TABLET, FILM COATED ORAL 3 TIMES DAILY PRN
Status: DISCONTINUED | OUTPATIENT
Start: 2017-02-17 | End: 2017-03-04 | Stop reason: HOSPADM

## 2017-02-17 RX ORDER — GLUCAGON 1 MG
1 KIT INJECTION
Status: CANCELLED | OUTPATIENT
Start: 2017-02-17

## 2017-02-17 RX ORDER — MAG HYDROX/ALUMINUM HYD/SIMETH 200-200-20
15 SUSPENSION, ORAL (FINAL DOSE FORM) ORAL EVERY 6 HOURS PRN
Status: CANCELLED | OUTPATIENT
Start: 2017-02-17

## 2017-02-17 RX ORDER — GLUCAGON 1 MG
1 KIT INJECTION
Status: DISCONTINUED | OUTPATIENT
Start: 2017-02-17 | End: 2017-03-04 | Stop reason: HOSPADM

## 2017-02-17 RX ORDER — GABAPENTIN 300 MG/1
300 CAPSULE ORAL 3 TIMES DAILY
Status: DISCONTINUED | OUTPATIENT
Start: 2017-02-17 | End: 2017-03-04 | Stop reason: HOSPADM

## 2017-02-17 RX ORDER — SODIUM CHLORIDE 0.9 % (FLUSH) 0.9 %
3 SYRINGE (ML) INJECTION
Status: CANCELLED | OUTPATIENT
Start: 2017-02-17

## 2017-02-17 RX ORDER — IBUPROFEN 200 MG
24 TABLET ORAL
Status: DISCONTINUED | OUTPATIENT
Start: 2017-02-17 | End: 2017-03-04 | Stop reason: HOSPADM

## 2017-02-17 RX ORDER — CARVEDILOL 25 MG/1
25 TABLET ORAL 2 TIMES DAILY
Status: CANCELLED | OUTPATIENT
Start: 2017-02-17

## 2017-02-17 RX ORDER — IBUPROFEN 200 MG
16 TABLET ORAL
Status: DISCONTINUED | OUTPATIENT
Start: 2017-02-17 | End: 2017-03-04 | Stop reason: HOSPADM

## 2017-02-17 RX ORDER — HEPARIN SODIUM 5000 [USP'U]/ML
5000 INJECTION, SOLUTION INTRAVENOUS; SUBCUTANEOUS EVERY 8 HOURS
Status: DISCONTINUED | OUTPATIENT
Start: 2017-02-17 | End: 2017-03-04 | Stop reason: HOSPADM

## 2017-02-17 RX ORDER — TAMSULOSIN HYDROCHLORIDE 0.4 MG/1
0.4 CAPSULE ORAL 2 TIMES DAILY
Status: CANCELLED | OUTPATIENT
Start: 2017-02-17

## 2017-02-17 RX ORDER — BISACODYL 10 MG
10 SUPPOSITORY, RECTAL RECTAL DAILY PRN
Status: DISCONTINUED | OUTPATIENT
Start: 2017-02-17 | End: 2017-03-04 | Stop reason: HOSPADM

## 2017-02-17 RX ORDER — IBUPROFEN 200 MG
16 TABLET ORAL
Status: CANCELLED | OUTPATIENT
Start: 2017-02-17

## 2017-02-17 RX ORDER — QUINAPRIL 20 MG/1
20 TABLET ORAL DAILY
Status: DISCONTINUED | OUTPATIENT
Start: 2017-02-18 | End: 2017-03-04 | Stop reason: HOSPADM

## 2017-02-17 RX ORDER — AMOXICILLIN 250 MG
1 CAPSULE ORAL DAILY
Status: CANCELLED | OUTPATIENT
Start: 2017-02-18

## 2017-02-17 RX ORDER — ONDANSETRON 8 MG/1
8 TABLET, ORALLY DISINTEGRATING ORAL EVERY 8 HOURS PRN
Status: CANCELLED | OUTPATIENT
Start: 2017-02-17

## 2017-02-17 RX ORDER — INSULIN ASPART 100 [IU]/ML
6 INJECTION, SOLUTION INTRAVENOUS; SUBCUTANEOUS
Status: CANCELLED | OUTPATIENT
Start: 2017-02-17

## 2017-02-17 RX ORDER — POLYETHYLENE GLYCOL 3350 17 G/17G
17 POWDER, FOR SOLUTION ORAL EVERY 12 HOURS PRN
Status: DISCONTINUED | OUTPATIENT
Start: 2017-02-17 | End: 2017-03-04 | Stop reason: HOSPADM

## 2017-02-17 RX ORDER — ADHESIVE BANDAGE
30 BANDAGE TOPICAL DAILY PRN
Status: DISCONTINUED | OUTPATIENT
Start: 2017-02-17 | End: 2017-03-04 | Stop reason: HOSPADM

## 2017-02-17 RX ORDER — OXYCODONE HYDROCHLORIDE 5 MG/1
15 TABLET ORAL EVERY 4 HOURS PRN
Status: CANCELLED | OUTPATIENT
Start: 2017-02-17

## 2017-02-17 RX ORDER — ONDANSETRON 8 MG/1
8 TABLET, ORALLY DISINTEGRATING ORAL EVERY 8 HOURS PRN
Status: DISCONTINUED | OUTPATIENT
Start: 2017-02-17 | End: 2017-03-04 | Stop reason: HOSPADM

## 2017-02-17 RX ORDER — TAMSULOSIN HYDROCHLORIDE 0.4 MG/1
0.4 CAPSULE ORAL 2 TIMES DAILY
Status: DISCONTINUED | OUTPATIENT
Start: 2017-02-17 | End: 2017-03-04 | Stop reason: HOSPADM

## 2017-02-17 RX ORDER — FAMOTIDINE 20 MG/1
20 TABLET, FILM COATED ORAL 2 TIMES DAILY
Status: CANCELLED | OUTPATIENT
Start: 2017-02-17

## 2017-02-17 RX ORDER — DIPHENOXYLATE HYDROCHLORIDE AND ATROPINE SULFATE 2.5; .025 MG/1; MG/1
2 TABLET ORAL
Status: DISCONTINUED | OUTPATIENT
Start: 2017-02-17 | End: 2017-03-04 | Stop reason: HOSPADM

## 2017-02-17 RX ORDER — AMLODIPINE BESYLATE 10 MG/1
10 TABLET ORAL DAILY
Status: DISCONTINUED | OUTPATIENT
Start: 2017-02-18 | End: 2017-03-04 | Stop reason: HOSPADM

## 2017-02-17 RX ORDER — HYDROMORPHONE HYDROCHLORIDE 1 MG/ML
1 INJECTION, SOLUTION INTRAMUSCULAR; INTRAVENOUS; SUBCUTANEOUS
Status: CANCELLED | OUTPATIENT
Start: 2017-02-17

## 2017-02-17 RX ORDER — OXYCODONE HYDROCHLORIDE 5 MG/1
10 TABLET ORAL EVERY 4 HOURS PRN
Status: CANCELLED | OUTPATIENT
Start: 2017-02-17

## 2017-02-17 RX ORDER — OXYCODONE HYDROCHLORIDE 5 MG/1
15 TABLET ORAL EVERY 4 HOURS PRN
Status: DISCONTINUED | OUTPATIENT
Start: 2017-02-17 | End: 2017-02-23

## 2017-02-17 RX ORDER — AMOXICILLIN 250 MG
1 CAPSULE ORAL DAILY
Status: DISCONTINUED | OUTPATIENT
Start: 2017-02-18 | End: 2017-03-04 | Stop reason: HOSPADM

## 2017-02-17 RX ORDER — LABETALOL HYDROCHLORIDE 5 MG/ML
20 INJECTION, SOLUTION INTRAVENOUS EVERY 4 HOURS PRN
Status: CANCELLED | OUTPATIENT
Start: 2017-02-17

## 2017-02-17 RX ORDER — ACETAMINOPHEN 325 MG/1
650 TABLET ORAL EVERY 4 HOURS PRN
Status: DISCONTINUED | OUTPATIENT
Start: 2017-02-17 | End: 2017-03-04 | Stop reason: HOSPADM

## 2017-02-17 RX ORDER — IBUPROFEN 200 MG
24 TABLET ORAL
Status: CANCELLED | OUTPATIENT
Start: 2017-02-17

## 2017-02-17 RX ORDER — FAMOTIDINE 20 MG/1
20 TABLET, FILM COATED ORAL 2 TIMES DAILY
Status: DISCONTINUED | OUTPATIENT
Start: 2017-02-17 | End: 2017-03-04 | Stop reason: HOSPADM

## 2017-02-17 RX ORDER — QUINAPRIL 20 MG/1
20 TABLET ORAL DAILY
Status: CANCELLED | OUTPATIENT
Start: 2017-02-18

## 2017-02-17 RX ORDER — INSULIN ASPART 100 [IU]/ML
0-5 INJECTION, SOLUTION INTRAVENOUS; SUBCUTANEOUS EVERY 6 HOURS PRN
Status: CANCELLED | OUTPATIENT
Start: 2017-02-17

## 2017-02-17 RX ORDER — AMLODIPINE BESYLATE 10 MG/1
10 TABLET ORAL DAILY
Status: CANCELLED | OUTPATIENT
Start: 2017-02-18

## 2017-02-17 RX ORDER — HYDROMORPHONE HYDROCHLORIDE 1 MG/ML
1 INJECTION, SOLUTION INTRAMUSCULAR; INTRAVENOUS; SUBCUTANEOUS
Status: DISCONTINUED | OUTPATIENT
Start: 2017-02-17 | End: 2017-03-04 | Stop reason: HOSPADM

## 2017-02-17 RX ORDER — MAG HYDROX/ALUMINUM HYD/SIMETH 200-200-20
15 SUSPENSION, ORAL (FINAL DOSE FORM) ORAL EVERY 6 HOURS PRN
Status: DISCONTINUED | OUTPATIENT
Start: 2017-02-17 | End: 2017-03-04 | Stop reason: HOSPADM

## 2017-02-17 RX ORDER — METHOCARBAMOL 750 MG/1
750 TABLET, FILM COATED ORAL 3 TIMES DAILY PRN
Status: CANCELLED | OUTPATIENT
Start: 2017-02-17

## 2017-02-17 RX ORDER — OXYCODONE HYDROCHLORIDE 5 MG/1
10 TABLET ORAL EVERY 4 HOURS PRN
Status: DISCONTINUED | OUTPATIENT
Start: 2017-02-17 | End: 2017-03-04 | Stop reason: HOSPADM

## 2017-02-17 RX ORDER — HEPARIN SODIUM 5000 [USP'U]/ML
5000 INJECTION, SOLUTION INTRAVENOUS; SUBCUTANEOUS EVERY 8 HOURS
Status: CANCELLED | OUTPATIENT
Start: 2017-02-17

## 2017-02-17 RX ORDER — ZOLPIDEM TARTRATE 5 MG/1
5 TABLET ORAL NIGHTLY PRN
Status: DISCONTINUED | OUTPATIENT
Start: 2017-02-17 | End: 2017-02-21

## 2017-02-17 RX ADMIN — GABAPENTIN 300 MG: 300 CAPSULE ORAL at 03:02

## 2017-02-17 RX ADMIN — TAMSULOSIN HYDROCHLORIDE 0.4 MG: 0.4 CAPSULE ORAL at 08:02

## 2017-02-17 RX ADMIN — OXYCODONE HYDROCHLORIDE 15 MG: 5 TABLET ORAL at 03:02

## 2017-02-17 RX ADMIN — OXYCODONE HYDROCHLORIDE 10 MG: 10 TABLET, FILM COATED, EXTENDED RELEASE ORAL at 09:02

## 2017-02-17 RX ADMIN — HEPARIN SODIUM 5000 UNITS: 5000 INJECTION, SOLUTION INTRAVENOUS; SUBCUTANEOUS at 05:02

## 2017-02-17 RX ADMIN — CARVEDILOL 25 MG: 25 TABLET, FILM COATED ORAL at 08:02

## 2017-02-17 RX ADMIN — OXYCODONE HYDROCHLORIDE 15 MG: 5 TABLET ORAL at 04:02

## 2017-02-17 RX ADMIN — QUINAPRIL 20 MG: 20 TABLET ORAL at 08:02

## 2017-02-17 RX ADMIN — FENTANYL TRANSDERMAL 1 PATCH: 75 PATCH, EXTENDED RELEASE TRANSDERMAL at 08:02

## 2017-02-17 RX ADMIN — INSULIN DETEMIR 18 UNITS: 100 INJECTION, SOLUTION SUBCUTANEOUS at 08:02

## 2017-02-17 RX ADMIN — INSULIN ASPART 2 UNITS: 100 INJECTION, SOLUTION INTRAVENOUS; SUBCUTANEOUS at 05:02

## 2017-02-17 RX ADMIN — OXYCODONE HYDROCHLORIDE 15 MG: 5 TABLET ORAL at 10:02

## 2017-02-17 RX ADMIN — HEPARIN SODIUM 5000 UNITS: 5000 INJECTION, SOLUTION INTRAVENOUS; SUBCUTANEOUS at 03:02

## 2017-02-17 RX ADMIN — HEPARIN SODIUM 5000 UNITS: 5000 INJECTION, SOLUTION INTRAVENOUS; SUBCUTANEOUS at 09:02

## 2017-02-17 RX ADMIN — AMLODIPINE BESYLATE 10 MG: 10 TABLET ORAL at 08:02

## 2017-02-17 RX ADMIN — GABAPENTIN 300 MG: 300 CAPSULE ORAL at 05:02

## 2017-02-17 RX ADMIN — FAMOTIDINE 20 MG: 20 TABLET, FILM COATED ORAL at 08:02

## 2017-02-17 RX ADMIN — DEXAMETHASONE 8 MG: 4 TABLET ORAL at 08:02

## 2017-02-17 RX ADMIN — INSULIN ASPART 6 UNITS: 100 INJECTION, SOLUTION INTRAVENOUS; SUBCUTANEOUS at 08:02

## 2017-02-17 RX ADMIN — LABETALOL HYDROCHLORIDE 20 MG: 5 INJECTION, SOLUTION INTRAVENOUS at 04:02

## 2017-02-17 RX ADMIN — INSULIN ASPART 6 UNITS: 100 INJECTION, SOLUTION INTRAVENOUS; SUBCUTANEOUS at 05:02

## 2017-02-17 RX ADMIN — OXYCODONE HYDROCHLORIDE 15 MG: 5 TABLET ORAL at 08:02

## 2017-02-17 RX ADMIN — GABAPENTIN 300 MG: 300 CAPSULE ORAL at 09:02

## 2017-02-17 NOTE — PLAN OF CARE
Problem: Patient Care Overview  Goal: Plan of Care Review  Outcome: Ongoing (interventions implemented as appropriate)  Patient alert and oriented x 4.  Very pleasant man.  RA.  OOB to chair with PT.  Voids independently in the urinal.  Anterior neck incision covered with gauze - C/D/I.  Neuro checks q 4 hours.  Tolerating his 2000 calorie Diabetic diet.  Small skin tear noted to sacrum/buttocks - barrier cream applied once.       Patient left via w/c at 11am to go to [facility].  He left in NAD.      Problem: Diabetes, Type 2 (Adult)  Goal: Signs and Symptoms of Listed Potential Problems Will be Absent, Minimized or Managed (Diabetes, Type 2)  Signs and symptoms of listed potential problems will be absent, minimized or managed by discharge/transition of care (reference Diabetes, Type 2 (Adult) CPG).   Outcome: Ongoing (interventions implemented as appropriate)  AC/HS BBG checks.  AM .  Scheduled meal time insulin and levemir given per eMAR.      Problem: Fall Risk (Adult)  Goal: Identify Related Risk Factors and Signs and Symptoms  Related risk factors and signs and symptoms are identified upon initiation of Human Response Clinical Practice Guideline (CPG)   Outcome: Ongoing (interventions implemented as appropriate)  No falls this shift.  Assist x 1-2 to get OOB.      Problem: Pain, Acute (Adult)  Goal: Identify Related Risk Factors and Signs and Symptoms  Related risk factors and signs and symptoms are identified upon initiation of Human Response Clinical Practice Guideline (CPG)   Outcome: Ongoing (interventions implemented as appropriate)  Fentanyl patch applied to left upper arm.  Medicated with 15mg of oxycodone once for pain.

## 2017-02-17 NOTE — ASSESSMENT & PLAN NOTE
--Colon cancer with mets to spine  --s/p corpectomy and spinal fusion  --POD #4  --Management per primary team

## 2017-02-17 NOTE — PROGRESS NOTES
[facility] Hospital Medicine  Progress Note    Patient Name: Rah Puente  MRN: [id number]  Patient Class: IP- Inpatient   Admission Date: 2/9/2017  Length of Stay: 8 days  Attending Physician: Franklyn Ng MD  Primary Care Provider: Brooks Gilebrt MD    [facility] Medicine Team: Networked reference to record PCT  Mariya Hillman MD    Subjective:     Principal Problem:Malignant neoplasm metastatic to bone    HPI:  61 year old male with PMHx T2DM, HTN, HLD, stage I colon Ca who prsented to hospital with neck pain and UE weakness. CT imaging was found to be concerning for mets. Pt was initially admitted to the hem/onc service, but is now on the orthopedic service s/p C5-6 corpectomy with C4-7 anterior cervical fusion. Medicine was consulted for co-management of his medical issues. This afternoon, pt is not reporting much pain. He denies any HA, chest pain, vision changes, shortness of breath, palpitations, abdominal pain, N/V or lower extremity swelling.    Hospital Course:       Interval History: No AEON. Pt reports pain in his upper back and neck. He is otherwise feeling well.    Review of Systems   Constitutional: Negative for chills and fever.   Respiratory: Negative for cough, shortness of breath and wheezing.    Cardiovascular: Negative for chest pain, palpitations and leg swelling.   Gastrointestinal: Negative for abdominal pain, constipation, diarrhea, nausea and vomiting.   Genitourinary: Negative for difficulty urinating and dysuria.   Musculoskeletal: Positive for back pain and neck pain.     Objective:     Vital Signs (Most Recent):  Temp: 96 °F (35.6 °C) (02/17/17 0800)  Pulse: 88 (02/17/17 0900)  Resp: 16 (02/17/17 0800)  BP: (!) 186/89 (02/17/17 0800)  SpO2: 95 % (02/17/17 0800) Vital Signs (24h Range):  Temp:  [96 °F (35.6 °C)-98 °F (36.7 °C)] 96 °F (35.6 °C)  Pulse:  [60-88] 88  Resp:  [16-18] 16  SpO2:  [95 %-96 %] 95 %  BP: (130-190)/(67-94) 186/89     Weight: 77.2 kg  (170 lb 3.1 oz)  Body mass index is 21.84 kg/(m^2).    Intake/Output Summary (Last 24 hours) at 02/17/17 1004  Last data filed at 02/17/17 0800   Gross per 24 hour   Intake              714 ml   Output             1275 ml   Net             -561 ml      Physical Exam   Constitutional: He appears well-developed and well-nourished. No distress.   Neck:   C collar in place   Cardiovascular: Normal rate, regular rhythm, normal heart sounds and intact distal pulses.  Exam reveals no gallop and no friction rub.    No murmur heard.  Pulmonary/Chest: Effort normal and breath sounds normal. No respiratory distress. He has no wheezes. He has no rales.   Abdominal: Soft. Bowel sounds are normal. He exhibits no distension. There is no tenderness.       Significant Labs:   CBC:   Recent Labs  Lab 02/16/17 0425 02/17/17  0418   WBC 10.71 9.93   HGB 10.3* 10.0*   HCT 30.3* 29.4*    144*     CMP:   Recent Labs  Lab 02/16/17 0425 02/17/17  0418   * 126*   K 4.3 4.6   CL 90* 90*   CO2 26 25   * 182*   BUN 47* 51*   CREATININE 1.4 1.5*   CALCIUM 8.6* 8.8   PROT 6.1 6.0   ALBUMIN 2.5* 2.5*   BILITOT 0.5 0.6   ALKPHOS 238* 230*   AST 43* 47*   ALT 29 34   ANIONGAP 10 11   EGFRNONAA 53.1* 48.8*       Significant Imaging: I have reviewed all pertinent imaging results/findings within the past 24 hours.    Assessment/Plan:      * Malignant neoplasm metastatic to bone  --Colon cancer with mets to spine  --s/p corpectomy and spinal fusion  --POD #4  --Management per primary team      Type 2 diabetes mellitus with diabetic polyneuropathy, without long-term current use of insulin  --BG now under better control.  --Hb A1c 6.5 on Feb 2017  --Hyperglycemia likely 2/2 dexamethasone  --Continue levemir to 18 units qam  --Continue novolog to 6 units qac with low dose ssi.      Chronic hyponatremia  --Initially 122 on admission. Believed to be 2/2 hypovolemic hyponatremia. Pt started on fluids (discontinued on 2/14)  --126  today.  --Trend closely.        Essential hypertension  --Pt continues to have elevated BP requiring labetalol pushes. ACEI only restarted yesterday and believe pain to also be contributing to elevated bp.  --Pt asymptomatic  --Continue [id number] mg qd, ACEI and coreg 25 bid.   --Pain control per primary service  --Continue prn labetalol IV 20 mg for sbp>180      AGUILAR (acute kidney injury)  --Baseline Cr 1.0  --Cr stable today at 1.5  --Peaked at 3.4  --Avoid nephrotoxic agents  --Renally dose medications  --Monitor I/O      Benign non-nodular prostatic hyperplasia without lower urinary tract symptoms  --Continue home tamsulosin      VTE Risk Mitigation         Ordered     heparin (porcine) injection 5,000 Units  Every 8 hours     Route:  Subcutaneous        02/16/17 1147     Medium Risk of VTE  Once      02/13/17 1859     Place sequential compression device  Until discontinued      02/13/17 1859     Place DARIO hose  Until discontinued      02/13/17 1859          Mariya Hillman MD  Department of Hospital Medicine   [facility]

## 2017-02-17 NOTE — PROGRESS NOTES
Admit note: arrived to [facility] per wheelchair accompanied by a Van escort (from [id number]).

## 2017-02-17 NOTE — PLAN OF CARE
POD 4 s/p corpectomy C5/C6; fusion C4/C7. PT/OT recommended inpatient rehab. Plans to transfer/discharge patient to [facility] today. Discharge and follow-up instructions to be completed by bedside nurse. SW and CM will continue to follow for any additional needs.     Future Appointments  Date Time Provider Department Center   3/7/2017 11:00 AM Franklyn Ng MD [facility] SPINE [address]   3/8/2017 10:30 AM LAB, HEMONC CANCER BLDG [facility] LAB [address]   3/8/2017 11:30 AM Bill Goetz MD [facility] HEM ONC [address]      02/17/17 1000   Final Note   Assessment Type Final Discharge Note   Discharge Disposition Rehab  ([facility])   Discharge planning education complete? Yes   What phone number can be called within the next 1-3 days to see how you are doing after discharge? ([phone])   Hospital Follow Up  Appt(s) scheduled? Yes   Discharge plans and expectations educations in teach back method with documentation complete? Yes   Offered [facility] -- Bedside Delivery? Yes   Discharge/Hospital Encounter Summary to (non[facility]) PCP n/a   Referral to Outpatient Case Management complete? n/a   Referral to / orders for Home Health Complete? n/a   30 day supply of medicines given at discharge, if documented non-compliance / non-adherence? n/a   Any social issues identified prior to discharge? No   Did you assess the readiness or willingness of the family or caregiver to support self management of care? Yes

## 2017-02-17 NOTE — ASSESSMENT & PLAN NOTE
--Pt continues to have elevated BP requiring labetalol pushes. ACEI only restarted yesterday and believe pain to also be contributing to elevated bp.  --Pt asymptomatic  --Continue [id number] mg qd, ACEI and coreg 25 bid.   --Pain control per primary service  --Continue prn labetalol IV 20 mg for sbp>180

## 2017-02-17 NOTE — DISCHARGE SUMMARY
[facility]  Discharge Summary      Admit Date: 2/9/2017    Discharge Date and Time: 2/17/2017 11:40 AM    Attending Physician: Franklyn Ng MD     Reason for Admission: Patient admitted for metastatic lesions to spine.      Procedures Performed: Procedure(s) (LRB):  CORPECTOMY - C5/C6  Fusion C4/C7 (N/A)    Hospital Course (synopsis of major diagnoses, care, treatment, and services provided during the course of the hospital stay): Patient admitted and underwent surgery without complication.  He worked with PT/OT.  His hypertension and hyponatremia were managed by medicine.  See progress notes.  He was discharged to Rehab in good condition.     Consults:     Significant Diagnostic Studies:     Final Diagnoses:    Principal Problem: Malignant neoplasm metastatic to bone   Secondary Diagnoses: hypertension    Discharged Condition: good    Disposition: Rehab Facility    Follow Up/Patient Instructions:     Medications:  Transfer Medications (for Discharge Readmit only):   No current facility-administered medications for this encounter.      No current outpatient prescriptions on file.     Facility-Administered Medications Ordered in Other Encounters   Medication Dose Route Frequency Provider Last Rate Last Dose    acetaminophen tablet 650 mg  650 mg Oral Q4H PRN Brody Savage MD        aluminum-magnesium hydroxide-simethicone 200-200-20 mg/5 mL suspension 15 mL  15 mL Oral Q6H PRN Abhijit Burnett MD        [START ON 2/18/2017] amlodipine tablet 10 mg  10 mg Oral Daily Abhijit Burnett MD        bisacodyl suppository 10 mg  10 mg Rectal Daily PRN Brody Savage MD        carvedilol tablet 25 mg  25 mg Oral BID Abhijit Burnett MD        dextrose 50% injection 12.5 g  12.5 g Intravenous PRN Abhijit Burnett MD        dextrose 50% injection 12.5 g  12.5 g Intravenous PRN Abhijit Burnett MD        dextrose 50% injection 25 g  25 g Intravenous PRN Abhijit Burnett MD        diphenoxylate-atropine  2.5-0.025 mg per tablet 2 tablet  2 tablet Oral PRN Brody Savage MD        docusate sodium enema 1 enema  1 enema Rectal Daily PRN Brody Savage MD        famotidine tablet 20 mg  20 mg Oral BID Abhijit Burnett MD        gabapentin capsule 300 mg  300 mg Oral TID Abhijit Burnett MD   300 mg at 02/17/17 1508    glucagon (human recombinant) injection 1 mg  1 mg Intramuscular PRN Abhijit Burnett MD        glucose chewable tablet 16 g  16 g Oral PRN Abhijit Burnett MD        glucose chewable tablet 24 g  24 g Oral PRN Abhijit Burnett MD        heparin (porcine) injection 5,000 Units  5,000 Units Subcutaneous Q8H Abhijit Burnett MD   5,000 Units at 02/17/17 1508    hydromorphone (PF) injection 1 mg  1 mg Intramuscular Q3H PRN Brody Savage MD        insulin aspart pen 0-5 Units  0-5 Units Subcutaneous Q6H PRN Abhijit Burnett MD        insulin aspart pen 6 Units  6 Units Subcutaneous TIDWM Abhijit Burnett MD        [START ON 2/18/2017] insulin detemir pen 18 Units  18 Units Subcutaneous Daily Abhijit Burnett MD        magnesium hydroxide 400 mg/5 ml suspension 2,400 mg  30 mL Oral Daily PRN Brody Savage MD        methocarbamol tablet 750 mg  750 mg Oral TID PRN Abhijit Burnett MD        ondansetron disintegrating tablet 8 mg  8 mg Oral Q8H PRN Abhijit Burnett MD        oxycodone 12 hr tablet 10 mg  10 mg Oral QHS Brody Savage MD        oxycodone immediate release tablet 10 mg  10 mg Oral Q4H PRN Abhijit Burnett MD        oxycodone immediate release tablet 15 mg  15 mg Oral Q4H PRN Abhijit Burnett MD   15 mg at 02/17/17 1518    polyethylene glycol packet 17 g  17 g Oral Q12H PRN Brody Savage MD        [START ON 2/18/2017] quinapril tablet 20 mg  20 mg Oral Daily Abhijit Burnett MD        [START ON 2/18/2017] senna-docusate 8.6-50 mg per tablet 1 tablet  1 tablet Oral Daily Abhijit Burnett MD        sodium phosphates 19-7 gram/118 mL enema 1 enema  1 enema Rectal Daily PRN Brody Savage  MD        tamsulosin 24 hr capsule 0.4 mg  0.4 mg Oral BID Abhijit Burnett MD        zolpidem tablet 5 mg  5 mg Oral Nightly PRN Brody Savage MD         No discharge procedures on file.  Follow-up Information     Follow up with Franklyn Ng MD. Go on 3/7/2017.    Specialties:  Orthopedic Surgery, Spine Surgery    Why:  Follow-Up Appointment    Contact information:    [address]  [facility] [address]  [phone]          Follow up with Bill Goetz MD. Go on 3/8/2017.    Specialty:  Hematology and Oncology    Why:  Follow-Up Appointment    Contact information:    [address]  [facility] [address]  [phone]

## 2017-02-17 NOTE — PLAN OF CARE
Problem: Physical Therapy Goal  Goal: Physical Therapy Goal  Goals to be met by: 17     Patient will increase functional independence with mobility by performin. Supine to sit with CGA-not met  2. Sit to supine with CGA-not met  3. Sit to stand transfer with RW with CGA-not met  4. Bed to chair transfer with CGA using Rolling Walker-not met  5. Gait x 200 feet with Supervision using Rolling Walker-not met.   6. Ascend/descend 5 stair with bilateral Handrails minimal assist-not met.   7. Lower extremity exercise program x 20 reps per handout, with independence-not met.   Pt progressing towards goals. continue with PT POC.Goals remain appropriate.    Harpreet Bautista PTA     2017

## 2017-02-17 NOTE — PLAN OF CARE
Problem: Patient Care Overview  Goal: Plan of Care Review  Outcome: Ongoing (interventions implemented as appropriate)  POC reviewed with pt who verbalized understanding. AAOx4. Elevated BP treated with PRN meds per MAR; VSS otherwise. Remains free of falls and injury. Neck incision with gauze intact.  C collar in place at all times. Pain managed with PRN meds per MAR. Voiding per urinal/up with max assist to beside commode. Tolerating diet; no complaints of nausea per MAR. Telemetry monitored running SR. Blood glucose monitored at bedtime with no coverage needed. SCDs in place. No acute events. No distress noted. Resting well between care. Will continue to monitor.

## 2017-02-17 NOTE — IP AVS SNAPSHOT
[facility]  [address]  [facility] [address]  Phone: [phone]           Patient Discharge Instructions     Our goal is to set you up for success. This packet includes information on your condition, medications, and your home care. It will help you to care for yourself so you don't get sicker and need to go back to the hospital.     Please ask your nurse if you have any questions.        There are many details to remember when preparing to leave the hospital. Here is what you will need to do:    1. Take your medicine. If you are prescribed medications, review your Medication List in the following pages. You may have new medications to  at the pharmacy and others that you'll need to stop taking. Review the instructions for how and when to take your medications. Talk with your doctor or nurses if you are unsure of what to do.     2. Go to your follow-up appointments. Specific follow-up information is listed in the following pages. Your may be contacted by a transition nurse or clinical provider about future appointments. Be sure we have all of the phone numbers to reach you, if needed. Please contact your provider's office if you are unable to make an appointment.     3. Watch for warning signs. Your doctor or nurse will give you detailed warning signs to watch for and when to call for assistance. These instructions may also include educational information about your condition. If you experience any of warning signs to your health, call your doctor.               [facility]  Unless otherwise directed by your provider, please contact [facility], our nurse care line that is available for 24/7 assistance.     [phone] (toll-free)    Registered nurses in the [facility] provide clinical advisement, health education, appointment booking, and other advisory services.                    ** Verify the list of medication(s) below is accurate and up  to date. Carry this with you in case of emergency. If your medications have changed, please notify your healthcare provider.             Medication List      START taking these medications        Additional Info                      acetaminophen 325 MG tablet   Commonly known as:  TYLENOL   Refills:  0   Dose:  650 mg    Instructions:  Take 2 tablets (650 mg total) by mouth every 6 (six) hours as needed for Pain or Temperature greater than (101F).     Begin Date    AM    Noon    PM    Bedtime       aluminum-magnesium hydroxide-simethicone 200-200-20 mg/5 mL Susp   Commonly known as:  MAALOX   Refills:  0   Dose:  15 mL    Instructions:  Take 15 mLs by mouth every 6 (six) hours as needed (acid reflux).     Begin Date    AM    Noon    PM    Bedtime       amlodipine 10 MG tablet   Commonly known as:  NORVASC   Quantity:  30 tablet   Refills:  3   Dose:  10 mg    Last time this was given:  10 mg on 3/4/2017  8:44 AM   Instructions:  Take 1 tablet (10 mg total) by mouth once daily.     Begin Date    AM    Noon    PM    Bedtime       bisacodyl 10 mg Supp   Commonly known as:  DULCOLAX   Refills:  0   Dose:  10 mg    Instructions:  Place 1 suppository (10 mg total) rectally daily as needed.     Begin Date    AM    Noon    PM    Bedtime       carvedilol 25 MG tablet   Commonly known as:  COREG   Quantity:  60 tablet   Refills:  3   Dose:  25 mg    Last time this was given:  25 mg on 3/4/2017  8:43 AM   Instructions:  Take 1 tablet (25 mg total) by mouth 2 (two) times daily.     Begin Date    AM    Noon    PM    Bedtime       docusate sodium 283 mg enema   Commonly known as:  ENEMEEZ   Refills:  0   Dose:  1 enema   Replaces:  docusate sodium 50 MG capsule    Instructions:  Place 1 enema rectally daily as needed.     Begin Date    AM    Noon    PM    Bedtime       famotidine 20 MG tablet   Commonly known as:  PEPCID   Quantity:  60 tablet   Refills:  3   Dose:  20 mg    Last time this was given:  20 mg on 3/4/2017  8:43 AM    Instructions:  Take 1 tablet (20 mg total) by mouth 2 (two) times daily.     Begin Date    AM    Noon    PM    Bedtime       magnesium hydroxide 400 mg/5 ml 400 mg/5 mL Susp   Refills:  0   Dose:  30 mL    Instructions:  Take 30 mLs (2,400 mg total) by mouth daily as needed.     Begin Date    AM    Noon    PM    Bedtime       oxycodone 10 mg Tab immediate release tablet   Commonly known as:  ROXICODONE   Quantity:  90 tablet   Refills:  0   Dose:  10 mg    Last time this was given:  10 mg on 3/4/2017  8:51 AM   Instructions:  Take 1 tablet (10 mg total) by mouth every 4 (four) hours as needed.     Begin Date    AM    Noon    PM    Bedtime       senna-docusate 8.6-50 mg 8.6-50 mg per tablet   Commonly known as:  PERICOLACE   Refills:  0   Dose:  1 tablet    Last time this was given:  1 tablet on 3/3/2017  7:57 AM   Instructions:  Take 1 tablet by mouth once daily.     Begin Date    AM    Noon    PM    Bedtime       sodium chloride 1 gram tablet   Refills:  0   Dose:  1 g    Last time this was given:  1 g on 3/4/2017  8:44 AM   Instructions:  Take 1 tablet (1 g total) by mouth 2 (two) times daily.     Begin Date    AM    Noon    PM    Bedtime       sodium phosphates 19-7 gram/118 mL Enem   Commonly known as:  ENEMA   Refills:  0   Dose:  1 enema    Instructions:  Place 1 enema rectally daily as needed (constipation).     Begin Date    AM    Noon    PM    Bedtime       zolpidem 5 MG Tab   Commonly known as:  AMBIEN   Quantity:  30 tablet   Refills:  3   Dose:  5 mg    Last time this was given:  5 mg on 3/3/2017  8:42 PM   Instructions:  Take 1 tablet (5 mg total) by mouth every evening.     Begin Date    AM    Noon    PM    Bedtime         CHANGE how you take these medications        Additional Info                      gabapentin 300 MG capsule   Commonly known as:  NEURONTIN   Quantity:  90 capsule   Refills:  3   Dose:  300 mg   What changed:  when to take this    Last time this was given:  300 mg on 3/4/2017   5:19 AM   Instructions:  Take 1 capsule (300 mg total) by mouth 3 (three) times daily.     Begin Date    AM    Noon    PM    Bedtime       methocarbamol 750 MG Tab   Commonly known as:  ROBAXIN   Quantity:  90 tablet   Refills:  3   Dose:  750 mg   What changed:  reasons to take this    Last time this was given:  750 mg on 2/24/2017  5:55 PM   Instructions:  Take 1 tablet (750 mg total) by mouth 3 (three) times daily as needed.     Begin Date    AM    Noon    PM    Bedtime       quinapril 20 MG tablet   Commonly known as:  ACCUPRIL   Quantity:  30 tablet   Refills:  3   Dose:  20 mg   What changed:  when to take this    Last time this was given:  20 mg on 3/4/2017  8:43 AM   Instructions:  Take 1 tablet (20 mg total) by mouth once daily.     Begin Date    AM    Noon    PM    Bedtime       tamsulosin 0.4 mg Cp24   Commonly known as:  FLOMAX   Quantity:  30 capsule   Refills:  3   Dose:  0.4 mg   What changed:  when to take this    Last time this was given:  0.4 mg on 3/4/2017  8:44 AM   Instructions:  Take 1 capsule (0.4 mg total) by mouth every evening.     Begin Date    AM    Noon    PM    Bedtime         CONTINUE taking these medications        Additional Info                      ondansetron 8 MG Tbdl   Commonly known as:  ZOFRAN-ODT   Quantity:  60 tablet   Refills:  3   Dose:  8 mg    Last time this was given:  8 mg on 3/3/2017  9:21 AM   Instructions:  Take 1 tablet (8 mg total) by mouth every 8 (eight) hours as needed.     Begin Date    AM    Noon    PM    Bedtime         STOP taking these medications     docusate sodium 50 MG capsule   Commonly known as:  COLACE   Replaced by:  docusate sodium 283 mg enema       fentaNYL 50 mcg/hr   Commonly known as:  DURAGESIC       hydrocodone-acetaminophen 10-325mg  mg Tab   Commonly known as:  NORCO       metformin 500 MG tablet   Commonly known as:  GLUCOPHAGE       metoprolol succinate 200 MG 24 hr tablet   Commonly known as:  TOPROL-XL        oxycodone-acetaminophen  mg per tablet   Commonly known as:  PERCOCET       promethazine 12.5 MG Tab   Commonly known as:  PHENERGAN            Where to Get Your Medications      These medications were sent to [facility] [address] [place] [address] - [address] AT [facility] &  [address], [facility] [address]     Phone:  [phone]     amlodipine 10 MG tablet    carvedilol 25 MG tablet    famotidine 20 MG tablet    gabapentin 300 MG capsule    methocarbamol 750 MG Tab    ondansetron 8 MG Tbdl    oxycodone 10 mg Tab immediate release tablet    quinapril 20 MG tablet    tamsulosin 0.4 mg Cp24    zolpidem 5 MG Tab         You can get these medications from any pharmacy     You don't need a prescription for these medications     acetaminophen 325 MG tablet    aluminum-magnesium hydroxide-simethicone 200-200-20 mg/5 mL Susp    bisacodyl 10 mg Supp    docusate sodium 283 mg enema    magnesium hydroxide 400 mg/5 ml 400 mg/5 mL Susp    senna-docusate 8.6-50 mg 8.6-50 mg per tablet    sodium chloride 1 gram tablet    sodium phosphates 19-7 gram/118 mL Enem                  Please bring to all follow up appointments:    1. A copy of your discharge instructions.  2. All medicines you are currently taking in their original bottles.  3. Identification and insurance card.    Please arrive 15 minutes ahead of scheduled appointment time.    Please call 24 hours in advance if you must reschedule your appointment and/or time.        Your Scheduled Appointments     Mar 07, 2017 11:00 AM CST   Post OP with Franklyn Ng MD   [facility] ([facility] )    [address]  [place] [address]   [phone]                Discharge Instructions     Future Orders    Diet diabetic     Comments:    [address]  Glucerna 1 can TID with meals  Supervise and encourage meals    No activity restrictions     Skin assessment every shift      Vital signs per facility protocol      "    Discharge Instructions       Nursin.  Sacral/buttock area:  nursing to clean the wound with wound cleanser, apply Santyl to the wound/eschar areas to enzymatic debride the wound, cover with moisten gauze to activate Santyl then cover with a mepore border dressing daily.  2.  Aspiration precautions  3.  Float heels  4.  Supervise and encourage meals, please  5.  Cervical collar when OOB until 3/27/17.  May remove in bed and for showers.      Admission Information     Date & Time Provider Department CSN    2017 11:53 AM Brody Savage MD [facility] [id number]      Care Providers     Provider Role Specialty Primary office phone    Brody Savage MD Attending Provider Physical Medicine and Rehabilitation [phone]      Your Vitals Were     BP Pulse Temp Resp Height Weight    112/58 (BP Location: Right arm, Patient Position: Lying, BP Method: Automatic) 86 98.2 °F (36.8 °C) (Oral) 16 6' 2" (1.88 m) 67 kg (147 lb 12.8 oz)    SpO2 BMI             92% 18.98 kg/m2         Recent Lab Values        2012 3/12/2016 3/13/2016 2017                  5:45 AM  6:29 PM  5:11 AM  7:45 PM        A1C 5.4 5.8 5.8 6.5 (H)        Comment for A1C at  7:45 PM on 2017:  According to ADA guidelines, hemoglobin A1C <7.0% represents  optimal control in non-pregnant diabetic patients.  Different  metrics may apply to specific populations.   Standards of Medical Care in Diabetes - 2016.  For the purpose of screening for the presence of diabetes:  <5.7%     Consistent with the absence of diabetes  5.7-6.4%  Consistent with increasing risk for diabetes   (prediabetes)  >or=6.5%  Consistent with diabetes  Currently no consensus exists for use of hemoglobin A1C  for diagnosis of diabetes for children.        Allergies as of 3/4/2017     No Known Allergies      Advance Directives     An advance directive is a document which, in the event you are no longer able to make decisions for yourself, tells " your healthcare team what kind of treatment you do or do not want to receive, or who you would like to make those decisions for you.  If you do not currently have an advance directive, [facility] encourages you to create one.  For more information call:  [phone]), [phone] ([phone]),  or log on to [email or web address].        Smoking Cessation     If you would like to quit smoking:   You may be eligible for free services if you are a [place] resident and started smoking cigarettes before September 1, 1988.  Call the Smoking Cessation Trust (SCT) toll free at (907) 456-0109 or (989) 476-8840.   Call 4-835-QUIT-NOW if you do not meet the above criteria.            Language Assistance Services     ATTENTION: Language assistance services are available, free of charge. Please call [phone].      ATENCIÓN: Si habla español, tiene a walker disposición servicios gratuitos de asistencia lingüística. Llame al [phone].     [facility] Ý: N?u b?n nói Ti?ng Vi?t, có các d?ch v? h? tr? ngôn ng? mi?n phí dành cho b?n. G?i s? [phone].        Chronic Kindey Disease Education             Diabetes Discharge Instructions                                    [facility] complies with applicable Federal civil rights laws and does not discriminate on the basis of race, color, national origin, age, disability, or sex.

## 2017-02-17 NOTE — ASSESSMENT & PLAN NOTE
--Baseline Cr 1.0  --Cr stable today at 1.5  --Peaked at 3.4  --Avoid nephrotoxic agents  --Renally dose medications  --Monitor I/O

## 2017-02-17 NOTE — H&P
"[facility]  History & Physical    Subjective:      Rah Puente is a 61-year-old male with PMHx of HTN, DMII with neuropathy, BPH, R hip fracture (summer 2016), chronic hyponatremia, and colon cancer s/p colectomy (3/2016) with metastatic disease to lungs and bone (found on CT 1/2017 when evaluated for worsening neck and back pain). Patient presented to [facility] from hem/onc clinic on 2/9/17 for neck pain and progressively worsening BUE>BLE R>L weakness x 2 weeks with associated gait instability and falls. MRI C/T/L revealed metastatic disease throughout the cervical, thoracic, and lumbosacral spine and pelvis with C5 compression/burst pathologic fracture with retropulsion into canal. MRI brain showed ossesous calvarial mets but no acute intracranial structures. Retroperitoneal US consistent with chronic renal disease and suggestive of liver metastasis. Evaluated by ortho and underwent C5-C6 corpectomy with C4-7 anterior cervical fusion on 2/13/17 after which rad txs were started.  The pt demonstrated significant mobility and ADL impairments including wit-->stand MNA, T/Fs MNA, ability to walk only 13' MNA, UB dressing MXA, LB dressing DEP.  He was admitted to [facility] to address the mobility and ADL impairments.  His CC is "walking".     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  Social History   Substance Use Topics    Smoking status: Former Smoker     Packs/day: 1.00     Types: Cigarettes     Quit date: 12/7/2014    Smokeless tobacco: Not on file    Alcohol use 2.4 oz/week     4 Shots of liquor per week      Comment: vodka-nightly       PTA Medications   Medication Sig    docusate sodium (COLACE) 50 MG capsule Take 2 capsules (100 mg total) by mouth 2 (two) times daily as needed.    fentaNYL (DURAGESIC) 50 mcg/hr Place 1 patch onto the skin every 72 hours.    gabapentin (NEURONTIN) 300 MG capsule Take 300 mg by mouth 2 (two) times daily.    hydrocodone-acetaminophen 10-325mg (NORCO)  mg Tab Take 1 tablet by mouth every 4 (four) hours as needed for Pain.    metformin (GLUCOPHAGE) 500 MG tablet Take 500 mg by mouth 2 (two) times daily with meals.    methocarbamol (ROBAXIN) 750 MG Tab Take 1 tablet (750 mg total) by mouth 3 (three) times daily as needed (muscle spasms).    metoprolol succinate (TOPROL-XL) 200 MG 24 hr tablet Take 200 mg by mouth 2 (two) times daily.    ondansetron (ZOFRAN-ODT) 8 MG TbDL Take 1 tablet (8 mg total) by mouth every 8 (eight) hours as needed.    oxycodone-acetaminophen (PERCOCET)  mg per tablet Take 1 tablet by mouth every 6 (six) hours as needed for Pain.    promethazine (PHENERGAN) 12.5 MG Tab Take 1 tablet (12.5 mg total) by mouth every 8 (eight) hours as needed (nausea).    quinapril (ACCUPRIL) 20 MG tablet Take 20 mg by mouth every evening.    tamsulosin (FLOMAX) 0.4 mg Cp24 Take 0.4 mg by mouth once daily.     Review of patient's allergies indicates:  No Known Allergies     Review of Systems   Eyes: Negative for blurred vision.   Respiratory: Negative for cough.    Cardiovascular:  Negative for chest pain.   Gastrointestinal: Negative for nausea and vomiting.   Genitourinary: Negative for dysuria.   Musculoskeletal: Positive for back pain and neck pain. Negative for myalgias.   Skin: Negative.    Neurological: Positive for tingling and weakness. Negative for dizziness, sensory change and headaches.   Psychiatric/Behavioral: Negative for depression. The patient has insomnia.        Objective:      Vital Signs (Most Recent)       Vital Signs Range (Last 24H):  Temp:  [96 °F (35.6 °C)-98 °F (36.7 °C)]   Pulse:  [60-88]   Resp:  [16-18]   BP: (130-190)/(64-94)   SpO2:  [95 %-96 %]     Physical Exam   Constitutional: He is oriented to person, place, and time. He appears well-nourished.   Eyes: EOM are normal. Pupils are equal, round, and reactive to light.   Neck: Neck supple.   Cardiovascular: Normal rate.    Pulmonary/Chest: Effort normal.   Abdominal: Soft.   Musculoskeletal:   BUEs AROM WNL  LLE AROM WNL  RLE with diminished HF   Neurological: He is oriented to person, place, and time.   LUE 4-/5 except 3+/5 EE  RUE 4/5 except 4-/5 EE  LLE 4/5  RLE 3+/5 HF/HE, 4-/5 KF/KE/DF/PF   Skin: No rash noted.   Psychiatric: He has a normal mood and affect.       Data Review:    CBC:   Lab Results   Component Value Date    WBC 9.93 02/17/2017    RBC 3.45 (L) 02/17/2017    HGB 10.0 (L) 02/17/2017    HCT 29.4 (L) 02/17/2017    HCT 27 (L) 02/13/2017     (L) 02/17/2017     BMP:   Lab Results   Component Value Date     (H) 02/17/2017     (L) 02/17/2017    K 4.6 02/17/2017    CL 90 (L) 02/17/2017    CO2 25 02/17/2017    BUN 51 (H) 02/17/2017    CREATININE 1.5 (H) 02/17/2017    CALCIUM 8.8 02/17/2017     Coagulation:   Lab Results   Component Value Date    INR 1.0 02/13/2017    APTT 23.1 02/13/2017        Assessment:    1.  S/P C4-7 fusion with residual cervical myelopathy associated with impaired mobility and ability to perform ADLs.  I will order PT for the mobility impairments, OT for ADL  deficits, RT and  for community reintegration and Rehab nursing for education.    2.  DM2 -- monitor on dexamethasone  3.  HTN -- monitor  4.  Neuropathic pain -- numbness/tingling of fingers -- on gabapentin  5.  CKD -- monitor  6.  Chronic hyponatremia -- monitor    Plan:    PT/OT/RT/SS/Rehab Nursing  ELOS 2 weeks    The patient will be admitted for inpatient comprehensive interdisciplinary rehabilitation to address the impairments and medical conditions listed above while assessing equipment needs and compensatory strategies, with coordinated interdisciplinary services that will include physical therapy, occupational therapy, and close monitoring and treatment with 24-hour rehabilitative nursing. This interdisciplinary program will be performed under the direction of a physiatrist.     I have had the opportunity to examine the patient within 24 hours of admission and have reviewed the Pre-Admission Assessment and find it consistent with my examination and evaluation of the patient. I confirm that this patient is appropriate for admission and treatment in this inpatient rehabilitation hospital, needs intense interdisciplinary rehabilitation care under my direction and is expected to achieve meaningful goals within a reasonable period of time that are consistent with the planned discharge disposition.

## 2017-02-17 NOTE — PT/OT/SLP PROGRESS
Physical Therapy  Treatment    Rah Puente   MRN: [id number]   Admitting Diagnosis: Malignant neoplasm metastatic to bone    PT Received On: 17  PT Start Time: 818     PT Stop Time: 844    PT Total Time (min): 26 min       Billable Minutes:  Gait [id number] and Therapeutic Activity 13    Treatment Type: Treatment  PT/PTA: PTA     PTA Visit Number: 1       General Precautions: Standard, fall  Orthopedic Precautions: N/A   Braces:      Do you have any cultural, spiritual, Jain conflicts, given your current situation?: none    Subjective:  Communicated with RN prior to session.  I feel a little dizzy    Pain Ratin/10     Location - Orientation: generalized  Location: neck  Pain Addressed: Reposition, Distraction, Cessation of Activity  Pain Rating Post-Intervention: 6/10    Objective:   Patient found with: cervical collar, SUSIE drain, telemetry    Functional Mobility:  Bed Mobility:   Rolling/Turning Right: Minimum assistance, With side rail (vcs for technique)  Supine to Sit: Minimum Assistance, With side rail    Transfers:  Sit <> Stand Assistance: Minimum Assistance  Sit <> Stand Assistive Device: Rolling Walker (vcs for hand placement and (A) for L UE hand placement)    Gait:   Gait Distance: 44 ft with vcs for safety and posture  Assistance 1: Contact Guard Assistance  Gait Assistive Device: Rolling walker  Gait Pattern: 3-point gait  Gait Deviation(s): decreased davion, increased time in double stance, decreased step length, decreased stride length, decreased weight-shifting ability, forward lean  Therapeutic Activities and Exercises:   Discussed pt progress and d/c   Patient performed therex X 15 reps seatedB LE AROM AP, LAQ  White board updated      AM-PAC 6 CLICK MOBILITY  How much help from another person does this patient currently need?   1 = Unable, Total/Dependent Assistance  2 = A lot, Maximum/Moderate Assistance  3 = A little, Minimum/Contact Guard/Supervision  4 = None, Modified  [place]/Independent    Turning over in bed (including adjusting bedclothes, sheets and blankets)?: 3  Sitting down on and standing up from a chair with arms (e.g., wheelchair, bedside commode, etc.): 3  Moving from lying on back to sitting on the side of the bed?: 3  Moving to and from a bed to a chair (including a wheelchair)?: 3  Need to walk in hospital room?: 3  Climbing 3-5 steps with a railing?: 1  Total Score: 16    AM-PAC Raw Score CMS G-Code Modifier Level of Impairment Assistance   6 % Total / Unable   7 - 9 CM 80 - 100% Maximal Assist   10 - 14 CL 60 - 80% Moderate Assist   15 - 19 CK 40 - 60% Moderate Assist   20 - 22 CJ 20 - 40% Minimal Assist   23 CI 1-20% SBA / CGA   24 CH 0% Independent/ Mod I     Patient left up in chair with all lines intact, call button in reach and nsg notified.    Assessment:  Rah Puente is a 61 y.o. male with a medical diagnosis of Malignant neoplasm metastatic to bone and presents with   continued deficits as listed below.  Pt Progressing with PT Intervention. Pt Progressing with increase gait distance. Pt would continue to benefit from skilled PT to address overall functional mobility and goals. Goals remain appropriate.        Rehab identified problem list/impairments: Rehab identified problem list/impairments: weakness, impaired endurance, decreased upper extremity function, decreased lower extremity function, pain, impaired cardiopulmonary response to activity, decreased coordination, impaired balance, impaired functional mobilty, impaired self care skills    Rehab potential is good.    Activity tolerance: Good    Discharge recommendations: Discharge Facility/Level Of Care Needs: rehabilitation facility     Barriers to discharge: Barriers to Discharge: Inaccessible home environment, Decreased caregiver support    Equipment recommendations: Equipment Needed After Discharge: wheelchair     GOALS:   Physical Therapy Goals        Problem: Physical  Therapy Goal    Goal Priority Disciplines Outcome Goal Variances Interventions   Physical Therapy Goal     PT/OT, PT Ongoing (interventions implemented as appropriate)     Description:  Goals to be met by: 17     Patient will increase functional independence with mobility by performin. Supine to sit with CGA-not met  2. Sit to supine with CGA-not met  3. Sit to stand transfer with RW with CGA-not met  4. Bed to chair transfer with CGA using Rolling Walker-not met  5. Gait  x 200 feet with Supervision using Rolling Walker-not met.   6. Ascend/descend 5 stair with bilateral Handrails minimal assist-not met.   7. Lower extremity exercise program x 20 reps per handout, with independence-not met.                 PLAN:    Patient to be seen 5 x/week  to address the above listed problems via gait training, therapeutic activities, therapeutic exercises, neuromuscular re-education  Plan of Care expires: 17  Plan of Care reviewed with: patient         Harpreet DELACRUZ Lily, PTA  2017

## 2017-02-17 NOTE — PROGRESS NOTES
Gave report to Walter @ [facility].  IV removed.  Telemetry monitor removed and returned to the nursing station.  VS obtained.  Belongings packed.

## 2017-02-17 NOTE — SUBJECTIVE & OBJECTIVE
Interval History: No AEON. Pt reports pain in his upper back and neck. He is otherwise feeling well.    Review of Systems   Constitutional: Negative for chills and fever.   Respiratory: Negative for cough, shortness of breath and wheezing.    Cardiovascular: Negative for chest pain, palpitations and leg swelling.   Gastrointestinal: Negative for abdominal pain, constipation, diarrhea, nausea and vomiting.   Genitourinary: Negative for difficulty urinating and dysuria.   Musculoskeletal: Positive for back pain and neck pain.     Objective:     Vital Signs (Most Recent):  Temp: 96 °F (35.6 °C) (02/17/17 0800)  Pulse: 88 (02/17/17 0900)  Resp: 16 (02/17/17 0800)  BP: (!) 186/89 (02/17/17 0800)  SpO2: 95 % (02/17/17 0800) Vital Signs (24h Range):  Temp:  [96 °F (35.6 °C)-98 °F (36.7 °C)] 96 °F (35.6 °C)  Pulse:  [60-88] 88  Resp:  [16-18] 16  SpO2:  [95 %-96 %] 95 %  BP: (130-190)/(67-94) 186/89     Weight: 77.2 kg (170 lb 3.1 oz)  Body mass index is 21.84 kg/(m^2).    Intake/Output Summary (Last 24 hours) at 02/17/17 1004  Last data filed at 02/17/17 0800   Gross per 24 hour   Intake              714 ml   Output             1275 ml   Net             -561 ml      Physical Exam   Constitutional: He appears well-developed and well-nourished. No distress.   Neck:   C collar in place   Cardiovascular: Normal rate, regular rhythm, normal heart sounds and intact distal pulses.  Exam reveals no gallop and no friction rub.    No murmur heard.  Pulmonary/Chest: Effort normal and breath sounds normal. No respiratory distress. He has no wheezes. He has no rales.   Abdominal: Soft. Bowel sounds are normal. He exhibits no distension. There is no tenderness.       Significant Labs:   CBC:   Recent Labs  Lab 02/16/17  0425 02/17/17  0418   WBC 10.71 9.93   HGB 10.3* 10.0*   HCT 30.3* 29.4*    144*     CMP:   Recent Labs  Lab 02/16/17  0425 02/17/17  0418   * 126*   K 4.3 4.6   CL 90* 90*   CO2 26 25   * 182*   BUN  47* 51*   CREATININE 1.4 1.5*   CALCIUM 8.6* 8.8   PROT 6.1 6.0   ALBUMIN 2.5* 2.5*   BILITOT 0.5 0.6   ALKPHOS 238* 230*   AST 43* 47*   ALT 29 34   ANIONGAP 10 11   EGFRNONAA 53.1* 48.8*       Significant Imaging: I have reviewed all pertinent imaging results/findings within the past 24 hours.

## 2017-02-17 NOTE — PROGRESS NOTES
Ortho/Spine Postop Progress Note    Postop day: 4    ID: The patient is a 61 y.o. male status post: pending C5-C6 corpectomy with C4-7 anterior cervical fusion for myelopathy    Overnight Events: no acute events.  Pain controlled.  +flatus.  + urination    Vitals:    02/17/17 0500   BP:    Pulse: 71   Resp:    Temp:        Drain Output  02/15 1901 - 02/16 1900  In: 510   Out: 1150     Physical Exam:  NAD, A/O x 3.  Wound c/d/i with small amount serosanguinous drainage on dressing - changed 2/17  Biceps, Triceps, brachioradialis, wrist flexion/extension, finger abduction 4/5 bilaterally.  sensation intact but decreased bilaterally  Left foot numbness.  4+/5 ankle df/pf.  LEFT EHL 3/5    Assessment: The patient is a 61 y.o. male status post: day of surgery for C5 possible C6 corpectomy for metastatic C5 burst fracture    Plan:  1) Antibiotics: ancef postop  2) Weight bearing status: wbat in c collar  3) Labs: H/H reviewed  4) DVT Prophylaxis: mechanical.  heparin 5k q8.  5) Lines/Drains: PIV.    6) Dispo:  to Rehab today.  Hyponatremia and hypertension per medicine - appreciate assistance.    Abhijit Burnett MD  Orthopedic Surgery PGY-4  [phone] pager

## 2017-02-18 LAB
POCT GLUCOSE: 110 MG/DL (ref 70–110)
POCT GLUCOSE: 115 MG/DL (ref 70–110)
POCT GLUCOSE: 140 MG/DL (ref 70–110)
POCT GLUCOSE: 142 MG/DL (ref 70–110)
POCT GLUCOSE: 192 MG/DL (ref 70–110)
POCT GLUCOSE: 59 MG/DL (ref 70–110)
POCT GLUCOSE: 69 MG/DL (ref 70–110)

## 2017-02-18 PROCEDURE — 97163 PT EVAL HIGH COMPLEX 45 MIN: CPT

## 2017-02-18 PROCEDURE — 97110 THERAPEUTIC EXERCISES: CPT

## 2017-02-18 PROCEDURE — 12800000 HC REHAB SEMI-PRIVATE ROOM

## 2017-02-18 PROCEDURE — 25000003 PHARM REV CODE 250: Performed by: PHYSICAL MEDICINE & REHABILITATION

## 2017-02-18 PROCEDURE — 97530 THERAPEUTIC ACTIVITIES: CPT

## 2017-02-18 PROCEDURE — 63600175 PHARM REV CODE 636 W HCPCS: Performed by: ORTHOPAEDIC SURGERY

## 2017-02-18 PROCEDURE — 99233 SBSQ HOSP IP/OBS HIGH 50: CPT | Mod: ,,, | Performed by: PHYSICAL MEDICINE & REHABILITATION

## 2017-02-18 PROCEDURE — 63600175 PHARM REV CODE 636 W HCPCS: Performed by: PHYSICAL MEDICINE & REHABILITATION

## 2017-02-18 PROCEDURE — 25000003 PHARM REV CODE 250: Performed by: ORTHOPAEDIC SURGERY

## 2017-02-18 PROCEDURE — 97167 OT EVAL HIGH COMPLEX 60 MIN: CPT

## 2017-02-18 PROCEDURE — 97535 SELF CARE MNGMENT TRAINING: CPT

## 2017-02-18 RX ADMIN — HEPARIN SODIUM 5000 UNITS: 5000 INJECTION, SOLUTION INTRAVENOUS; SUBCUTANEOUS at 05:02

## 2017-02-18 RX ADMIN — GABAPENTIN 300 MG: 300 CAPSULE ORAL at 09:02

## 2017-02-18 RX ADMIN — OXYCODONE HYDROCHLORIDE 15 MG: 5 TABLET ORAL at 05:02

## 2017-02-18 RX ADMIN — GABAPENTIN 300 MG: 300 CAPSULE ORAL at 01:02

## 2017-02-18 RX ADMIN — TAMSULOSIN HYDROCHLORIDE 0.4 MG: 0.4 CAPSULE ORAL at 07:02

## 2017-02-18 RX ADMIN — INSULIN ASPART 6 UNITS: 100 INJECTION, SOLUTION INTRAVENOUS; SUBCUTANEOUS at 07:02

## 2017-02-18 RX ADMIN — CARVEDILOL 25 MG: 25 TABLET, FILM COATED ORAL at 07:02

## 2017-02-18 RX ADMIN — HEPARIN SODIUM 5000 UNITS: 5000 INJECTION, SOLUTION INTRAVENOUS; SUBCUTANEOUS at 09:02

## 2017-02-18 RX ADMIN — AMLODIPINE BESYLATE 10 MG: 10 TABLET ORAL at 07:02

## 2017-02-18 RX ADMIN — OXYCODONE HYDROCHLORIDE 15 MG: 5 TABLET ORAL at 06:02

## 2017-02-18 RX ADMIN — HYDROMORPHONE HYDROCHLORIDE 1 MG: 1 INJECTION, SOLUTION INTRAMUSCULAR; INTRAVENOUS; SUBCUTANEOUS at 07:02

## 2017-02-18 RX ADMIN — FAMOTIDINE 20 MG: 20 TABLET, FILM COATED ORAL at 07:02

## 2017-02-18 RX ADMIN — OXYCODONE HYDROCHLORIDE 15 MG: 5 TABLET ORAL at 10:02

## 2017-02-18 RX ADMIN — Medication 16 G: at 11:02

## 2017-02-18 RX ADMIN — INSULIN DETEMIR 18 UNITS: 100 INJECTION, SOLUTION SUBCUTANEOUS at 07:02

## 2017-02-18 RX ADMIN — STANDARDIZED SENNA CONCENTRATE AND DOCUSATE SODIUM 1 TABLET: 8.6; 5 TABLET, FILM COATED ORAL at 07:02

## 2017-02-18 RX ADMIN — OXYCODONE HYDROCHLORIDE 10 MG: 10 TABLET, FILM COATED, EXTENDED RELEASE ORAL at 09:02

## 2017-02-18 RX ADMIN — QUINAPRIL 20 MG: 20 TABLET ORAL at 10:02

## 2017-02-18 RX ADMIN — GABAPENTIN 300 MG: 300 CAPSULE ORAL at 05:02

## 2017-02-18 RX ADMIN — HEPARIN SODIUM 5000 UNITS: 5000 INJECTION, SOLUTION INTRAVENOUS; SUBCUTANEOUS at 01:02

## 2017-02-18 RX ADMIN — OXYCODONE HYDROCHLORIDE 15 MG: 5 TABLET ORAL at 09:02

## 2017-02-18 RX ADMIN — Medication 16 G: at 04:02

## 2017-02-18 NOTE — PROGRESS NOTES
Physical Therapy   Admission FIM scores( PT)    Rah Puente   MRN: [id number]                02/18/17 1149   Transfers   Bed/Chair/WC 2   Toilet 2   Tub 1   Locomotion   Distance Walked 1   Distance Wheelchair 1   Walk 1   Wheelchair 1   Mode C   Stairs 1             Jan Blanco, PT 2/18/2017

## 2017-02-18 NOTE — PROGRESS NOTES
BLADDER  [] Pt. uses toilet (7)  [] Pt. is on dialysis - does not urinate (7)  [] Pt. uses bedside commode (5)  []Pt. uses bedpan - staff does____% (includes rolling on/off, holding bedpan in place                                                                   and emptying pan)   [x] Pt. uses urinal - staff empties                          []   Pt. needs help with positioning the urinal (4)                          []   Pt. needs help holding the urinal (2)  []  Pt. has nguyen catheter/suprapubic catheter - staff empties (1)  []  Pt. Is on ICP program:                           []  Pt. does catheterization (6)                           [] Staff does catheterization (1)  [] Pt. Is incontinent - staff does _______% of tasks  Number of accidents during this shift ______    BOWEL  [] Pt. uses toilet (7)  [] Pt. uses bedside commode (5)  [x] Pt. uses bedpan - staff does ___60____% of task  [] Pt. is on bowel program::                         []   Pt. inserts suppository (6)                         []   Nurse inserts suppository (4)                         []  Nurse does dig. stim. (1)  [] Pt. has colostomy - staff maintains care and empties (1)                       Pt. does _____% of care  [] Pt. is incontinent - staff does ______% of tasks  [] No bowel movement during this shift  [] Number of accidents during this shift    EATING  [] Pt. opens packages, cuts food, pours liquids, and feeds self, regular consistency (7)  [] Pt. needs dentures, swallow technique, special foods or drink consistency (6)  [] Pt. needs supervision (cueing), setup (open containers, cut food, etc.) (5)  []Pt. needs assist with occasional scooping, or checking for food pocketing (75-90%) (4)  []Pt. needs assist to lead each bite on utensils, patient brings food to mouth (50-74%)(3)  []Pt. needs assist to scoop, bring food to mouth (hand over hand) (25-49%) (2)  []AxillaryPt. Is receiving IV fluids for hydration or tube feeding  (1)    GROOMING  []Pt. performs all tasks independently and safely (7)  []Pt. obtains all articles, needs adaptive/assistive device (wash mitt, etc.) (6)  []Pt. needs supervision (cueing), setup (5)  []Pt. doing 3 of 4 or 4 of 5 tasks, needs assist to put in or remove dentures, steadying (Patient doing 75-90%) (4)  []Pt. doing 2 of 4 or 3 of 5 tasks (3)  []Pt. doing 1 of 4 of 2 of 5 tasks (25-49%) (2)  []Pt. doing 0 of tasks, needs assistance of 2 helpers (1)  []Activity occurred with OT    BATHING  []Pt. safety bathes whole body (10 parts of 10) (7)  []Pt. doing 10 of 10 body parts, uses adaptive devices (wash mitt, etc.) (6)  []Pt. doing 10 of 10 body parts or needs supervision or setup (5)  []Pt. doing 8-9 of 10 body parts (75-99%) (4)  []Pt. doing 5-7 of 10 body parts (50-74%) (3)  []Pt. doing 3-4 of 10 body parts (25-49%( (2)  []Pt. doing 0-2 of 10 body parts (0-24%0 (1)  []Activity occurred with OT    DRESSING UPPER BODY  []Pt. dresses or undresses independently, obtaining clothes from storage area (7)  []Pt. needs adaptive devices (6)  []Pt. needs supervision (cueing), setup (5)  []Pt. doing 75-99% (4)  []Pt. doing 50-74% (3)  []Pt. doing 25-49% (2)  []Pt. doing 0-24% or needs assist of 2 helpers (1)  []Activity occurred with OT    DRESSING LOWER BODY  []Pt. dresses or undresses independently (7)  []Pt. needs adaptive devices (6)  []Pt. needs supervision (cueing), set up helper applies DARIO hose or AFO (5)  []Pt. doing 75-99%, needs steadying assist, fastening a belt, etc.) (4)  []Pt. doing 50-74% (3)  []Pt. doing 25-49% (2)  []Pt. doing 0-24% or needs assist of 2 helpers (1)    TOILETING  []Pt. doing 3 of 3 tasks (pulling down pants, cleaning kendrick area, pulling up pants) (7)  []Pt. doing all 3 parts but needs assistive device (grab bar) (6)  []Pt. needs supervision or setup (5)  []Pt. doing 3 of 3 parts (75-99%) (4)  []Pt. doing 2 of 3 parts (50-74%) (3)  []Pt. doing 1 of 3 parts (25.49%) (2)  []Pt. needs  assist (more than steadying) with all 3 parts or needs assist of 2 helpers,  Incontinent of B/B today (0-24%) (1)    BED/ CHAIR/WHEELCHAIR TRANSFER  [x]Pt. transfers without assistive device into and out of wheelchair/bed/chair (7)  []Pt. uses assistive/adaptive devices (grab bars, sliding board, walker, bed rails,   wheelchair arm rests, etc.) (6)  []Pt. needs supervision, cues, head of bed raised by staff (5)  [x]Pt. needs steadying assist with any or all parts of transfer, needs assist with one   leg during transfer ( 4)  [x]Pt. needs lifting or lowering, needs assist with 2 legs during transfer (3)  [x]Pt. needs lifting and lowering (2)  [x]Pt. needs assist of 2 helpers(even if 2nd person is just there for safety) or mechanical lift (1)  []Activity did not occur     TOILET TRANSFER  []Pt. transfers from wheelchair or walking without assistive device (7)  []Pt. uses assistive/adaptive device (commode, grab bars, sliding board, walker,   prosthesis, orthotic, raised toilet seat, etc.) (6)  []Pt. needs supervision, cues, or setup (needs assist to lock brakes, remove leg or arm  rest, position sliding board) (5)  []Pt. needs steadying assist with any or all parts of transfer, needs assist with one leg  during transfer (4)  []Pt. needs lifting or lowering, needs assist with two legs during transfer (3)  []Pt. needs lifting and lowering (2)  []Pt. needs assist of 2 helpers or mechanical lift (1)  [x]Activity did not occur    SHOWER TRANSFER  []Pt. transfers without assistive device into and out of shower (7)  []Pt. uses assistive/adaptive device (shower bench, grab bars, etc.) (6)  []Pt. needs supervision, cues, or setup (5)  []Pt. needs steadying assist with any or all parts of transfer, needs assist with one leg  during transfer (4)  []Pt. needs lifting or lowering, needs assist with 2 legs during transfer (3)  []Pt. needs lifting and lowering (2)  []Pt. needs assist of 2 helpers, helper pushes shower chair on  wheels in and out of  shower (1)

## 2017-02-18 NOTE — PLAN OF CARE
Problem: Patient Care Overview  Goal: Plan of Care Review  Outcome: Ongoing (interventions implemented as appropriate)  Plan of care reviewed.    Problem: Diabetes, Type 2 (Adult)  Intervention: Support/Optimize Psychosocial Response to Condition  Is compliant with treatment.      Problem: Fall Risk (Adult)  Intervention: Monitor/Assist with Self Care  Reminded to call for assistance when needed. Bed alarm remains activated.

## 2017-02-18 NOTE — PROGRESS NOTES
cbg = 59 prior to lunch (went up to 110 after glucose tabs) and 69 before dinner (will re-check cbg). Prandial novolog held both times too. No symptoms with the hypoglycemia.

## 2017-02-18 NOTE — PROGRESS NOTES
BLADDER  [] Pt. uses toilet (7)  [] Pt. is on dialysis - does not urinate (7)  [] Pt. uses bedside commode (5)  []Pt. uses bedpan - staff does____% (includes rolling on/off, holding bedpan in place                                                                   and emptying pan)   [x] Pt. uses urinal - staff empties                          []   Pt. needs help with positioning the urinal (4)                          []   Pt. needs help holding the urinal (2)  []  Pt. has nguyen catheter/suprapubic catheter - staff empties (1)  []  Pt. Is on ICP program:                           []  Pt. does catheterization (6)                           [] Staff does catheterization (1)  [] Pt. Is incontinent - staff does _______% of tasks  Number of accidents during this shift ______    BOWEL  [] Pt. uses toilet (7)  [x] Pt. uses bedside commode (5)  [] Pt. uses bedpan - staff does _______% of task  [] Pt. is on bowel program::                         []   Pt. inserts suppository (6)                         []   Nurse inserts suppository (4)                         []  Nurse does dig. stim. (1)  [] Pt. has colostomy - staff maintains care and empties (1)                       Pt. does _____% of care  [] Pt. is incontinent - staff does ______% of tasks  [] No bowel movement during this shift  [] Number of accidents during this shift    EATING  [x] Pt. opens packages, cuts food, pours liquids, and feeds self, regular consistency (7)  [] Pt. needs dentures, swallow technique, special foods or drink consistency (6)  [] Pt. needs supervision (cueing), setup (open containers, cut food, etc.) (5)  []Pt. needs assist with occasional scooping, or checking for food pocketing (75-90%) (4)  []Pt. needs assist to lead each bite on utensils, patient brings food to mouth (50-74%)(3)  []Pt. needs assist to scoop, bring food to mouth (hand over hand) (25-49%) (2)  []AxillaryPt. Is receiving IV fluids for hydration or tube feeding  (1)    GROOMING  [x]Pt. performs all tasks independently and safely (7)  []Pt. obtains all articles, needs adaptive/assistive device (wash mitt, etc.) (6)  []Pt. needs supervision (cueing), setup (5)  []Pt. doing 3 of 4 or 4 of 5 tasks, needs assist to put in or remove dentures, steadying (Patient doing 75-90%) (4)  []Pt. doing 2 of 4 or 3 of 5 tasks (3)  []Pt. doing 1 of 4 of 2 of 5 tasks (25-49%) (2)  []Pt. doing 0 of tasks, needs assistance of 2 helpers (1)  []Activity occurred with OT    BATHING  []Pt. safety bathes whole body (10 parts of 10) (7)  []Pt. doing 10 of 10 body parts, uses adaptive devices (wash mitt, etc.) (6)  []Pt. doing 10 of 10 body parts or needs supervision or setup (5)  [x]Pt. doing 8-9 of 10 body parts (75-99%) (4)  []Pt. doing 5-7 of 10 body parts (50-74%) (3)  []Pt. doing 3-4 of 10 body parts (25-49%( (2)  []Pt. doing 0-2 of 10 body parts (0-24%0 (1)  []Activity occurred with OT    DRESSING UPPER BODY  []Pt. dresses or undresses independently, obtaining clothes from storage area (7)  [x]Pt. needs adaptive devices (6)  []Pt. needs supervision (cueing), setup (5)  []Pt. doing 75-99% (4)  []Pt. doing 50-74% (3)  []Pt. doing 25-49% (2)  []Pt. doing 0-24% or needs assist of 2 helpers (1)  []Activity occurred with OT    DRESSING LOWER BODY  []Pt. dresses or undresses independently (7)  [x]Pt. needs adaptive devices (6)  []Pt. needs supervision (cueing), set up helper applies DARIO hose or AFO (5)  []Pt. doing 75-99%, needs steadying assist, fastening a belt, etc.) (4)  []Pt. doing 50-74% (3)  []Pt. doing 25-49% (2)  []Pt. doing 0-24% or needs assist of 2 helpers (1)    TOILETING  []Pt. doing 3 of 3 tasks (pulling down pants, cleaning kendrick area, pulling up pants) (7)  []Pt. doing all 3 parts but needs assistive device (grab bar) (6)  []Pt. needs supervision or setup (5)  [x]Pt. doing 3 of 3 parts (75-99%) (4)  []Pt. doing 2 of 3 parts (50-74%) (3)  []Pt. doing 1 of 3 parts (25.49%) (2)  []Pt.  needs assist (more than steadying) with all 3 parts or needs assist of 2 helpers,  Incontinent of B/B today (0-24%) (1)    BED/ CHAIR/WHEELCHAIR TRANSFER  []Pt. transfers without assistive device into and out of wheelchair/bed/chair (7)  []Pt. uses assistive/adaptive devices (grab bars, sliding board, walker, bed rails,   wheelchair arm rests, etc.) (6)  []Pt. needs supervision, cues, head of bed raised by staff (5)  [x]Pt. needs steadying assist with any or all parts of transfer, needs assist with one   leg during transfer ( 4)  []Pt. needs lifting or lowering, needs assist with 2 legs during transfer (3)  []Pt. needs lifting and lowering (2)  []Pt. needs assist of 2 helpers(even if 2nd person is just there for safety) or mechanical lift (1)  []Activity did not occur     TOILET TRANSFER  []Pt. transfers from wheelchair or walking without assistive device (7)  []Pt. uses assistive/adaptive device (commode, grab bars, sliding board, walker,   prosthesis, orthotic, raised toilet seat, etc.) (6)  []Pt. needs supervision, cues, or setup (needs assist to lock brakes, remove leg or arm  rest, position sliding board) (5)  [x]Pt. needs steadying assist with any or all parts of transfer, needs assist with one leg  during transfer (4)  []Pt. needs lifting or lowering, needs assist with two legs during transfer (3)  []Pt. needs lifting and lowering (2)  []Pt. needs assist of 2 helpers or mechanical lift (1)  []Activity did not occur    SHOWER TRANSFER  []Pt. transfers without assistive device into and out of shower (7)  []Pt. uses assistive/adaptive device (shower bench, grab bars, etc.) (6)  []Pt. needs supervision, cues, or setup (5)  [x]Pt. needs steadying assist with any or all parts of transfer, needs assist with one leg  during transfer (4)  []Pt. needs lifting or lowering, needs assist with 2 legs during transfer (3)  []Pt. needs lifting and lowering (2)  []Pt. needs assist of 2 helpers, helper pushes shower chair on  wheels in and out of  shower (1)
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
## 2017-02-18 NOTE — PROGRESS NOTES
Occupational Therapy   FIm scores    Rah Puente  MRN: [id number]  Room/Bed: E257/E257 B       02/18/17 1600   Transfers   Bed/Chair/WC 2   Toilet 2   Tub 1   Self Care   Eating 5   Grooming 2   Bathing 2   Dressing-Upper 2   Dressing-Lower 1   Toileting 1   Communication   Comprehension 4   Mode B   Expression 4   Mode B   Social Cognition   Social Interaction 4   Problem Solving 4   Memory 4       ZORAIDA Velasquez  2/18/2017    LEGEND:   CGA: Contact Guard Assist   EOB: Edge of Bed   HHA: Hand Held Assist   HOB: Head of Bed   (I): Independent-patient performs task in a timely manner   Max (A): Maximal Assist-patient performs 25-49% of task   Min (A): Minimal Assist- patient performs 75% or more of task   Mod (A): Moderate Assist- patient performs 50-74% of task   NA: Not applicable   NT: Not tested   OOB: Out of Bed   PTA: Prior to admit   QC: Quad Cane   RW: Rolling Walker   (S): Supervision- patient requires cues, coaxing, prompting   SBA: Stand By Assist   SC: Straight Cane   SW: Standard Walker   TBA: To be assessed   Total (A): Total Assist- patient performs less than 25% of task   WC: Wheelchair   WFL: Within Functional Limits   WNL: Within Normal Limits

## 2017-02-18 NOTE — PLAN OF CARE
Problem: Patient Care Overview  Goal: Plan of Care Review  Outcome: Ongoing (interventions implemented as appropriate)  POC reviewed with pt who verbalized understanding. Pt AAOX4.  Remains free of falls and injury. VSS and afebrile.   C/o slight pain controlled with PRN medication. Encouraged pt repositioned to help decrease pressure. Pt sleeping throughout the night. Infection control measures taken, such as hand washing and proper disposal of contaminated materials. Safety rounds maintained according to protocol, environmental consistency maintained. Rah Puente agrees to call when assistance is needed per call light which is within reach. No acute events. No distress noted. WCTM.         Problem: Diabetes, Type 2 (Adult)  Goal: Signs and Symptoms of Listed Potential Problems Will be Absent, Minimized or Managed (Diabetes, Type 2)  Signs and symptoms of listed potential problems will be absent, minimized or managed by discharge/transition of care (reference Diabetes, Type 2 (Adult) CPG).   Outcome: Ongoing (interventions implemented as appropriate)  PT'S DM IS MANAGED THROUGH DIET AND MEDICATION.  BLOOD GLUCOSE IS MONITORED AC/HS AND NOVOLOG IS ADMIN PRN PER MD'S SLIDING SCALE ORDERS.  WILL CONTINUE TO MONITOR.        Problem: Fall Risk (Adult)  Goal: Identify Related Risk Factors and Signs and Symptoms  Related risk factors and signs and symptoms are identified upon initiation of Human Response Clinical Practice Guideline (CPG)   Outcome: Ongoing (interventions implemented as appropriate)  Patient able to understand, repeat, and comply with safety interventions (such as nonskid socks, low bed, call light in reach, etc).  Patient taught signs and symptoms of orthostatic hypotension and fall preventive measures.  Reflective lighting from bathroom used to minimize disorientation during the night while awaking.  Personal items within reach on personal table.

## 2017-02-18 NOTE — PROGRESS NOTES
Occupational Therapy   Evaluation/ Treatment    Rah Puente  MRN: [id number]  Room/Bed: E257/E257 B     02/18/17 0730   OT Time Calculation   OT Start Time 0730   OT Stop Time 0900   OT Total Time (min) 90 min   General   OT Date of Treatment 02/18/17   Chart Reviewed Yes   Onset of Illness/Injury or Date of Surgery 02/09/17   Diagnosis cervical myelopathy, s/p C5-C6 corpectomy with C4-C7 anterior fusion 2/13/17   Additional Pertinent History Past Medical History   Diagnosis Date    Adenocarcinoma of colon 03/10/2016     s/p right colectomy with negative 12/12 lymph nodes    Alcohol abuse     Benign non-nodular prostatic hyperplasia without lower urinary tract symptoms 2/14/2017    Chronic hyponatremia 5/19/2014    Essential hypertension 5/19/2014    Malignant neoplasm metastatic to bilateral lungs 2/14/2017    Malignant neoplasm metastatic to bone 2/10/2017    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 Savage   Family/Caregiver Present No   Patient Found (position) Supine in bed   Pt found with cervical collar   Precautions   General Precautions diabetic;fall   Cervical Brace [place]   Visual/Auditory Vision impaired  (glasses)   BADL History   Bed Mobility/Transfers independent   Grooming independent   Bathing needs device   Upper Body Dressing independent   Lower Body Dressing independent   Toileting independent   IADL History   Homemaking Responsibilities Yes   Meal Prep Responsibility Primary   Driving License Yes   Occupation Full  "time employment   Type of Occupation  at [employer]   Leisure and Hobbies none stated- [employer]   Living Environment   Lives With alone   Living Arrangements house   Home Accessibility stairs to enter home   Home Layout Able to live on 1st floor   Number of Stairs to Enter Home 5   Stair Railings at Home inside, present at both sides   Living Environment Comment Prior to admit, pt. was living alone, 1 story home with 5 steps.  Pt. was (I) with all ADL, gait, working full time, driving.  Pt. does some cooking but has help with cooking, cleaning.  Pt. reports having a THR last year- has reacher, sock aide.  Pt. also has Tub shower combo at home, handicap height toilet.  Pt. reports he may have assist at d/c but not 24 hours.     Equipment Currently Used at Home bedside commode;cane, straight;bath bench;walker, rolling   Subjective   Patient states "I don't think I can do that.  The pain is too bad."  Pt. referring to rolling in bed.    Pain/Comfort   Pain Rating 8/10   Location - Side Bilateral   Location arm   Pain Addressed Pre-medicate for activity;Distraction   Cognitive Status   Level of Consciousness (AVPU) alert   Orientation person;place;situation   Able to Follow Commands (Communication) success, 1-step commands  (some confusion)   Speech follows commands  (Difficulty with word finding)   Mood/Behavior anxious;cooperative  (Cursing at times 2* pain)   Light Touch   LUE mild impairment  (Hand)   RUE moderate impairment  (Hand and forearm)   Range of Motion (ROM)   Range of Motion Examination bilateral upper extremity ROM was WFL  (PROM WFL to BUEs- ~ 90* at L shoulder-then pain)   Manual Muscle Testing (MMT)   Manual Muscle Testing Results (RUE- Shoulder  2-/5); IR/ER- 3/5; EF- 3+/5; EE- 4-/5; Hand- 3+/5  LUE- shoulder- 1/5; IR/ER 0/5; EF- 2+/5; EE- 4-/5; Hand-3+/5   Bed Mobility   Rolling/Turning to Left (NT- too painful at this time)   Rolling/Turning Right (NT- too " painful at this time)   Supine to Sit Minimum Assistance   Sit to Stand   Sit <> Stand Assistance Moderate Assistance  (Mod A from bed)   Sit <> Stand Assistive Device No Assistive Device   Stand to Sit   Assistance Moderate Assistance   Assistive Device No Assistive Device   Bed to Chair   Bed <> Chair Technique Stand Pivot   Bed <> Chair Transfer Assistance Maximum Assistance   Bed <> Chair Assistive Device No Assistive Device   Trials/Comments Assist to lift and lower   Tub Bench Transfer   Tub Bench Transfer Assistance Total Assistance   Trials/Comments Pt. has tub/shower combo.  Pt. would step into tub prior to admit.  Pt. is Max A to stand from w/c.  Unable to step into tub at this time.   Toilet Transfer   Toilet Transfer Assistance Maximum Assistance   Toilet Transfer Assistive Device No Assistive Device   Trials/Comments Assist to lift and lower   Feeding   Feeding Level of Assistance Set-up Assistance   Feeding Where Assessed Wheelchair   Feeding Comments Assist to open containers- pt. props R elbow on table to bring hand to mouth   Grooming   Grooming Level of Assistance Maximum assistance  (overall)   Hand Washing Level of Assistance Minimum assistance   Face Washing Level of Assistance Supervision   Grooming Where Assessed Standing sinkside   Grooming Comments Pt. washes face in sitting.  Per home situation, pt. stands to perform oral care, comb hair, shave.  Pt. is unable to perform grooming in standing at this time 2* pain, decreased ROM B shoulders   Bathing   Bathing Level of Assistance Maximum assistance   Bathing Where Assessed Wheelchair   Bathing Comments Assist to bathe Portions of trunk, RUE, kendrick area, B LEs   UE Dressing   UE Dressing Level of Assistance Maximum assistance   UE Dressing Where Assessed Wheelchair   UE Dressing Comments Assist to thread over LUE, guide around back, button shirt   LE Dressing   LE Dressing  Level of Assistance Total assistance   LE Dressing Level of Assistance  Total assistance  (Assist to thread over B lower legs, assist to pull up over hips in stance)   Sock Level of Assistance Total assistance   Toileting   Toileting Level of Assistance Total assistance   Toileting Where Assessed Bedside commode   Toileting Comments Assist with rear pericare and with clothing mgmt- pt. partially assists   Treatment   Treatment OT eval completed.  Pt. educated in the role of OT and the POC.  Participated in ADL trng., t/f trng.  Pt. was very limited today in eval 2* pain.  Pt. requires increased time and assist to complete ADLs.     Activity Tolerance   Activity Tolerance Patient limited by pain   After Treatment   Patient Position After Treatment Seated in wheelchair   Patient after treatment left call button in reach;nursing notified   Assessment   Prognosis Fair   Problem List Decreased Self Care skills;Decreased upper extremity range of motion;Decreased upper extremity strength;Decreased endurance;Decreased sensation;Decreased fine motor control;Decreased functional mobility;Decreased gross motor control;Decreased IADLs;Decreased Function of right upper extremity;Decreased Function of left upper extremity;Decreased trunk control for functional activities   Assessment OT eval completed.  Pt. presents with the above deficits, alos limited by pain, BUE weakness.  Pt. will benefit form inpatient OT to increase (I) and safety with ADLs, t/fs, functional mobility, UE ROM, strength.     Level of Motivation/Participation Good    Barriers to Discharge Home environment accessibility limitations;Decreased caregiver support   Short Term Goals   Time For Goal Achievement 7 days   Pt Will Perform Supine To Sit With stand by assist;New goal   Supine to Sit - Met/Not Met Not Met   Pt Will Transfer Sit to Stand With moderate assist;With RW;New goal   Transfer Sit to stand - Met/Not Met Not Met   Pt Will Transfer Bed/Chair With moderate assist;With RW;New goal   Transfer Bed/Chair - Met/Not Met Not  Met   Pt Will Perform Grooming With minimal assistance;In wheelchair;New goal   Grooming - Met/Not Met Not Met   Pt Will Perform Bathing With moderate assistance;In wheelchair;New goal   Bathing - Met/Not Met Not Met   Pt Will Perform UE Dressing With moderate assistance;At edge of bed;New goal   UE Dressing - Met/Not Met Not Met   Pt Will Perform LE Dressing With maximum assistance;With adaptive equipment;New goal   LE Dressing - Met/Not Met Not Met   Long Term Goals   Time For Goal Achievement 2 weeks   Pt Will Perform Supine To Sit Supervision;New goal   Pt Will Transfer Sit to Stand With minimal assist;With RW;New goal   Transfer Sit to stand - Met/Not Met Not Met   Pt Will Transfer Bed/Chair With minimal assist;New goal   Transfer Bed/Chair - Met/Not Met Not Met   Pt Will Transfer To Bedside Commode With minimal assist;New goal   Transfer Bedside Commode -Met/Not Met Not Met   Pt Will Transfer To Shower With minimal assist;With assistive device;New goal   Transfer to Shower-Met/Not Met Not Met   Pt Will Perform Eating With modified independence;New goal   Eating - Met/Not Met Not Met   Pt Will Perform Grooming With stand by assistance;In wheelchair;New goal   Grooming - Met/Not Met Not Met   Pt Will Perform UE Dressing With minimal assistance;At edge of bed;New goal   UE Dressing - Met/Not Met Not Met   Pt Will Perform LE Dressing With moderate assistance;With adaptive equipment;New goal   LE Dressing - Met/Not Met Not Met   Pt Will Perform Toileting With moderate assistance;With bedside commode;New goal   Toileting-Met/Not Met Not Met   Discharge Recommendations   Equipment Needed After Discharge wheelchair   Discharge Facility/Level Of Care Needs home health OT   Plan   Plan Self care retraining;Functional transfer training;UE thereex;Equipment Training;Family training;Safety training;Fine motor training;Functional endurance training;Patient education;AROM;AAROM;Functional Standing Activities   Therapy  Frequency 2 times/day;Monday-Friday;Saturday or Sunday   Occupational Therapy Follow-up   OT Follow-up? Yes   Treatment/Billable Minutes   Evaluation 60   Self Care/Home Management 30   Total Time 90   Expression:  - Expresses basic 75-89% of time - Needs to repeat words  (4)  Social Interaction:  - Interacts appropriately 75-89% of time - needs redirection for appropriate language or to initiate interaction  (4)  -     .Problem Solving:  - Solves basic problems 75-89% of the time  (4)  Memory:    - Recognizes, recalls, or executes 75-89% of time 2 steps of 3 step request  (4)    Comprehension:  Complex= humor, finances, rationale for medical treatment(hip precautions, pressure relief)  Basic= pain, hunger, thirst, bathroom needs, cold, nutrition, sleep    · Understands basic 75-89%- may need words repeated (4)    ZORAIDA Velasquez  2/18/2017

## 2017-02-18 NOTE — PROGRESS NOTES
Physical Therapy Discharge Summary    Rah Puente  MRN: [id number]   Malignant neoplasm metastatic to bone   Patient Discharged from acute Physical Therapy on 17.  Please refer to prior PT noted date on 17 for functional status.     Assessment:   Patient appropriate for care in another setting.  GOALS:   Physical Therapy Goals        Problem: Physical Therapy Goal    Goal Priority Disciplines Outcome Goal Variances Interventions   Physical Therapy Goal     PT/OT, PT Ongoing (interventions implemented as appropriate)     Description:  Goals to be met by: 17     Patient will increase functional independence with mobility by performin. Supine to sit with CGA-not met  2. Sit to supine with CGA-not met  3. Sit to stand transfer with RW with CGA-not met  4. Bed to chair transfer with CGA using Rolling Walker-not met  5. Gait  x 200 feet with Supervision using Rolling Walker-not met.   6. Ascend/descend 5 stair with bilateral Handrails minimal assist-not met.   7. Lower extremity exercise program x 20 reps per handout, with independence-not met.               Reasons for Discontinuation of Therapy Services  Transfer to alternate level of care.      Plan:  Patient Discharged to: Inpatient Rehab.

## 2017-02-18 NOTE — PROGRESS NOTES
Progress Note  Physical Medicine & Rehab    Admit Date: 2/17/2017   LOS: 1 day     SUBJECTIVE:     No new c/o.    No F/C.  No N/V.  No CP/SOB.  No dizziness/headaches.      OBJECTIVE:     Vital Signs (Most Recent)  Temp: 98.1 °F (36.7 °C) (02/18/17 0611)  Pulse: 75 (02/18/17 0611)  Resp: 16 (02/18/17 0611)  BP: (!) 176/78 (02/18/17 0611)  SpO2: 98 % (02/18/17 0611)    Vital Signs Range (Last 24H):  Temp:  [97.9 °F (36.6 °C)-98.1 °F (36.7 °C)]   Pulse:  [75-83]   Resp:  [16-17]   BP: (176-189)/(78-88)   SpO2:  [94 %-98 %]     I & O (Last 24H):  Intake/Output Summary (Last 24 hours) at 02/18/17 1713  Last data filed at 02/18/17 0800   Gross per 24 hour   Intake              660 ml   Output             1050 ml   Net             -390 ml     Physical Exam:    Physical Exam   Constitutional: He is oriented to person, place, and time. He appears well-nourished.   Eyes: EOM are normal. Pupils are equal, round, and reactive to light.   Neck: Neck supple.   Cardiovascular: Normal rate.   Pulmonary/Chest: Effort normal.   Abdominal: Soft.   Musculoskeletal:   BUE: AROM WNL  LLE: AROM WNL  Neurological: He is oriented to person, place, and time.    RUE: 4/5 at shoulder abduction, 4 elbow flexion, 4 elbow extension, 4 hand .   LUE: 4/5 at shoulder abduction, 4 elbow flexion, 4 elbow extension, 4 hand .   RLE: 4/5 at hip flexion, 4 knee extension, 4 ankle DF, 4 PF.   LLE: 4/5 at hip flexion, 4 knee extension, 4 ankle DF, 4 PF.    Skin: No rash noted.   Psychiatric: He has a normal mood and affect.       Labs:  Accuchecks: 59, 110, 69.  179 & 217 yesterday.       Assessment:      1. S/P C4-7 fusion with residual cervical myelopathy associated with impaired mobility and ability to perform ADLs. Ongoing OT/PT evaluation & treatment.  2. DM2 -- fluctuating control. To monitor and cover with SSI.  3. HTN -- suboptimal control. Too monitor and adjust BP medications as needed.  4. Neuropathic pain -- numbness/tingling of fingers  -- on gabapentin  5. CKD -- monitor  6. Chronic hyponatremia -- monitor

## 2017-02-19 LAB
POCT GLUCOSE: 139 MG/DL (ref 70–110)
POCT GLUCOSE: 178 MG/DL (ref 70–110)
POCT GLUCOSE: 220 MG/DL (ref 70–110)

## 2017-02-19 PROCEDURE — 63600175 PHARM REV CODE 636 W HCPCS: Performed by: PHYSICAL MEDICINE & REHABILITATION

## 2017-02-19 PROCEDURE — 25000003 PHARM REV CODE 250: Performed by: ORTHOPAEDIC SURGERY

## 2017-02-19 PROCEDURE — 99232 SBSQ HOSP IP/OBS MODERATE 35: CPT | Mod: ,,, | Performed by: PHYSICAL MEDICINE & REHABILITATION

## 2017-02-19 PROCEDURE — 12800000 HC REHAB SEMI-PRIVATE ROOM

## 2017-02-19 PROCEDURE — 25000003 PHARM REV CODE 250: Performed by: PHYSICAL MEDICINE & REHABILITATION

## 2017-02-19 RX ADMIN — CARVEDILOL 25 MG: 25 TABLET, FILM COATED ORAL at 08:02

## 2017-02-19 RX ADMIN — METHOCARBAMOL 750 MG: 750 TABLET ORAL at 08:02

## 2017-02-19 RX ADMIN — HYDROMORPHONE HYDROCHLORIDE 1 MG: 1 INJECTION, SOLUTION INTRAMUSCULAR; INTRAVENOUS; SUBCUTANEOUS at 02:02

## 2017-02-19 RX ADMIN — HEPARIN SODIUM 5000 UNITS: 5000 INJECTION, SOLUTION INTRAVENOUS; SUBCUTANEOUS at 05:02

## 2017-02-19 RX ADMIN — OXYCODONE HYDROCHLORIDE 15 MG: 5 TABLET ORAL at 08:02

## 2017-02-19 RX ADMIN — QUINAPRIL 20 MG: 20 TABLET ORAL at 09:02

## 2017-02-19 RX ADMIN — GABAPENTIN 300 MG: 300 CAPSULE ORAL at 01:02

## 2017-02-19 RX ADMIN — INSULIN ASPART 6 UNITS: 100 INJECTION, SOLUTION INTRAVENOUS; SUBCUTANEOUS at 12:02

## 2017-02-19 RX ADMIN — GABAPENTIN 300 MG: 300 CAPSULE ORAL at 05:02

## 2017-02-19 RX ADMIN — TAMSULOSIN HYDROCHLORIDE 0.4 MG: 0.4 CAPSULE ORAL at 09:02

## 2017-02-19 RX ADMIN — TAMSULOSIN HYDROCHLORIDE 0.4 MG: 0.4 CAPSULE ORAL at 08:02

## 2017-02-19 RX ADMIN — OXYCODONE HYDROCHLORIDE 15 MG: 5 TABLET ORAL at 04:02

## 2017-02-19 RX ADMIN — INSULIN ASPART 2 UNITS: 100 INJECTION, SOLUTION INTRAVENOUS; SUBCUTANEOUS at 12:02

## 2017-02-19 RX ADMIN — GABAPENTIN 300 MG: 300 CAPSULE ORAL at 09:02

## 2017-02-19 RX ADMIN — ZOLPIDEM TARTRATE 5 MG: 5 TABLET ORAL at 08:02

## 2017-02-19 RX ADMIN — OXYCODONE HYDROCHLORIDE 15 MG: 5 TABLET ORAL at 01:02

## 2017-02-19 RX ADMIN — INSULIN ASPART 6 UNITS: 100 INJECTION, SOLUTION INTRAVENOUS; SUBCUTANEOUS at 09:02

## 2017-02-19 RX ADMIN — HEPARIN SODIUM 5000 UNITS: 5000 INJECTION, SOLUTION INTRAVENOUS; SUBCUTANEOUS at 01:02

## 2017-02-19 RX ADMIN — FAMOTIDINE 20 MG: 20 TABLET, FILM COATED ORAL at 08:02

## 2017-02-19 RX ADMIN — AMLODIPINE BESYLATE 10 MG: 10 TABLET ORAL at 09:02

## 2017-02-19 RX ADMIN — INSULIN DETEMIR 18 UNITS: 100 INJECTION, SOLUTION SUBCUTANEOUS at 09:02

## 2017-02-19 RX ADMIN — OXYCODONE HYDROCHLORIDE 10 MG: 10 TABLET, FILM COATED, EXTENDED RELEASE ORAL at 08:02

## 2017-02-19 RX ADMIN — STANDARDIZED SENNA CONCENTRATE AND DOCUSATE SODIUM 1 TABLET: 8.6; 5 TABLET, FILM COATED ORAL at 09:02

## 2017-02-19 RX ADMIN — HYDROMORPHONE HYDROCHLORIDE 1 MG: 1 INJECTION, SOLUTION INTRAMUSCULAR; INTRAVENOUS; SUBCUTANEOUS at 05:02

## 2017-02-19 RX ADMIN — HEPARIN SODIUM 5000 UNITS: 5000 INJECTION, SOLUTION INTRAVENOUS; SUBCUTANEOUS at 09:02

## 2017-02-19 RX ADMIN — FAMOTIDINE 20 MG: 20 TABLET, FILM COATED ORAL at 09:02

## 2017-02-19 RX ADMIN — CARVEDILOL 25 MG: 25 TABLET, FILM COATED ORAL at 09:02

## 2017-02-19 NOTE — PLAN OF CARE
Problem: Patient Care Overview  Goal: Plan of Care Review  Outcome: Ongoing (interventions implemented as appropriate)  Pt care plan updated and individualized as needed and progress continues.  See associated flowsheets for relevant documentation. Frequent rounds made per protocol to maintain pt safety and meet pt needs.  Continuing to monitor and follow up as needed.  Pt compliant with wearing neck brace, and states he prefers to wear it in bed for comfort, as well as when out of be as ordered.  Pressure points monitored and skin intact around collar.    Problem: Fall Risk (Adult)  Goal: Absence of Falls  Patient will demonstrate the desired outcomes by discharge/transition of care.   Outcome: Ongoing (interventions implemented as appropriate)  Pt remains free from falls or trauma thus far this shift.        Problem: Pressure Ulcer Risk (Otis Scale) (Adult,Obstetrics,Pediatric)  Goal: Skin Integrity  Patient will demonstrate the desired outcomes by discharge/transition of care.   Outcome: Ongoing (interventions implemented as appropriate)  Pt turned and repositioned with assist as needed to maintain skin integrity.  No breakdown noted.

## 2017-02-19 NOTE — PROGRESS NOTES
BLADDER  [] Pt. uses toilet (7)  [] Pt. is on dialysis - does not urinate (7)  [] Pt. uses bedside commode (5)  []Pt. uses bedpan - staff does____% (includes rolling on/off, holding bedpan in place                                                                   and emptying pan)   [x] Pt. uses urinal - staff empties                          []   Pt. needs help with positioning the urinal (4)                          []   Pt. needs help holding the urinal (2)  []  Pt. has nguyen catheter/suprapubic catheter - staff empties (1)  []  Pt. Is on ICP program:                           []  Pt. does catheterization (6)                           [] Staff does catheterization (1)  [] Pt. Is incontinent - staff does _______% of tasks  Number of accidents during this shift ___0___    BOWEL  [] Pt. uses toilet (7)  [] Pt. uses bedside commode (5)  [] Pt. uses bedpan - staff does _______% of task  [] Pt. is on bowel program::                         []   Pt. inserts suppository (6)                         []   Nurse inserts suppository (4)                         []  Nurse does dig. stim. (1)  [] Pt. has colostomy - staff maintains care and empties (1)                       Pt. does _____% of care  [] Pt. is incontinent - staff does ______% of tasks  [x] No bowel movement during this shift  [] Number of accidents during this shift    EATING  [] Pt. opens packages, cuts food, pours liquids, and feeds self, regular consistency (7)  [] Pt. needs dentures, swallow technique, special foods or drink consistency (6)  [] Pt. needs supervision (cueing), setup (open containers, cut food, etc.) (5)  []Pt. needs assist with occasional scooping, or checking for food pocketing (75-90%) (4)  []Pt. needs assist to lead each bite on utensils, patient brings food to mouth (50-74%)(3)  []Pt. needs assist to scoop, bring food to mouth (hand over hand) (25-49%) (2)  []AxillaryPt. Is receiving IV fluids for hydration or tube feeding  (1)    GROOMING  []Pt. performs all tasks independently and safely (7)  []Pt. obtains all articles, needs adaptive/assistive device (wash mitt, etc.) (6)  []Pt. needs supervision (cueing), setup (5)  []Pt. doing 3 of 4 or 4 of 5 tasks, needs assist to put in or remove dentures, steadying (Patient doing 75-90%) (4)  []Pt. doing 2 of 4 or 3 of 5 tasks (3)  []Pt. doing 1 of 4 of 2 of 5 tasks (25-49%) (2)  []Pt. doing 0 of tasks, needs assistance of 2 helpers (1)  []Activity occurred with OT    BATHING  []Pt. safety bathes whole body (10 parts of 10) (7)  []Pt. doing 10 of 10 body parts, uses adaptive devices (wash mitt, etc.) (6)  []Pt. doing 10 of 10 body parts or needs supervision or setup (5)  []Pt. doing 8-9 of 10 body parts (75-99%) (4)  []Pt. doing 5-7 of 10 body parts (50-74%) (3)  []Pt. doing 3-4 of 10 body parts (25-49%( (2)  []Pt. doing 0-2 of 10 body parts (0-24%0 (1)  []Activity occurred with OT    DRESSING UPPER BODY  []Pt. dresses or undresses independently, obtaining clothes from storage area (7)  []Pt. needs adaptive devices (6)  []Pt. needs supervision (cueing), setup (5)  []Pt. doing 75-99% (4)  []Pt. doing 50-74% (3)  []Pt. doing 25-49% (2)  []Pt. doing 0-24% or needs assist of 2 helpers (1)  []Activity occurred with OT    DRESSING LOWER BODY  []Pt. dresses or undresses independently (7)  []Pt. needs adaptive devices (6)  []Pt. needs supervision (cueing), set up helper applies DARIO hose or AFO (5)  []Pt. doing 75-99%, needs steadying assist, fastening a belt, etc.) (4)  []Pt. doing 50-74% (3)  []Pt. doing 25-49% (2)  []Pt. doing 0-24% or needs assist of 2 helpers (1)    TOILETING  []Pt. doing 3 of 3 tasks (pulling down pants, cleaning kendrick area, pulling up pants) (7)  []Pt. doing all 3 parts but needs assistive device (grab bar) (6)  []Pt. needs supervision or setup (5)  []Pt. doing 3 of 3 parts (75-99%) (4)  []Pt. doing 2 of 3 parts (50-74%) (3)  []Pt. doing 1 of 3 parts (25.49%) (2)  [x]Pt. needs  assist (more than steadying) with all 3 parts or needs assist of 2 helpers,  Incontinent of B/B today (0-24%) (1)    BED/ CHAIR/WHEELCHAIR TRANSFER  []Pt. transfers without assistive device into and out of wheelchair/bed/chair (7)  []Pt. uses assistive/adaptive devices (grab bars, sliding board, walker, bed rails,   wheelchair arm rests, etc.) (6)  []Pt. needs supervision, cues, head of bed raised by staff (5)  []Pt. needs steadying assist with any or all parts of transfer, needs assist with one   leg during transfer ( 4)  []Pt. needs lifting or lowering, needs assist with 2 legs during transfer (3)  []Pt. needs lifting and lowering (2)  []Pt. needs assist of 2 helpers(even if 2nd person is just there for safety) or mechanical lift (1)  []Activity did not occur     TOILET TRANSFER  []Pt. transfers from wheelchair or walking without assistive device (7)  []Pt. uses assistive/adaptive device (commode, grab bars, sliding board, walker,   prosthesis, orthotic, raised toilet seat, etc.) (6)  []Pt. needs supervision, cues, or setup (needs assist to lock brakes, remove leg or arm  rest, position sliding board) (5)  [x]Pt. needs steadying assist with any or all parts of transfer, needs assist with one leg  during transfer (4)  []Pt. needs lifting or lowering, needs assist with two legs during transfer (3)  []Pt. needs lifting and lowering (2)  []Pt. needs assist of 2 helpers or mechanical lift (1)  []Activity did not occur    SHOWER TRANSFER  []Pt. transfers without assistive device into and out of shower (7)  []Pt. uses assistive/adaptive device (shower bench, grab bars, etc.) (6)  []Pt. needs supervision, cues, or setup (5)  []Pt. needs steadying assist with any or all parts of transfer, needs assist with one leg  during transfer (4)  []Pt. needs lifting or lowering, needs assist with 2 legs during transfer (3)  []Pt. needs lifting and lowering (2)  []Pt. needs assist of 2 helpers, helper pushes shower chair on wheels in  and out of  shower (1)

## 2017-02-19 NOTE — PROGRESS NOTES
Progress Note  Physical Medicine & Rehab    Admit Date: 2/17/2017   LOS: 2 days     SUBJECTIVE:     No new complaints. Neck pain better with pain medications prn.    No F/C.  No N/V.  No CP/SOB.  No dizziness/headaches.      OBJECTIVE:     Vital Signs (Most Recent)  Temp: 98.4 °F (36.9 °C) (02/19/17 0701)  Pulse: 93 (02/19/17 0701)  Resp: 16 (02/19/17 0701)  BP: 134/71 (02/19/17 0701)  SpO2: 97 % (02/19/17 0701)    Vital Signs Range (Last 24H):  Temp:  [98.2 °F (36.8 °C)-98.4 °F (36.9 °C)]   Pulse:  [87-93]   Resp:  [16]   BP: (134-183)/(71-88)   SpO2:  [97 %-98 %]     I & O (Last 24H):  Intake/Output Summary (Last 24 hours) at 02/19/17 1544  Last data filed at 02/19/17 0523   Gross per 24 hour   Intake              720 ml   Output              400 ml   Net              320 ml     Physical Exam:    Constitutional: He is oriented to person, place, and time. He appears well-nourished.   Eyes: EOM are normal. .   Neck: wearing C-collar..   Cardiovascular: Normal rate.   Pulmonary/Chest: Effort normal.   Abdominal: Soft.   Musculoskeletal:   BUE: AROM WNL  LLE: AROM WNL  Neurological: He is oriented to person, place, and time.   RUE: 4/5 at shoulder abduction, 4 elbow flexion, 4 elbow extension, 4 hand .  LUE: 4/5 at shoulder abduction, 4 elbow flexion, 4 elbow extension, 4 hand .  RLE: 4/5 at hip flexion, 4 knee extension, 4 ankle DF, 4 PF.  LLE: 4/5 at hip flexion, 4 knee extension, 4 ankle DF, 4 PF.    Skin: No rash noted.   Psychiatric: He has a normal mood and affect.        Labs:  Accuchecks: 139, 220 today.         Assessment:       1. S/P C4-7 fusion with residual cervical myelopathy associated with impaired mobility and ability to perform ADLs. Ongoing OT/PT evaluation & treatment.  2. DM2 -- fluctuating control. To monitor and cover with SSI.  3. HTN -- suboptimal control. To monitor and adjust BP medications as needed.  4. Neuropathic pain -- numbness/tingling of fingers -- on gabapentin  5. CKD --  monitor  6. Chronic hyponatremia -- monitor

## 2017-02-20 LAB
ANION GAP SERPL CALC-SCNC: 9 MMOL/L
BUN SERPL-MCNC: 58 MG/DL
CALCIUM SERPL-MCNC: 8.6 MG/DL
CHLORIDE SERPL-SCNC: 96 MMOL/L
CO2 SERPL-SCNC: 27 MMOL/L
CREAT SERPL-MCNC: 1.9 MG/DL
EST. GFR  (AFRICAN AMERICAN): 42.4 ML/MIN/1.73 M^2
EST. GFR  (NON AFRICAN AMERICAN): 36.7 ML/MIN/1.73 M^2
GLUCOSE SERPL-MCNC: 57 MG/DL
POCT GLUCOSE: 104 MG/DL (ref 70–110)
POCT GLUCOSE: 117 MG/DL (ref 70–110)
POCT GLUCOSE: 57 MG/DL (ref 70–110)
POCT GLUCOSE: 71 MG/DL (ref 70–110)
POCT GLUCOSE: 79 MG/DL (ref 70–110)
POTASSIUM SERPL-SCNC: 4.7 MMOL/L
SODIUM SERPL-SCNC: 132 MMOL/L

## 2017-02-20 PROCEDURE — 25000003 PHARM REV CODE 250: Performed by: PHYSICAL MEDICINE & REHABILITATION

## 2017-02-20 PROCEDURE — 12800000 HC REHAB SEMI-PRIVATE ROOM

## 2017-02-20 PROCEDURE — 97150 GROUP THERAPEUTIC PROCEDURES: CPT

## 2017-02-20 PROCEDURE — 97110 THERAPEUTIC EXERCISES: CPT

## 2017-02-20 PROCEDURE — 97535 SELF CARE MNGMENT TRAINING: CPT

## 2017-02-20 PROCEDURE — 97530 THERAPEUTIC ACTIVITIES: CPT

## 2017-02-20 PROCEDURE — 99233 SBSQ HOSP IP/OBS HIGH 50: CPT | Mod: ,,, | Performed by: PHYSICAL MEDICINE & REHABILITATION

## 2017-02-20 PROCEDURE — 80048 BASIC METABOLIC PNL TOTAL CA: CPT

## 2017-02-20 PROCEDURE — 97116 GAIT TRAINING THERAPY: CPT

## 2017-02-20 PROCEDURE — 25000003 PHARM REV CODE 250: Performed by: ORTHOPAEDIC SURGERY

## 2017-02-20 PROCEDURE — 36415 COLL VENOUS BLD VENIPUNCTURE: CPT

## 2017-02-20 RX ORDER — INSULIN ASPART 100 [IU]/ML
4 INJECTION, SOLUTION INTRAVENOUS; SUBCUTANEOUS
Status: DISCONTINUED | OUTPATIENT
Start: 2017-02-20 | End: 2017-03-03

## 2017-02-20 RX ADMIN — GABAPENTIN 300 MG: 300 CAPSULE ORAL at 05:02

## 2017-02-20 RX ADMIN — GABAPENTIN 300 MG: 300 CAPSULE ORAL at 09:02

## 2017-02-20 RX ADMIN — STANDARDIZED SENNA CONCENTRATE AND DOCUSATE SODIUM 1 TABLET: 8.6; 5 TABLET, FILM COATED ORAL at 09:02

## 2017-02-20 RX ADMIN — TAMSULOSIN HYDROCHLORIDE 0.4 MG: 0.4 CAPSULE ORAL at 07:02

## 2017-02-20 RX ADMIN — INSULIN DETEMIR 18 UNITS: 100 INJECTION, SOLUTION SUBCUTANEOUS at 08:02

## 2017-02-20 RX ADMIN — HEPARIN SODIUM 5000 UNITS: 5000 INJECTION, SOLUTION INTRAVENOUS; SUBCUTANEOUS at 05:02

## 2017-02-20 RX ADMIN — TAMSULOSIN HYDROCHLORIDE 0.4 MG: 0.4 CAPSULE ORAL at 09:02

## 2017-02-20 RX ADMIN — CARVEDILOL 25 MG: 25 TABLET, FILM COATED ORAL at 09:02

## 2017-02-20 RX ADMIN — FAMOTIDINE 20 MG: 20 TABLET, FILM COATED ORAL at 09:02

## 2017-02-20 RX ADMIN — AMLODIPINE BESYLATE 10 MG: 10 TABLET ORAL at 09:02

## 2017-02-20 RX ADMIN — HEPARIN SODIUM 5000 UNITS: 5000 INJECTION, SOLUTION INTRAVENOUS; SUBCUTANEOUS at 09:02

## 2017-02-20 RX ADMIN — OXYCODONE HYDROCHLORIDE 15 MG: 5 TABLET ORAL at 05:02

## 2017-02-20 RX ADMIN — HEPARIN SODIUM 5000 UNITS: 5000 INJECTION, SOLUTION INTRAVENOUS; SUBCUTANEOUS at 01:02

## 2017-02-20 RX ADMIN — OXYCODONE HYDROCHLORIDE 10 MG: 10 TABLET, FILM COATED, EXTENDED RELEASE ORAL at 08:02

## 2017-02-20 RX ADMIN — QUINAPRIL 20 MG: 20 TABLET ORAL at 09:02

## 2017-02-20 RX ADMIN — CARVEDILOL 25 MG: 25 TABLET, FILM COATED ORAL at 07:02

## 2017-02-20 RX ADMIN — GABAPENTIN 300 MG: 300 CAPSULE ORAL at 01:02

## 2017-02-20 RX ADMIN — FAMOTIDINE 20 MG: 20 TABLET, FILM COATED ORAL at 07:02

## 2017-02-20 NOTE — PROGRESS NOTES
Physical Therapy   Treatment    Rah Puente   MRN: [id number]   PTA visit #: 1   02/20/17 1355   PT Time Calculation   PT Start Time 1355   PT Stop Time 1445   PT Total Time (min) 50 min   Treatment   Treatment Type Treatment   PT/PTA PTA   PTA Visit Number 1   General   PT Received On 02/20/17   Family/Caregiver Present No   Patient Found (position) Seated in wheelchair   Pt found with cervical collar   Precautions   General Precautions diabetic;fall   Required Braces or Orthoses Yes   Cervical Brace [place]   Visual/Auditory Vision impaired   Subjective   Patient states Pt req encouragement to participate in therapy   Pain/Comfort   Pain Rating no pain   Bed Mobility   Bed Mobility yes   Sit to Supine Minimum Assistance   Transfers   Transfer yes   Sit to Stand   Sit <> Stand Assistance Maximum Assistance   Sit <> Stand Assistive Device Rolling Walker   Trials/Comments from wc   Stand to Sit   Assistance Moderate Assistance   Assistive Device Rolling Walker   Chair to Bed   Technique Squat Pivot   Assistance Moderate Assistance   Assistive Device No Assistive Device   Wheelchair Activities   Propulsion Yes   Propulsion Type 1 Manual   Level 1 Level tile   Method 1 Right upper extremity;Left upper extremity   Level of Assistance 1 Stand by assistsance   Description/ Details 1 50' x 2   Gait   Gait Yes   Weight Bearing Status FWB   Gait 1   Surface 1 Level tile   Gait Assistive Device Rolling walker   Other Apparatus 1 Wheelchair follow   Assistance 1 Moderate assistance   Gait Distance 3', 5'   Gait Pattern swing-to gait   Gait Deviation(s) decreased davion;increased time in double stance;decreased velocity of limb motion;decreased step length;decreased stride length;increased stride width;decreased toe-to-floor clearance;forward lean   Impairments Contributing to Gait Deviations impaired balance;decreased flexibility;impaired motor control;pain;impaired postural control;decreased strength   Seated    Seated-Exercises Lower extremity;Specific exercises   Seated-Exercise Type Ankle pumps;Hip flexion;Long arc quads;ABduction;ADduction   Seated-Exercise Comments B LE x 20 reps   Activity Tolerance   Activity Tolerance Patient tolerated treatment well   Other Comments   Comments Pt returned supine after session per pt request   After Treatment   Patient Position After Treatment Supine in bed   Patient after treatment left call button in reach   Assessment   Prognosis Fair   Problem List Decreased strength;Decreased endurance;Impaired balance;Decreased mobility;Decreased coordination;Decreased cognition;Impaired judgement;Decreased safety awareness;Impaired vision;Pain   Assessment Pt ayde tx poorly due to req max encouragement to participate.  Pt max A for sit to stand and not willing to increase amb distance.  Cont POC   Level of Motivation/Participation poor   Barriers to Discharge Inaccessible home environment;Decreased caregiver support   Plan   Planned Therapy Intervention Continue with current plan   Therapy Frequency 2 times/day;Monday-Friday;Saturday or Sunday   Physical Therapy Follow-up   PT Follow-up? Yes   Treatment/Billable Minutes   Gait training 10   Therapeutic Activity 20   Therapeutic Exercise 20   Total Time 50       Sherri Sherman, PTA  2/20/2017

## 2017-02-20 NOTE — PROGRESS NOTES
Physical Therapy   Daily FIM Scores    Rah Puente   MRN: [id number]      02/20/17 1500   Transfers   Bed/Chair/WC 4   Locomotion   Distance Walked 1   Distance Wheelchair 1   Walk 1   Wheelchair 1   Mode C   Stairs 0       Sherri Sherman, PTA  2/20/2017

## 2017-02-20 NOTE — PROGRESS NOTES
"Occupational Therapy   PM TREATMENT    Rah Puente   MRN: [id number]            02/20/17 1300   OT Time Calculation   OT Start Time 1300   OT Stop Time 1345   OT Total Time (min) 45 min   General   OT Date of Treatment 02/20/17   Missed Treatment Reason Not applicable   Family/Caregiver Present No   Subjective   Patient states "I am so confused. What day is it? What time is it?"   Pain/Comfort   Pain Rating no pain   Toileting   Toileting Level of Assistance Maximum assistance   Toileting Where Assessed Wheelchair   Toileting Comments to use urinal   All Joints - ROM - Right   R Motion All Joints AROM;AAROM   R Position All Joints Seated   R Weight/Reps/Sets All Joints 1 set of 10 various planes   All Joints - ROM - Left   L Motion All Joints AROM;AAROM   L Position All Joints Seated   L Weight/Reps/Sets All Joints 1 set if 10 various planes   Exercise Tools   UE Ergometer 5 minutes   Discharge Recommendations   Equipment Needed After Discharge wheelchair   Discharge Facility/Level Of Care Needs home health OT   Plan   Plan Continue with current plan   Therapy Frequency 2 times/day;Monday-Friday;Saturday or Sunday   Occupational Therapy Follow-up   OT Follow-up? Yes   Treatment/Billable Minutes   Self Care/Home Management 15   Therapeutic Exercise 30   Total Time 45   VIVEK Ravi/L  2/20/2017    "

## 2017-02-20 NOTE — PROGRESS NOTES
Physical Therapy   Seated Endurance Group Treatment    Rah Puente   MRN: [id number]        02/20/17 1030   PT Time Calculation   PT Start Time 1030   PT Stop Time 1115   PT Total Time (min) 45 min   Treatment   Treatment Type Treatment  (Seated Endurance Group)   PT/PTA PT   General   PT Received On 02/20/17   Family/Caregiver Present No   Patient Found (position) Seated in wheelchair   Pt found with cervical collar   Precautions   General Precautions diabetic;fall   Required Braces or Orthoses Yes   Cervical Brace [place]   Visual/Auditory Vision impaired   Subjective   Patient states Pt agreeable to participate in group treatment session   Pain/Comfort   Pain Rating no pain   Pain Rating Post-Intervention no pain   Other Comments   Comments Patient participated in 45 minute seated high endurance group. The group activity challenged bilateral upper extremity and lower extremity strengthening. In addition, cardiovascular and functional endurance were challenged during this group. Patient was educated on appropriate weight shift techniques including: lateral weight shifts and wheelchair pushups    Patient completed 20 reps x 5 sets bicep curls, side raises, shoulder flexion, shoulder extension shoulder shrugs (forward and backward), and shoulder scrubs (upper/middle/lower) (in UE circuit)    Patient completed 20 reps x 5 sets hip flexion, knee flexion, knee extension, toe and heel taps, hip adduction (in LE circuit)    - Alternating punches, side punches, diagonal reaches x 20 x 2 reps    Pt required four rest breaks during therapeutic exercises. These activities were performed in a group setting to encourage participation with peers and social interaction skills. Patient required maximum encouragement from PT to participate in this activity      Treatment/Billable Minutes   Therapeutic Activity Group 45   Total Time 45     Pilar Liao, PT  2/20/2017

## 2017-02-20 NOTE — PROGRESS NOTES
Pt without new c/o.    Interval hx:  I have reviewed the pt's HPI and PFSH and it is unchanged from admission.     VSS - Temp:  [98.1 °F (36.7 °C)] 98.1 °F (36.7 °C)  Pulse:  [88-92] 92  Resp:  [17-18] 18  SpO2:  [97 %] 97 %  BP: (108-113)/(60-63) 113/60    Review of Systems   Eyes: Negative for blurred vision.   Respiratory: Negative for cough.   Cardiovascular: Negative for chest pain.   Gastrointestinal: Negative for nausea and vomiting.   Genitourinary: Negative for dysuria.   Musculoskeletal: Positive for back pain and neck pain. Negative for myalgias.   Skin: Negative.   Neurological: Positive for tingling and weakness. Negative for dizziness, sensory change and headaches.   Psychiatric/Behavioral: Negative for depression. The patient has insomnia.      Physical Exam   Constitutional: He is oriented to person, place, and time. He appears well-nourished.   Eyes: EOM are normal. Pupils are equal, round, and reactive to light.   Neck: Neck supple.   Cardiovascular: Normal rate.   Pulmonary/Chest: Effort normal.   Abdominal: Soft.   Musculoskeletal:   BUEs AROM WNL  LLE AROM WNL  RLE with diminished HF   Neurological: He is oriented to person, place, and time.   LUE 4-/5 except 3+/5 EE  RUE 4/5 except 4-/5 EE  LLE 4/5  RLE 3+/5 HF/HE, 4-/5 KF/KE/DF/PF   Skin: No rash noted.   Psychiatric: He has a normal mood and affect.     Assessment:    1. S/P C4-7 fusion with residual cervical myelopathy -- unchanged.   Sit-->stand MDA/MXA, T/Fs MXA, 13' MNA/MDA RW.  UBD MXA, LBD DEP, toileting DEP on 2/18.    2. DM2 -- monitor on dexamethasone.  Reduce prandial insulin from 6u to 4u.    3. HTN -- controlled  4. Neuropathic pain -- numbness/tingling of fingers -- on gabapentin  5. CKD -- acute exacerbation (CRT 1.9 on 2/20) -- encourage fluids.    6. Chronic hyponatremia -- stable (132 on 2/20).       Future Appointments  Date Time Provider Department Center   3/7/2017 11:00 AM Franklyn Ng MD [facility] SPINE [facility]    3/8/2017 10:30 AM LAB, HEMON CANCER [facility]   3/8/2017 11:30 AM Bill Goetz MD [facility] HEM ONC [facility]

## 2017-02-20 NOTE — PROGRESS NOTES
Recreational Therapy   Evaluation    Rah Puente  MRN: [id number]       02/20/17 0930   Rec Therapy Time Calculation   Rec Start Time 0930   Rec Stop Time 1000   Rec Total Time (min) 30 min   General   Admit Date 02/17/17   Alevism none   Number of Children 0   Occupation    Do you feel like you have enough to keep you busy now? Yes   Do you believe that you have the opportunity for physical activity? Yes   Activity Capabilities Moderate   Subjective   Patient states I thought I was done for today.   Precautions   General Precautions diabetic;fall   Cervical Brace [place]   Visual/Auditory Vision impaired  (Pt wears glasses)   Assessment   Mobility manual wheelchair   Musculoskeletal impaired fine motor coordination;impaired gross motor coordination;impaired balance;impaired strength;impaired endurance;impaired eye hand coordination   Speech/Communication   Pt able to express personal needs, however his lang sways from appropriate to off coloring(use of inappropriate lang).   Cognitive Concerns person;place;short term memory loss;long term memory loss;problem solving;concentration;attention span   Emotional Concerns appears anxious;excessive emotional response   Leisure Interest Survey   Leisure Interest Survey Yes   Social/Group Activities   Shopping Current Interest   Volunteering Current Interest   Parties/Seasonal Program Current Interest   Solitary Activities   Reading Current Interest   Physical Activities   Swimming Current Interest   Golf/Miniature Golf Current Interest   Creative Activities   Painting Current Interest   Photography Current Interest   Cooking Current Interest   Outdoor Activities   Bicycling Current Interest   Fishing Current Interest   Gardening Current Interest   Passive Games   Trivia Games Current Interest   Classic Board Games Current Interest   Social Board Games Current Interest   Therapeutic Recreation   Stress Management/Relaxation Training Able to identify stress of  pain levels   Social Skills Atypical behaviors & mannerisms are observed;Interacts independently in social activity   Leisure Education Demonstrates knowledge of benefits of leisure involvement   Leisure Resources Lacks awareness of leisure resources   Leisure Attitude/Participation With coaxing, participates in group activity and identifies benefits of involvement   Rec Therapy Group Assistance Moderate/max Assistance   Problem Solving Activity Assistance Moderate/max Assistance   Assessment   Assessment Pt presented with some states of confusion and word finding difficulties, pt apologized a lot during the session, at times he was able to recognized he was making errors.  Pt did not complaint of pain, but did state he was tired.   Pt stated that prior to his illness he and his partner were very active and that his leisure interests has declined.   Goals   Additional Documentation yes   Goal Formulation With patient   Time For Goal Achievement (12-14 days)   Goal 1 Pt will participate in various RT activities to: enhance endurance, improve fine motor/gross motor skills, enhance social opportunities while hospitalized.   Plan   Planned Therapy Intervention Plan of care initiated   PT Frequency Minimum of 1 visit per week   Time   Treatment time 2 units     Rehab orientation and unit safety rules reviewed with pt.  Therapist does not feel that pt clearly understand the information given to him.  It would be advised to frequently orient pt to his current environment and unit safety rules 2* decrease cognition.    Elvia Steen, CTRS,  2/20/2017

## 2017-02-20 NOTE — PROGRESS NOTES
"Occupational Therapy   Treatment    Rah Puente   MRN: [id number]          02/20/17 0730   OT Time Calculation   OT Start Time 0730   OT Stop Time 0815   OT Total Time (min) 45 min   General   OT Date of Treatment 02/20/17   Missed Treatment Reason Not applicable   Family/Caregiver Present No   Patient Found (position) Supine in bed;Other (comment)  (incontinent of urine; gown and sheets wet)   Precautions   General Precautions diabetic;fall   Required Braces or Orthoses Yes   Cervical Brace [place]   Visual/Auditory Vision impaired   Subjective   Patient states "I can't do this!"    Pain/Comfort   Location - Orientation generalized   Pain Comment Pt did not quantify just stating he was hurting all over   Bed Mobility   Rolling/Turning to Left Moderate assistance   Scooting/Bridging Minimum Assistance   Supine to Sit Moderate Assistance   Supine to Sit Comments HOB raised; help with upper body   Transfers   Transfer yes   Sit to Stand   Sit <> Stand Assistance Moderate Assistance   Sit <> Stand Assistive Device Rolling Walker   Trials/Comments several times from bed during self care   Stand to Sit   Assistance Moderate Assistance   Assistive Device Rolling Walker   Trials/Comments decrease control on descent   Bed to Chair   Bed <> Chair Technique Stand Pivot   Bed <> Chair Transfer Assistance Total Assistance   Bed <> Chair Assistive Device Rolling Walker   Trials/Comments Two people; pt's LE s buckling   Feeding   Feeding Level of Assistance Minimum assistance   Feeding Where Assessed Wheelchair   Feeding Comments set up and assist placing food on utensil   Grooming   Grooming Level of Assistance Maximum assistance   Grooming Where Assessed Wheelchair   Grooming Comments Pt unable to brush hair    Bathing   Bathing Level of Assistance Maximum assistance   Bathing Where Assessed Edge of bed   Bathing Comments pt kept saying he could not do it. assistance for thoroughness   UE Dressing   UE Dressing Level of " Assistance Maximum assistance   UE Dressing Where Assessed Edge of bed   UE Dressing Comments to thread arms and to button   LE Dressing   LE Dressing  Level of Assistance Total assistance   Toileting   Toileting Level of Assistance Total assistance   Toileting Comments Pt incontinent of urine upon arrival   Activity Tolerance   Activity Tolerance Treatment limited secondary to agitation   After Treatment   Patient Position After Treatment Seated in wheelchair   Patient after treatment left nursing notified  (in dining room; )   Assessment   Prognosis Fair   Problem List Decreased Self Care skills;Decreased upper extremity range of motion;Decreased upper extremity strength;Decreased safe judgment during ADL;Decreased cognition;Decreased endurance;Decreased fine motor control;Decreased functional mobility;Decreased gross motor control;Decreased IADLs;Decreased Function of right upper extremity;Decreased Function of left upper extremity;Decreased trunk control for functional activities   Assessment Pt confused today and refusing to answer questions or participate fully in therapy session.  Pt cursing throughout session. Pt will benefit from continued OT as he requires assistance with self care and functional mobility   Level of Motivation/Participation fair   Barriers to Discharge Home environment accessibility limitations   Discharge Recommendations   Equipment Needed After Discharge wheelchair   Discharge Facility/Level Of Care Needs home health OT   Plan   Plan Continue with current plan   Therapy Frequency 2 times/day;Monday-Friday;Saturday or Sunday   Occupational Therapy Follow-up   OT Follow-up? Yes   Treatment/Billable Minutes   Self Care/Home Management 45   Total Time 45     VIVEK Ravi/DYLON  2/20/2017

## 2017-02-20 NOTE — PROGRESS NOTES
Occupational Therapy   FIM SCORE    Rah Puente   MRN: [id number]            02/20/17 1400   Self Care   Toileting 2   Lulú Senior OTR/L  2/20/2017

## 2017-02-20 NOTE — PROGRESS NOTES
Occupational Therapy   FIM SCORE    Rah Puente   MRN: [id number]            02/20/17 1200   Transfers   Bed/Chair/WC 2   Self Care   Eating 4   Grooming 2   Bathing 2   Dressing-Upper 2   Dressing-Lower 1   Toileting 1   Lulú Senior OTR/L  2/20/2017

## 2017-02-20 NOTE — PROGRESS NOTES
Admit Assessment    Patient Identification  Rah Puente   :  1955  Admit Date:  2017  Attending Provider:  Brody Savage MD                EXPECTED DATE OF DISCHARGE:  To be determined at first treatment team staffing.     Pt was admitted to inpatient rehab.   is involved.    NAME OF PERSON PROVIDING INFORMATION FOR ASSESSMENT:  Patient    EMERGENCY CONTACT:  Primary Contact: Rich Herrmann                             Mobile Phone: [phone]                Relation: Significant Other    LIVING ARRANGEMENTS: [address] [place] [address].  Pt lives alone in a single story home.  Pt's significant other reported that he will be available to assist intermittently.      LEVEL OF INDEPENDENCE/ASSISTANCE REQUIRED PRIOR TO ADMISSION:   Independent using assistive device      HOME HEALTH OR OUTPATIENT THERAPY USAGE PRIOR TO ADMISSION:  None      DURABLE MEDICAL EQUIPMENT:  Rolling walker, tub bench and commode    Patient Preference of agencies include:  None stated  Patient/Caregiver informed of right to choose providers or agencies.  Patient provides permission to release any necessary information to [facility] and to Non-[facility] agencies as needed to facilitate patient care, treatment planning, and patient discharge planning.  Written and verbal resources provided.      Outpatient Pharmacy:     "[facility] [address] [place], [place] - [address]  [address] [facility] [address]  Phone: [phone] Fax: [phone]      FAMILY/CAREGIVER SUPPORT:  Pt's significant other will be primary caregiver.        LEVEL OF ORIENTATION:  AA&Ox3      ADJUSTMENT TO DIAGNOSIS AND TREATMENT:  Pt adjusting appropriately      EMOTIONAL/BEHAVIORAL STATUS/COGNITIVE ISSUES:  Pt presented with calm mood, good recall, and concentration.  Pt asked and answered questions appropriately.        History/Current Substance  Use:   Social History     Social History Main Topics    Smoking status: Former Smoker     Packs/day: 1.00     Types: Cigarettes     Quit date: 2014    Smokeless tobacco: Not on file    Alcohol use 2.4 oz/week     4 Shots of liquor per week      Comment: vodka-nightly    Drug use: No    Sexual activity: Not on file         Financial:  Payor/Plan Subscr  Sex Relation Sub. Ins. ID Effective Group Num   1. BLUE CROSS ALEA* SHAHANA TIERNEY D* 1955 Male  [id number]                                   [address] [address]   2. NOEMI HERNANDEZ ALEA* SHAHANA TIERNEY D* 1955 Male  [id number]                                    [address] [address]          DISCHARGE PLAN:  Home with home health      PATIENT PREFERENCE OF AGENCIES:  None stated      PATIENT/CAREGIVER CONCERNS EXPRESSED DURING ASSESSMENT:  None      INTERVENTIONS/REFERRALS:  Pt caregiver engaged in treatment planning process. SW will continue to follow for all resources, education, discharge planning and psychosocial needs.  Patient/caregiver engaged in treatment planning process.  SW available as needed.

## 2017-02-21 LAB
ANION GAP SERPL CALC-SCNC: 7 MMOL/L
BUN SERPL-MCNC: 53 MG/DL
CALCIUM SERPL-MCNC: 8.6 MG/DL
CHLORIDE SERPL-SCNC: 94 MMOL/L
CO2 SERPL-SCNC: 29 MMOL/L
CREAT SERPL-MCNC: 1.7 MG/DL
EST. GFR  (AFRICAN AMERICAN): 48.5 ML/MIN/1.73 M^2
EST. GFR  (NON AFRICAN AMERICAN): 42 ML/MIN/1.73 M^2
GLUCOSE SERPL-MCNC: 121 MG/DL
POCT GLUCOSE: 124 MG/DL (ref 70–110)
POCT GLUCOSE: 129 MG/DL (ref 70–110)
POCT GLUCOSE: 224 MG/DL (ref 70–110)
POCT GLUCOSE: 247 MG/DL (ref 70–110)
POTASSIUM SERPL-SCNC: 5.3 MMOL/L
SODIUM SERPL-SCNC: 130 MMOL/L

## 2017-02-21 PROCEDURE — 12800000 HC REHAB SEMI-PRIVATE ROOM

## 2017-02-21 PROCEDURE — 97110 THERAPEUTIC EXERCISES: CPT

## 2017-02-21 PROCEDURE — 97150 GROUP THERAPEUTIC PROCEDURES: CPT

## 2017-02-21 PROCEDURE — 97542 WHEELCHAIR MNGMENT TRAINING: CPT

## 2017-02-21 PROCEDURE — 36415 COLL VENOUS BLD VENIPUNCTURE: CPT

## 2017-02-21 PROCEDURE — 99233 SBSQ HOSP IP/OBS HIGH 50: CPT | Mod: ,,, | Performed by: PHYSICAL MEDICINE & REHABILITATION

## 2017-02-21 PROCEDURE — 25000003 PHARM REV CODE 250: Performed by: ORTHOPAEDIC SURGERY

## 2017-02-21 PROCEDURE — 25000003 PHARM REV CODE 250: Performed by: PHYSICAL MEDICINE & REHABILITATION

## 2017-02-21 PROCEDURE — 80048 BASIC METABOLIC PNL TOTAL CA: CPT

## 2017-02-21 PROCEDURE — 97116 GAIT TRAINING THERAPY: CPT

## 2017-02-21 RX ORDER — ZOLPIDEM TARTRATE 5 MG/1
5 TABLET ORAL NIGHTLY
Status: DISCONTINUED | OUTPATIENT
Start: 2017-02-21 | End: 2017-03-04 | Stop reason: HOSPADM

## 2017-02-21 RX ADMIN — INSULIN DETEMIR 18 UNITS: 100 INJECTION, SOLUTION SUBCUTANEOUS at 08:02

## 2017-02-21 RX ADMIN — AMLODIPINE BESYLATE 10 MG: 10 TABLET ORAL at 08:02

## 2017-02-21 RX ADMIN — STANDARDIZED SENNA CONCENTRATE AND DOCUSATE SODIUM 1 TABLET: 8.6; 5 TABLET, FILM COATED ORAL at 08:02

## 2017-02-21 RX ADMIN — INSULIN ASPART 4 UNITS: 100 INJECTION, SOLUTION INTRAVENOUS; SUBCUTANEOUS at 12:02

## 2017-02-21 RX ADMIN — HEPARIN SODIUM 5000 UNITS: 5000 INJECTION, SOLUTION INTRAVENOUS; SUBCUTANEOUS at 02:02

## 2017-02-21 RX ADMIN — INSULIN ASPART 2 UNITS: 100 INJECTION, SOLUTION INTRAVENOUS; SUBCUTANEOUS at 12:02

## 2017-02-21 RX ADMIN — GABAPENTIN 300 MG: 300 CAPSULE ORAL at 05:02

## 2017-02-21 RX ADMIN — CARVEDILOL 25 MG: 25 TABLET, FILM COATED ORAL at 08:02

## 2017-02-21 RX ADMIN — HEPARIN SODIUM 5000 UNITS: 5000 INJECTION, SOLUTION INTRAVENOUS; SUBCUTANEOUS at 05:02

## 2017-02-21 RX ADMIN — GABAPENTIN 300 MG: 300 CAPSULE ORAL at 09:02

## 2017-02-21 RX ADMIN — QUINAPRIL 20 MG: 20 TABLET ORAL at 08:02

## 2017-02-21 RX ADMIN — TAMSULOSIN HYDROCHLORIDE 0.4 MG: 0.4 CAPSULE ORAL at 08:02

## 2017-02-21 RX ADMIN — FAMOTIDINE 20 MG: 20 TABLET, FILM COATED ORAL at 07:02

## 2017-02-21 RX ADMIN — FAMOTIDINE 20 MG: 20 TABLET, FILM COATED ORAL at 08:02

## 2017-02-21 RX ADMIN — OXYCODONE HYDROCHLORIDE 15 MG: 5 TABLET ORAL at 02:02

## 2017-02-21 RX ADMIN — GABAPENTIN 300 MG: 300 CAPSULE ORAL at 02:02

## 2017-02-21 RX ADMIN — ZOLPIDEM TARTRATE 5 MG: 5 TABLET ORAL at 08:02

## 2017-02-21 RX ADMIN — CARVEDILOL 25 MG: 25 TABLET, FILM COATED ORAL at 07:02

## 2017-02-21 RX ADMIN — INSULIN ASPART 1 UNITS: 100 INJECTION, SOLUTION INTRAVENOUS; SUBCUTANEOUS at 09:02

## 2017-02-21 RX ADMIN — TAMSULOSIN HYDROCHLORIDE 0.4 MG: 0.4 CAPSULE ORAL at 07:02

## 2017-02-21 RX ADMIN — HEPARIN SODIUM 5000 UNITS: 5000 INJECTION, SOLUTION INTRAVENOUS; SUBCUTANEOUS at 09:02

## 2017-02-21 NOTE — PROGRESS NOTES
Physical Therapy   Daily FIM Scores    Rah Puente   MRN: [id number]      02/21/17 0900   Transfers   Bed/Chair/WC 3   Locomotion   Distance Walked 1   Distance Wheelchair 2   Walk 1   Wheelchair 2   Mode C   Stairs 0         Sherri Sherman, PTA  2/21/2017

## 2017-02-21 NOTE — PLAN OF CARE
Problem: Patient Care Overview  Goal: Plan of Care Review  Outcome: Ongoing (interventions implemented as appropriate)  POC reviewed with pt who verbalized understanding. Pt AAOX4.  Remains free of falls and injury. VSS and afebrile.   C/o slight pain controlled with PRN medication. Encouraged pt repositioned to help decrease pressure. Pt sleeping throughout the night. Infection control measures taken, such as hand washing and proper disposal of contaminated materials.Safety rounds maintained according to protocol, environmental consistency maintained. Rah Puente agrees to call when assistance is needed per call light which is within reach. No acute events. No distress noted. WCTM.         Problem: Diabetes, Type 2 (Adult)  Goal: Signs and Symptoms of Listed Potential Problems Will be Absent, Minimized or Managed (Diabetes, Type 2)  Signs and symptoms of listed potential problems will be absent, minimized or managed by discharge/transition of care (reference Diabetes, Type 2 (Adult) CPG).   Outcome: Ongoing (interventions implemented as appropriate)  PT'S DM IS MANAGED THROUGH DIET AND MEDICATION.  BLOOD GLUCOSE IS MONITORED AC/HS AND NOVOLOG IS ADMIN PRN PER MD'S SLIDING SCALE ORDERS.  WILL CONTINUE TO MONITOR.        Problem: Fall Risk (Adult)  Goal: Identify Related Risk Factors and Signs and Symptoms  Related risk factors and signs and symptoms are identified upon initiation of Human Response Clinical Practice Guideline (CPG)   Outcome: Ongoing (interventions implemented as appropriate)  Patient able to understand, repeat, and comply with safety interventions (such as nonskid socks, low bed, call light in reach, etc).  Patient taught signs and symptoms of orthostatic hypotension and fall preventive measures.  Reflective lighting from bathroom used to minimize disorientation during the night while awaking.  Personal items within reach on personal table.

## 2017-02-21 NOTE — PROGRESS NOTES
Occupational Therapy   FIM scores    Rah Puente  MRN: [id number]  Room/Bed: E257/E257 B       02/21/17 1600   Transfers   Bed/Chair/WC 2   Communication   Comprehension 3       ZORAIDA Velasquez  2/21/2017

## 2017-02-21 NOTE — PROGRESS NOTES
"Occupational Therapy   Treatment    Rah Puente  MRN: [id number]  Room/Bed: E257/E257 B       02/21/17 1358   OT Time Calculation   OT Start Time 1358   OT Stop Time 1444   OT Total Time (min) 46 min   General   OT Date of Treatment 02/21/17   Family/Caregiver Present No   Patient Found (position) Seated in wheelchair   Pt found with cervical collar   Precautions   General Precautions diabetic;fall   Cervical Brace [place]   Visual/Auditory Vision impaired   Subjective   Patient states "Who is going to bring me downstairs?"  "I've only been up about 10 minutes." Pt. with increased confusion this pm.     Pain/Comfort   Pain Rating 10/10   Pain Comment Pt. reports pain all over.   Bed Mobility   Sit to Supine Contact Guard Assistance   Sit to Stand   Sit <> Stand Assistance Maximum Assistance   Sit <> Stand Assistive Device No Assistive Device   Stand to Sit   Assistance Moderate Assistance   Assistive Device No Assistive Device   Bed to Chair   Bed <> Chair Technique Squat Pivot   Bed <> Chair Transfer Assistance Maximum Assistance   Exercise Tools   UE Ergometer 7 minutes with 2 rest breaks   Additional Activities   Additional Activities Comments PM:  Pt. found seated in w/c following PT session.  Pt. c/o pain all over.  Participated in BUE ther-ex with UBE in sitting x 7 minutes.  Pt. required 2 seated rest breaks.  A/AAROM to BUEs at all joints x 20 reps to increase BUE strength, ROM.  [place] on flat surface with 1 #- 10 x 7 reps.  Pt. reports pain with all movement.  Requested back to bed at end of session.  Pt. t/f back to bed with Max A.     Activity Tolerance   Activity Tolerance Patient limited by pain   After Treatment   Patient Position After Treatment Supine in bed   Patient after treatment left call button in reach;with bed alarm on   Assessment   Prognosis Poor   Problem List Decreased Self Care skills;Decreased upper extremity range of motion;Decreased upper extremity strength;Decreased safe " judgment during ADL;Decreased cognition;Decreased endurance;Decreased fine motor control;Decreased functional mobility;Decreased gross motor control;Decreased IADLs;Decreased Function of right upper extremity;Decreased Function of left upper extremity;Decreased trunk control for functional activities   Assessment Pt. complained of pain throughout OT session with all exercises, all movement.  Pt. expressed desire to go home.  Pt. appeared more confused today than on initial eval- nonsensical verbalizations.  Pt. will need 24 hour assist at d/c.    Level of Motivation/Participation Fair   Discharge Recommendations   Equipment Needed After Discharge wheelchair   Discharge Facility/Level Of Care Needs home health OT   Plan   Plan Continue with current plan   Therapy Frequency 2 times/day;Monday-Friday   Occupational Therapy Follow-up   OT Follow-up? Yes   Treatment/Billable Minutes   Therapeutic Exercise 46   Total Time 46       ZORAIDA Velasquez  2/21/2017

## 2017-02-21 NOTE — PROGRESS NOTES
"Occupational Therapy   FMS Group    Rah Puente  MRN: [id number]  Room/Bed: E257/E257        02/21/17 1115   OT Time Calculation   OT Start Time 1115   OT Stop Time 1200   OT Total Time (min) 45 min   General   OT Date of Treatment 02/21/17   Precautions   General Precautions diabetic;fall   Cervical Brace [place]   Visual/Auditory Vision impaired   Subjective   Patient states "I'm done after this, right?"   Pain/Comfort   Pain Rating no pain   OT Therapeutic Groups   OT Therapeutic Yes   Other Pt. Participated in 45 minute FM coordination Group. The activity was performed to increase UE strength, coordination, FM manipulation, eye-hand coordination. Pt. Performed FM coordination activity with doubloon bank, red putty and pegs, lacing activity. Functional use of B UE hands and coordination. Pt was able to complete tasks with (S). Pt demonstrated increased [place] with FM tasks. These activities were performed in a group setting to encourage increased participation with peers and social interaction skills.    Occupational Therapy Follow-up   OT Follow-up? Yes   Treatment/Billable Minutes   Therapeutic Group 45   Total Time 45       ZORAIDA Jaimes  2/21/2017    LEGEND:   CGA: Contact Guard Assist   EOB: Edge of Bed   HHA: Hand Held Assist   HOB: Head of Bed   (I): Independent-patient performs task in a timely manner   Max (A): Maximal Assist-patient performs 25-49% of task   Min (A): Minimal Assist- patient performs 75% or more of task   Mod (A): Moderate Assist- patient performs 50-74% of task   NA: Not applicable   NT: Not tested   OOB: Out of Bed   PTA: Prior to admit   QC: Quad Cane   RW: Rolling Walker   (S): Supervision- patient requires cues, coaxing, prompting   SBA: Stand By Assist   SC: Straight Cane   SW: Standard Walker   TBA: To be assessed   Total (A): Total Assist- patient performs less than 25% of task   WC: Wheelchair   WFL: Within Functional Limits   WNL: Within Normal Limits    "

## 2017-02-21 NOTE — PROGRESS NOTES
Pt without new c/o.    Interval hx:  I have reviewed the pt's HPI and PFSH and it is unchanged from admission.     VSS - Temp:  [98.3 °F (36.8 °C)-98.8 °F (37.1 °C)] 98.8 °F (37.1 °C)  Pulse:  [] 100  Resp:  [17-18] 18  SpO2:  [94 %] 94 %  BP: (136-169)/(66-78) 136/66    Review of Systems   Eyes: Negative for blurred vision.   Respiratory: Negative for cough.   Cardiovascular: Negative for chest pain.   Gastrointestinal: Negative for nausea and vomiting.   Genitourinary: Negative for dysuria.   Musculoskeletal: Positive for back pain and neck pain. Negative for myalgias.   Skin: Negative.   Neurological: Positive for tingling and weakness. Negative for dizziness, sensory change and headaches.   Psychiatric/Behavioral: Negative for depression. The patient has insomnia.      Physical Exam   Constitutional: He is oriented to person, place, and time. He appears well-nourished.   Eyes: EOM are normal. Pupils are equal, round, and reactive to light.   Neck: Neck supple.   Cardiovascular: Normal rate.   Pulmonary/Chest: Effort normal.   Abdominal: Soft.   Musculoskeletal:   BUEs AROM WNL  LLE AROM WNL  RLE with diminished HF   Neurological: He is oriented to person, place, and time.   LUE 4-/5 except 3+/5 EE  RUE 4/5 except 4-/5 EE  LLE 4/5  RLE 3+/5 HF/HE, 4-/5 KF/KE/DF/PF   Skin: No rash noted.   Psychiatric: He has a normal mood and affect.     Assessment:    1. S/P C4-7 fusion with residual cervical myelopathy -- unchanged.   Sit-->stand MDA/MXA, T/Fs MDA/MXA (improved), 13' MNA/MDA RW.  UBD MXA, LBD DEP, toileting DEP on 2/18.    -- mildly confused -- has been since admission.  Will d/c Oxycontin.  He is c/o insomnia -- Ambien.  2. DM2 -- monitor on dexamethasone.  Reduced basal insulin from 18u to 14u 2/21 and prandial insulin from 6u to 4u 2/20.    3. HTN -- controlled  4. Neuropathic pain -- numbness/tingling of fingers -- on gabapentin  5. CKD -- acute exacerbation (CRT 1.9 on 2/20) -- encourage fluids.     6. Chronic hyponatremia -- stable (132 on 2/20).       Future Appointments  Date Time Provider Department Center   3/7/2017 11:00 AM Franklyn Ng MD [facility] SPINE [address]   3/8/2017 10:30 AM LAB, HEMONC CANCER [facility] LAB [facility] [address]   3/8/2017 11:30 AM Bill Goetz MD [facility] HEM ONC [address]

## 2017-02-21 NOTE — PROGRESS NOTES
"Physical Therapy  AM Treatment    Rah Puente   MRN: [id number]   PTA visit #: 2     02/21/17 0900   PT Time Calculation   PT Start Time 0900   PT Stop Time 0930   PT Total Time (min) 30 min   Treatment   Treatment Type Treatment   PT/PTA PTA   PTA Visit Number 2   General   PT Received On 02/21/17   Family/Caregiver Present No   Patient Found (position) Seated in wheelchair   Pt found with cervical collar   Precautions   General Precautions diabetic;fall   Required Braces or Orthoses Yes   Cervical Brace [place]   Visual/Auditory Vision impaired   Subjective   Patient states Pt agreeable to tx but stating, " I have no f-------ing energy"   Pain/Comfort   Location - Side Left   Location - Orientation upper   Location shoulder   Pain Addressed Distraction   Transfers   Transfer yes   Sit to Stand   Sit <> Stand Assistance Maximum Assistance   Sit <> Stand Assistive Device (// bars)   Stand to Sit   Assistance Moderate Assistance   Assistive Device (// bars)   Wheelchair Activities   Propulsion Yes   Propulsion Type 1 Manual   Level 1 Level tile   Method 1 Right upper extremity;Left upper extremity   Level of Assistance 1 Stand by assistsance   Description/ Details 1 50' x 2   Gait   Gait Yes   Weight Bearing Status FWB   Gait 1   Surface 1 Level tile   Gait Assistive Device Parallel bars   Other Apparatus 1 Wheelchair follow   Assistance 1 Moderate assistance   Gait Distance length of bars x 1 trial with mod A for post lean   Gait Pattern swing-to gait   Gait Deviation(s) decreased davion;increased time in double stance;decreased velocity of limb motion;decreased step length;decreased stride length;increased stride width;decreased swing-to-stance ratio;decreased toe-to-floor clearance;decreased weight-shifting ability;backward lean   Impairments Contributing to Gait Deviations impaired balance;decreased flexibility;impaired motor control;pain;impaired postural control;decreased strength   Static Standing " Balance   Static Standing-Balance Support Right upper extremity supported;Left upper extremity supported   Static Standing-Level of Assistance Moderate assistance   Static Standing-Comment/# of Minutes 35 sec // bars   Activity Tolerance   Activity Tolerance Patient tolerated treatment well   After Treatment   Patient Position After Treatment Seated in wheelchair   Assessment   Prognosis Fair   Problem List Decreased strength;Decreased range of motion;Decreased endurance;Impaired balance;Decreased mobility;Decreased cognition;Impaired judgement;Decreased safety awareness;Impaired vision;Pain   Assessment Pt ayde tx fair but continues to req encouragement and multiple vc to stay on task.  Pt self limiting and with confusion noted.  Cont POC   Level of Motivation/Participation fair   Barriers to Discharge Inaccessible home environment;Decreased caregiver support   Discharge Recommendations   Equipment Needed After Discharge wheelchair   Discharge Facility/Level Of Care Needs home health PT   Plan   Planned Therapy Intervention Continue with current plan   Therapy Frequency 2 times/day;Monday-Friday;Saturday or Sunday   Physical Therapy Follow-up   PT Follow-up? Yes   Treatment/Billable Minutes   Gait training 15   Train/Wheelchair Management 15   Total Time 30       Sherri Sherman, JACINTO  2/21/2017

## 2017-02-21 NOTE — PROGRESS NOTES
Physical Therapy  PM Treatment    Rah Puente   MRN: [id number]      02/21/17 1300   PT Time Calculation   PT Start Time 1300   PT Stop Time 1400   PT Total Time (min) 60 min   Treatment   Treatment Type Treatment   PT/PTA PTA   PTA Visit Number 2   General   PT Received On 02/21/17   Family/Caregiver Present No   Patient Found (position) Seated in wheelchair   Pt found with cervical collar   Precautions   General Precautions diabetic;fall   Required Braces or Orthoses Yes   Cervical Brace [place]   Visual/Auditory Vision impaired   Subjective   Patient states Pt agreeable to tx   Pain/Comfort   Location - Side Left   Location - Orientation upper   Location shoulder   Pain Addressed Distraction   Bed Mobility   Bed Mobility yes   Rolling/Turning to Left Stand by assistance   Rolling/Turning Right Stand by assistance   Supine to Sit Minimum Assistance   Sit to Supine Minimum Assistance   Transfers   Transfer yes   Chair to Mat   Chair<> Mat Technique Squat Pivot   Chair<>Mat Assistance Moderate Assistance   Chair <> Mat Assistive Device No Assistive Device   Mat to Chair   Technique Squat Pivot   Assistance Moderate Assistance   Assistive Device No Assistive Device   Wheelchair Activities   Propulsion Yes   Propulsion Type 1 Manual   Level 1 Level tile   Method 1 Right upper extremity;Left upper extremity   Level of Assistance 1 Stand by assistsance   Description/ Details 1 50', 30', 20'   Gait   Gait No  (ref)   Static Sitting Balance   Static Sitting-Balance Support Right upper extremity supported;Left upper extremity supported;Feet supported   Static Sitting-Level of Assistance Stand by Assistance   Static Sitting-Comment/# of Minutes EOM   Supine   Supine-Exercises Lower extremity;Specific exercises   Supine-Exercise Type Ankle pumps;Glut sets;Short arc quads;ABD/ADD;Heel slides   Supine-Exercise Comments B LE x 20 reps    Seated   Seated-Exercises Lower extremity;Specific exercises   Seated-Exercise Type  Long arc quads;ABduction;ADduction   Seated-Exercise Comments B LE 2 x 15   Activity Tolerance   Activity Tolerance Patient tolerated treatment well   After Treatment   Patient Position After Treatment Seated in wheelchair   Assessment   Prognosis Fair   Assessment Pt ayde tx fairly well but continues with multiple complaints and needing max encouragement to participate.  Pt ref to  // bars or to amb in // bars.  Cont POC   Level of Motivation/Participation fair   Barriers to Discharge Inaccessible home environment;Decreased caregiver support   Discharge Recommendations   Equipment Needed After Discharge wheelchair   Discharge Facility/Level Of Care Needs home health PT   Plan   Planned Therapy Intervention Continue with current plan   Therapy Frequency 2 times/day;Monday-Friday;Saturday or Sunday   Physical Therapy Follow-up   PT Follow-up? Yes   Treatment/Billable Minutes   Therapeutic Exercise 45   Train/Wheelchair Management 15   Total Time 60         Sherri Sherman, JACINTO  2/21/2017

## 2017-02-22 ENCOUNTER — TELEPHONE (OUTPATIENT)
Dept: ORTHOPEDICS | Facility: CLINIC | Age: 62
End: 2017-02-22

## 2017-02-22 ENCOUNTER — TELEPHONE (OUTPATIENT)
Dept: HEMATOLOGY/ONCOLOGY | Facility: CLINIC | Age: 62
End: 2017-02-22

## 2017-02-22 LAB
BACTERIA SPEC ANAEROBE CULT: NORMAL
POCT GLUCOSE: 116 MG/DL (ref 70–110)
POCT GLUCOSE: 138 MG/DL (ref 70–110)
POCT GLUCOSE: 154 MG/DL (ref 70–110)
POCT GLUCOSE: 175 MG/DL (ref 70–110)

## 2017-02-22 PROCEDURE — 25000003 PHARM REV CODE 250: Performed by: ORTHOPAEDIC SURGERY

## 2017-02-22 PROCEDURE — 25000003 PHARM REV CODE 250: Performed by: PHYSICAL MEDICINE & REHABILITATION

## 2017-02-22 PROCEDURE — 99233 SBSQ HOSP IP/OBS HIGH 50: CPT | Mod: ,,, | Performed by: PHYSICAL MEDICINE & REHABILITATION

## 2017-02-22 PROCEDURE — 97110 THERAPEUTIC EXERCISES: CPT

## 2017-02-22 PROCEDURE — 97150 GROUP THERAPEUTIC PROCEDURES: CPT

## 2017-02-22 PROCEDURE — 92523 SPEECH SOUND LANG COMPREHEN: CPT

## 2017-02-22 PROCEDURE — 97530 THERAPEUTIC ACTIVITIES: CPT

## 2017-02-22 PROCEDURE — 97535 SELF CARE MNGMENT TRAINING: CPT

## 2017-02-22 PROCEDURE — 12800000 HC REHAB SEMI-PRIVATE ROOM

## 2017-02-22 RX ADMIN — GABAPENTIN 300 MG: 300 CAPSULE ORAL at 05:02

## 2017-02-22 RX ADMIN — QUINAPRIL 20 MG: 20 TABLET ORAL at 08:02

## 2017-02-22 RX ADMIN — FAMOTIDINE 20 MG: 20 TABLET, FILM COATED ORAL at 08:02

## 2017-02-22 RX ADMIN — TAMSULOSIN HYDROCHLORIDE 0.4 MG: 0.4 CAPSULE ORAL at 08:02

## 2017-02-22 RX ADMIN — CARVEDILOL 25 MG: 25 TABLET, FILM COATED ORAL at 08:02

## 2017-02-22 RX ADMIN — ZOLPIDEM TARTRATE 5 MG: 5 TABLET ORAL at 08:02

## 2017-02-22 RX ADMIN — GABAPENTIN 300 MG: 300 CAPSULE ORAL at 02:02

## 2017-02-22 RX ADMIN — HEPARIN SODIUM 5000 UNITS: 5000 INJECTION, SOLUTION INTRAVENOUS; SUBCUTANEOUS at 09:02

## 2017-02-22 RX ADMIN — HEPARIN SODIUM 5000 UNITS: 5000 INJECTION, SOLUTION INTRAVENOUS; SUBCUTANEOUS at 05:02

## 2017-02-22 RX ADMIN — AMLODIPINE BESYLATE 10 MG: 10 TABLET ORAL at 08:02

## 2017-02-22 RX ADMIN — INSULIN ASPART 4 UNITS: 100 INJECTION, SOLUTION INTRAVENOUS; SUBCUTANEOUS at 12:02

## 2017-02-22 RX ADMIN — OXYCODONE HYDROCHLORIDE 15 MG: 5 TABLET ORAL at 08:02

## 2017-02-22 RX ADMIN — OXYCODONE HYDROCHLORIDE 15 MG: 5 TABLET ORAL at 07:02

## 2017-02-22 RX ADMIN — GABAPENTIN 300 MG: 300 CAPSULE ORAL at 09:02

## 2017-02-22 RX ADMIN — HEPARIN SODIUM 5000 UNITS: 5000 INJECTION, SOLUTION INTRAVENOUS; SUBCUTANEOUS at 02:02

## 2017-02-22 NOTE — TELEPHONE ENCOUNTER
Returned call to patient's sister.   Shital (sister) is calling for results from bone scan and biopsy and looking for next steps---she asked to speak to Dr. Goetz.   I informed Shital I would fwd message to him and have him return her call.   Shital verbalized understanding.     Message routed to Dr. Goetz.       ----- Message from Ailyn Sanon sent at 2/22/2017 11:06 AM CST -----  Contact: self  Pt sister (Shital) is calling to see if  received pt results from bone scan and biopsy from .  Contact number [phone]

## 2017-02-22 NOTE — PROGRESS NOTES
"Physical Therapy   Treatment    Rah Puente   MRN: [id number]        02/22/17 0926   PT Time Calculation   PT Start Time 0926   PT Stop Time 1011   PT Total Time (min) 45 min   Treatment   Treatment Type Treatment   PT/PTA PT   General   PT Received On 02/22/17   Family/Caregiver Present No   Patient Found (position) Seated in wheelchair   Pt found with cervical collar  (posey belt donned in pt room)   Precautions   General Precautions diabetic;fall   Required Braces or Orthoses Yes   Cervical Brace [place]   Visual/Auditory Vision impaired   Subjective   Patient states "I have pain all the time. I need to go to the grocery"   Pain/Comfort   Pain Rating (Pt reported pain but unable to rate pain)   Transfers   Transfer yes   Sit to Stand   Sit <> Stand Assistance Activity did not occur   Sit <> Stand Assistive Device Other (see comments)  (parallel bars)   Trials/Comments PT attempted to perform this transfer x 3 trials in parallel bars, even with total assistance of 2 pt was unable to elevate hips out of wheelchair.    Wheelchair Activities   Propulsion Yes   Propulsion Type 1 Manual   Level 1 Level tile   Method 1 Right upper extremity;Left upper extremity   Level of Assistance 1 Minimum assistance   Description/ Details 1 Pt propelled self in wheelchair x 2 trials: 1st trial: 40 feet, 2nd trial:35 feet. Pt required maximum encouragement in order to fully participate in this activity. Pt required minimal assistance to complete wheelchair mobility negotiation around obstacles.    Gait   Gait No  (Pt refused to perform this activity )   Seated   Seated-Exercises Lower extremity;Specific exercises   Seated-Exercise Type Hip flexion;Long arc quads;Ankle pumps;ADduction  (hip ADD with blue theraball )   Seated-Exercise Comments Pt performed 3 x 15 reps to B LE, pt required maximum encouragement to fully participate in this activity    Other Activities   Comments PT attempted to have pt perform sit to stand and " ambulation trials. Pt constantly refused to participate in this activity even with maximum encouragement from PT. PT educated pt on importance of performing sit to stand transfer for pressure relief. Pt continued to refuse to perform this activity     PT educated on pressure relief techniques to perform in wheelchair including: lateral weight shifts and wheelchair pushups. PT educated pt to perform these pressure relief techniques every 20 minutes.    Activity Tolerance   Activity Tolerance Patient limited by pain;Patient limited by fatigue;Other (Comment)  (Patient refusal to participate)   After Treatment   Patient Position After Treatment Seated in wheelchair   Patient after treatment left (in gym, awaiting PT group)   Assessment   Prognosis Fair   Problem List Decreased strength;Decreased range of motion;Decreased endurance;Impaired balance;Decreased mobility;Decreased cognition;Impaired judgement;Decreased safety awareness;Impaired tone;Pain;Impaired sensation   Session Assessment other (see comments)  (slow progress)   Assessment Pt continues to require maximum physical assistance to complete all activities during PT treatment session. Pt refused initally to perform any standing and ambulation trials. Pt is greatly limited by pain and cognitive impairments. Pt requires significant assistance and supervision upon D/C at this time. PT is unsure if pt will make progress with PT during his stay on IP rehab due to these impairments.    Level of Motivation/Participation fair   Barriers to Discharge Inaccessible home environment;Decreased caregiver support   Barriers to Discharge Comments pt has 5 steps to enter, pt requires significant assistance and supervision at this time    Discharge Recommendations   Equipment Needed After Discharge wheelchair   Discharge Facility/Level Of Care Needs home health PT   Plan   Planned Therapy Intervention Continue with current plan;Bed mobility training;Transfer training;Balance  Training;ROM;Stretching;Strengthening;Neuromuscular Re-education;Postural Re-education;Wheelchair Management/Propulsion   Therapy Frequency 2 times/day;daily;Monday-Friday;Saturday or Sunday   Physical Therapy Follow-up   PT Follow-up? Yes   PT - Next Visit Date 02/23/17   Treatment/Billable Minutes   Therapeutic Activity 25   Therapeutic Exercise 20   Total Time 45       Pilar Liao, PT  2/22/2017

## 2017-02-22 NOTE — PROGRESS NOTES
Physical Therapy   Daily Physical Therapy FIM Scores    Rah Puente   MRN: [id number]        02/22/17 1000   Locomotion   Distance Wheelchair 1   Wheelchair 1   Mode C     Pilar Liao, PT  2/22/2017

## 2017-02-22 NOTE — TELEPHONE ENCOUNTER
----- Message from Stephan Wyatt sent at 2/22/2017  9:05 AM CST -----  X_  1st Request  _  2nd Request  _  3rd Request        Who: Shital, Patient's sister    Why: Patient awaiting results of biopsy following his surgery    What Number to Call Back: [phone]    When to Expect a call back: (Before the end of the day)   -- if the call is after 12:00, the call back will be tomorrow.

## 2017-02-22 NOTE — TELEPHONE ENCOUNTER
Returned call to Rivka Isaacs calling again regarding results.   I reiterated that Dr. Goetz out of office currently---patient stated she will contact Dr. Ng's office.       ----- Message from Ailyn Sanon sent at 2/22/2017  1:45 PM CST -----  Contact: pt sister (Shital)  Pt sister (Shital) is calling back to check status of message regarding results from biopsy.  Contact number [phone]

## 2017-02-22 NOTE — OP NOTE
DATE OF PROCEDURE:  02/13/2017.    PREOPERATIVE DIAGNOSES:  1.  Pathological burst fracture, C5.  2.  Severe cervical stenosis with myelopathy.  3.  Suspected metastatic colon adenocarcinoma.    POSTOPERATIVE DIAGNOSES:  1.  Pathological burst fracture, C5.  2.  Severe cervical stenosis with myelopathy.  3.  Suspected metastatic colon adenocarcinoma.    SURGERY PERFORMED:  1.  Corpectomies at C5 and C6.  2.  Application of titanium cage across C5 and C6 corpectomy defects.  3.  Anterior cervical decompression of thecal sac with resection of spinal tumor  4.  Application of anterior cervical plate, C4-C7.  5.  Anterior cervical spinal fusion, C4-C7.  6.  Cadaveric bone grafting.    PRIMARY SURGEON:  Franklyn Ng M.D.    CO-SURGEON:  Feng Casarez M.D.    ANESTHESIA:  GETA.    BLOOD LOSS:  100 mL.    COMPLICATIONS:  None.    SPECIMEN SENT:  Tumor from the C5 vertebral body for pathologic and   microbiologic analysis.    DRAINS:  One deep.    INDICATIONS FOR A DETAILED CLINICAL HISTORY:  Please see Dr. Ng's separate   operative dictation.  Briefly, this is a 61-year-old male with evidence of a C5   pathologic burst fracture with severe cervical stenosis and myelopathy.  The   fractures are in the setting of suspected metastatic adenocarcinoma of the   colon.    OPERATIVE NOTE:  For details about patient positioning, intubation, anesthesia   induction and localization, please see Dr. Ng's separate operative   dictation.  My involvement began at the time of the incision at the right neck.    A transverse linear incision was made with a #10 blade.  The platysma was   divided with Bovie electrocautery.  Subplatysmal dissection was carried out with   Bovie electrocautery.  A James Vazquez approach was taken to the anterior   cervical spine.  The carotid artery was palpated lateral to the working   corridor.  The anterior cervical spine was dissected with Kitner   dissectors.  A spinal needle was placed into  the presumed C6 vertebral body and   was confirmed on lateral x-ray.  Bovie electrocautery was used to expose the   vertebral bodies from C4-C7 and the intervening disc spaces.  A Trimline   retractor system was put into place.  The pathologic burst fracture was easily   identified under loupe magnification and a corpectomy was performed with a   combination of the drill and pituitary rongeurs.  Specimen from the C5 body that   was infiltrated with tumor was sent for pathologic analysis and was also   cultured.  The C4-C5 disk space was entered and disk material was removed with   pituitary rongeurs.  The C5 posterior wall was drilled down with an M8 leaving   the posterior longitudinal ligament behind it.  The microscope was then brought   into the field.  The posterior longitudinal ligament was removed with upgoing   curettes and Kerrison punches.  The endplate at C4 was prepared with a straight   curette.  The C6 vertebral body was then explored and found to be of poor bone   quality due to a known cyst within the body, presumed to be infiltrated with   tumor.  As such, the C6 vertebral body was also resected using the high-speed   drill and pituitary rongeurs.  In this case, since the decompression of thecal   sac was not required at this level, the posterior wall was left intact.  The   C6-C7 disk material was removed with pituitary rongeurs and the endplate was   prepared at C7 with a straight curette.  The corpectomy defect was then sized   and a titanium cage packed with cadaveric bone graft and was countersunk into the   corpectomy defect.  Lateral x-ray confirmed excellent position of the cage.  A   plate spanning from C4-C7 was temporarily pinned.  AP and lateral x-ray   confirmed excellent position of the hardware.  Screws were placed into the body   of C4 and C7 and finally tightened and locked.  AP and lateral x-ray confirmed   excellent position of all hardware.  Of note, greater than 75% of the  vertebral   bodies at C5 and C6 were ultimately removed with the corpectomy and fusion with   cadaveric bone grafting extended from C4-C7.  This concludes my involvement in   the case.  For details about closure, patient repositioning, extubation, and   emergence from anesthesia, please see Dr. Ng's separate operative   dictation.  During my involvement, the patient appeared to tolerate the   procedure well.  Motor evoked potentials were present and stable in all   extremities throughout the case.  At the end of my involvement, all counts were   correct.    JUSTIFICATION OF CO-SURGEON:  This was a complex pathologic fracture in a frail   patient.  Two attending surgeons were felt to be necessary to reduce operative   times, blood loss and to improve patient outcomes.      JUAN F  dd: 02/21/2017 16:47:50 (CST)  td: 02/21/2017 21:43:09 (CST)  Doc ID   #[id number]  Job ID #[id number]    CC:

## 2017-02-22 NOTE — PROGRESS NOTES
Pt without new c/o.    Interval hx:  I have reviewed the pt's HPI and PFSH and it is unchanged from admission.     VSS - Temp:  [98.2 °F (36.8 °C)-98.3 °F (36.8 °C)] 98.3 °F (36.8 °C)  Pulse:  [86-95] 86  Resp:  [17-18] 18  SpO2:  [95 %-96 %] 95 %  BP: (119-122)/(58-64) 122/64    Review of Systems   Eyes: Negative for blurred vision.   Respiratory: Negative for cough.   Cardiovascular: Negative for chest pain.   Gastrointestinal: Negative for nausea and vomiting.   Genitourinary: Negative for dysuria.   Musculoskeletal: Positive for back pain and neck pain. Negative for myalgias.   Skin: Negative.   Neurological: Positive for tingling and weakness. Negative for dizziness, sensory change and headaches.   Psychiatric/Behavioral: Negative for depression. The patient has insomnia.      Physical Exam   Constitutional: He is oriented to person, place, and time. He appears well-nourished.   Eyes: EOM are normal. Pupils are equal, round, and reactive to light.   Neck: Neck supple.   Cardiovascular: Normal rate.   Pulmonary/Chest: Effort normal.   Abdominal: Soft.   Musculoskeletal:   BUEs AROM WNL  LLE AROM WNL  RLE with diminished HF   Neurological: He is oriented to person, place, and time.   LUE 4-/5 except 3+/5 EE  RUE 4/5 except 4-/5 EE  LLE 4/5  RLE 3+/5 HF/HE, 4-/5 KF/KE/DF/PF   Skin: No rash noted.   Psychiatric: He has a normal mood and affect.     Assessment:    1. S/P C4-7 fusion with residual cervical myelopathy -- unchanged.   Sit-->stand MDA/MXA, T/Fs MDA/MXA (improved), 13' MNA/MDA RW.  UBD MXA, LBD DEP, toileting DEP on 2/18.  Refusing some therapy activities.    -- mildly confused -- has been since admission.  Oxycontin d/c'd with some improvement today.  Insomnia seems improved with Ambien.  2. DM2 -- monitor on dexamethasone.  Reduced basal insulin from 18u to 14u 2/21 and prandial insulin from 6u to 4u 2/20 with some improvement -- no low CBGs but now somewhat high (mid-200's) at times.  Cont to  monitor.  3. HTN -- controlled  4. Neuropathic pain -- numbness/tingling of fingers -- on gabapentin  5. CKD -- acute exacerbation (CRT 1.7 on 2/21) -- encourage fluids.    6. Chronic hyponatremia -- stable (130 on 2/21).       Future Appointments  Date Time Provider Department Center   3/7/2017 11:00 AM Franklyn Ng MD [facility] SPINE [address]   3/8/2017 10:30 AM LAB, HEMON CANCER BLDG [facility] LAB [facility] [address]   3/8/2017 11:30 AM Bill Goetz MD [facility] HEM ONC [address]

## 2017-02-22 NOTE — PLAN OF CARE
Problem: Patient Care Overview  Goal: Plan of Care Review  Outcome: Ongoing (interventions implemented as appropriate)  POC reviewed with pt who verbalized understanding. Pt AAOX4.  Remains free of falls and injury. VSS and afebrile.  Encouraged pt repositioned to help decrease pressure. Pt sleeping throughout the night.  Infection control measures taken, such as hand washing and proper disposal of contaminated materials.Safety rounds maintained according to protocol, environmental consistency maintained. Rah Puente agrees to call when assistance is needed per call light which is within reach. No acute events. No distress noted. WCTM.         Problem: Fall Risk (Adult)  Goal: Identify Related Risk Factors and Signs and Symptoms  Related risk factors and signs and symptoms are identified upon initiation of Human Response Clinical Practice Guideline (CPG)   Outcome: Ongoing (interventions implemented as appropriate)  Patient able to understand, repeat, and comply with safety interventions (such as nonskid socks, low bed, call light in reach, etc).  Patient taught signs and symptoms of orthostatic hypotension and fall preventive measures.  Reflective lighting from bathroom used to minimize disorientation during the night while awaking.  Personal items within reach on personal table.

## 2017-02-22 NOTE — PROGRESS NOTES
Occupational Therapy   FIM scores    Rah Puente  MRN: [id number]  Room/Bed: E257/E257 B       02/22/17 1600   Transfers   Bed/Chair/WC 2   Self Care   Dressing-Lower 1   Toileting 1       ZORAIDA Velasquez  2/22/2017    LEGEND:   CGA: Contact Guard Assist   EOB: Edge of Bed   HHA: Hand Held Assist   HOB: Head of Bed   (I): Independent-patient performs task in a timely manner   Max (A): Maximal Assist-patient performs 25-49% of task   Min (A): Minimal Assist- patient performs 75% or more of task   Mod (A): Moderate Assist- patient performs 50-74% of task   NA: Not applicable   NT: Not tested   OOB: Out of Bed   PTA: Prior to admit   QC: Quad Cane   RW: Rolling Walker   (S): Supervision- patient requires cues, coaxing, prompting   SBA: Stand By Assist   SC: Straight Cane   SW: Standard Walker   TBA: To be assessed   Total (A): Total Assist- patient performs less than 25% of task   WC: Wheelchair   WFL: Within Functional Limits   WNL: Within Normal Limits

## 2017-02-22 NOTE — PROGRESS NOTES
"Occupational Therapy   Treatment    Rah Puente  MRN: [id number]  Room/Bed: E257/E257 B       02/22/17 1435   OT Time Calculation   OT Start Time 1435   OT Stop Time 1525   OT Total Time (min) 50 min   General   OT Date of Treatment 02/22/17   Family/Caregiver Present No   Patient Found (position) Supine in bed   Pt found with cervical collar   Precautions   General Precautions diabetic;fall   Cervical Brace [place]   Visual/Auditory Vision impaired   Subjective   Patient states "Don't you have somewhere to be?  Meet the man on the patio?"    Pain/Comfort   Pain Rating 10/10   Pain Addressed Pre-medicate for activity;Distraction   Pain Comment Pt. c/o pain all over.     Bed Mobility   Supine to Sit Moderate Assistance  (assist with trunk elevation)   Sit to Stand   Sit <> Stand Assistance Maximum Assistance;Total Assistance   Sit <> Stand Assistive Device No Assistive Device   Stand to Sit   Assistance Maximum Assistance   Assistive Device No Assistive Device   Bed to Chair   Bed <> Chair Technique Squat Pivot   Bed <> Chair Transfer Assistance Maximum Assistance   Bed <> Chair Assistive Device No Assistive Device   Toilet Transfer   Toilet Transfer Technique Stand Pivot   Toilet Transfer Assistance Maximum Assistance   Toilet Transfer Assistive Device grab bar   Toileting   Toileting Level of Assistance Total assistance  (2 person assist to stand, perform kendrick care and clothing mgm)   Toileting Where Assessed Toilet   Toileting Comments Pt. urinated, had BM.  Nursing notified.     Additional Activities   Additional Activities Comments PM:  Pt. found supine in bed.  T/f to sitting with Mod A.  Squat pivot t/f to w/c with Max A- decreased safety.  Pt. requesting to use the urinal- Max A to manage clothing- pt. unable to urinate.  Grooming- Total A to comb hair; set-up assist for oral care.  Pt. transported to gym via w/c.  Yellow Theraputty with B hands to increase  strength.  Pt. again requested to urinate. "  Pt. returned to room.  T/f to toilet with Max A with use of grab bar.  Total A for clothing mgmt.  2 person assist after toileting to stand, perform kendrick care, manage clothing.  Pt. left in dining room.  Nursing notified.     Activity Tolerance   Activity Tolerance Patient limited by pain;Treatment limited secondary to medical complications (Comment)  (Confusion)   After Treatment   Patient Position After Treatment Seated in wheelchair  (in Dining Room, safety belt in place)   Patient after treatment left nursing notified   Assessment   Prognosis Poor   Problem List Decreased Self Care skills;Decreased upper extremity range of motion;Decreased upper extremity strength;Decreased safe judgment during ADL;Decreased cognition;Decreased endurance;Decreased fine motor control;Decreased functional mobility;Decreased gross motor control;Decreased IADLs;Decreased Function of right upper extremity;Decreased Function of left upper extremity;Decreased trunk control for functional activities   Assessment   Pt. Appears to be declining since initial eval.  Pt. Demonstrates increased difficulty with standing, maintaining stance during ADLs- 2 person assist for toileting, LB dressing.  Pt. Also is confused-saying random, nonsensical things during therapy session.  Pt. Remains limited by pain all over.   OT expects progress to be minimal 2* metastatic disease, difficulty participating in therapy.  Pt. Will need 24 hour assist at d/c.         Level of Motivation/Participation Fair/poor   Barriers to Discharge Home environment accessibility limitations;Decreased caregiver support   Discharge Recommendations   Equipment Needed After Discharge wheelchair   Discharge Facility/Level Of Care Needs home health OT   Plan   Plan Continue with current plan   Therapy Frequency 2 times/day;Monday-Friday   Occupational Therapy Follow-up   OT Follow-up? Yes   Treatment/Billable Minutes   Self Care/Home Management 35   Therapeutic Exercise 15    Total Time 50       ZORAIDA Velasquez  2/22/2017

## 2017-02-22 NOTE — PROGRESS NOTES
"Speech Therapy  Cognitive/Language Evaluation    Rah Puente   MRN: [id number]        02/22/17 1300   Speech Time Calculation   Speech Start Time 1300   Speech Stop Time 1345   Speech Total (min) 45 min   General Information   SLP Treatment Date 02/22/17   Referring Physician Dr Savage   General Observations Pt seen at bedside with noted HIGH agitation and frequent use of profanity.   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 2/14/2017    Type 2 diabetes mellitus with diabetic polyneuropathy, without long-term current use of insulin 5/22/2014      General Precautions diabetic;fall   Visual/Auditory Vision impaired  (glasses)   Subjective   Patient states Pt stated "I was raised in home cooking so it kills me."   Pain/Comfort   Pain Rating no pain   Assessment   SLP Diagnosis severe cognitive deficits; moderate language deficits   Prognosis Poor   Problem List severe confusion; agitation; poor awareness/insight; poor problem solving and safety awareness skills   Assessment Pt is a 60 y/o male admitted to IP rehab following C5-C6 corpectomy with C4-7 anterior cervical fusion on 2/13/17. Pt also with metastatic cancer to lungs and bone. Pt presents with severe confusion, decreased participation, and high agitation. Pt previously living alone and living independently. Due to agitation and frequent use of profanity, SLP unable to complete full assessment of " "cognition. Pt's confusion and poor orientation skills a good indicator of pt's cognitive skills at this time. Pt denies all deficits, including recent surgery despite wearing cervical collar. SLP attempted to engage pt in simple conversation requiring max cues with multiple repetitions of information and coaxing for participation. Pt stated "I was raised on home cooking so its killing me" when asked if he was in pain. Pt also stated "I think they are looking for me" when SLP asked "Do you need something to drink?" Complex YNQ task completed with 70% acc and following directions task completed with 50% acc. Responsive naming task completed with 50% acc as well. STM task completed with 0% acc indly, given min verbal cues, pt with increase to 66% acc. Immediate recall and LTM task completed with 50% acc. Pt's deficits of comprehension and expression 2' severe confusion with poor orientation. Pt unable to report age, unable to ID current place, disoriented to date/month/year, and unaware of Dx during evaluation.     Recommend trial ST services targeting orientation and social interaction skills; however, pt's progress is guarded at this time 2' poor social interaction skills, severe confusion, and progression of Dx.     Pt currently requires 24 hour supervision with severe confusion and poor safety awareness skills.   Level of Motivation/Participation poor   Discharge Recommendations   Discharge Facility/Level Of Care Needs home health speech therapy   Plan   Plan Plan of care intiated;Speech Language/Cognitive Therapy;Patient Education;Family Education   Therapy Frequency Monday-Friday   Plan of Care Expires on 03/22/17   SLP Follow-up   SLP Follow-up? Yes   Treatment/Billable Minutes   Eval 45   Total Time 45     Short Term Goals:  1. Pt will be oriented x2 given max cues with 30% acc.  2. Pt will complete attention tasks requiring redirection x3 within 5 minute task.  3. Pt will complete comprehension task given min " verbal cues with 75% acc.  4. Pt will complete expression task given min verbal cues with 75% acc.  5. Pt will engage in appropriate social interaction with staff given max cues with 25% acc.    Long Term Goals:   1. Pt will be oriented x4 given moderate cues with 60% acc.  2. Pt will complete attention tasks requiring redirection x1 within 5 minute task.  3. Pt will complete comprehension task given supervision with 90% acc.  4. Pt will complete expression task given min verbal cues with 75% acc.  5. Pt will engage in appropriate social interaction with staff given mod cues with 50% acc.    The patient's spiritual, cultural, social, and educational needs were considered with no evidence of barriers noted, and the patient is agreeable to plan of care.    FIM Scores  Comprehension:3  Expression: 3  Social Interaction:1  Problem Solvin  Memory: 1    Comprehension:    Complex= humor, finances, rationale for medical treatment(hip precautions, pressure relief)  Basic= pain, hunger, thirst, bathroom needs, cold, nutrition, sleep    Understands basic 50-74%- May need parts of sentences repeated (3)    Expression:  Expresses basic 50-74% of time - Needs to repeat parts of sentences  (3)    Social Interaction:  Interacts appropriately less than 25% of time - May be withdrawn or combative  (1)    Problem Solving:  Solves basic problems less than 25% of time - Needs direction nearly all the time or does not effectively solve problems and my need a restraint for safety. Bed alarm on at all times.  (1)    Memory:  Recognizes, recalls, or executes 1 step request less than 25% of time  (1)    Ciarra Oh CCC-SLP  2017

## 2017-02-22 NOTE — PROGRESS NOTES
"Physical Therapy  Seated Endurance Group Treatment    Rah Puente   MRN: [id number]        02/22/17 1015   PT Time Calculation   PT Start Time 1015   PT Stop Time 1100   PT Total Time (min) 45 min   Treatment   Treatment Type Treatment   PT/PTA PT   General   PT Received On 02/22/17   Missed Time Reason Not applicable   Family/Caregiver Present No   Patient Found (position) Seated in wheelchair   Pt found with cervical collar   Precautions   General Precautions diabetic;fall   Required Braces or Orthoses Yes   Cervical Brace [place]   Subjective   Patient states "I have to go to the grocery. I can't stay."   Pain/Comfort   Pain Rating no pain   After Treatment   Patient Position After Treatment Seated in wheelchair   Patient after treatment left call button in reach  (safety belt in place)   Plan   Planned Therapy Intervention Continue with current plan   Therapy Frequency 2 times/day;Monday-Friday;Saturday or Sunday   Physical Therapy Follow-up   PT Follow-up? Yes   PT - Next Visit Date 02/23/17   Treatment/Billable Minutes   Therapeutic Activity Group 45   Total Time 45     Patient participated in 45 minute seated high endurance group. The group activity challenged bilateral upper extremity and lower extremity strengthening. In addition, cardiovascular and functional endurance were challenged during this group.    Patient completed the following upper extremity exercises for 3 sets of 20 reps with a 3# dowel: bicep curls, shoulder flexion, chest press, T's, side punches with abdominal twist     Patient completed the following lower extremity exercises for 3 sets of 20 reps: hip flexion, knee extension, toe and heel taps (5x20), hip adduction with theraball, hip abduction with green theraband    Patient also performed wheelchair pushups (x15) and side leans x 20 reps after PT educated patients about proper pressure relief techniques.      Pt required 0 rest breaks during therapeutic exercises. These activities " were performed in a group setting to encourage participation with peers and social interaction skills.    Alisa Barillas DPT  2/22/2017

## 2017-02-23 ENCOUNTER — TELEPHONE (OUTPATIENT)
Dept: HEMATOLOGY/ONCOLOGY | Facility: CLINIC | Age: 62
End: 2017-02-23

## 2017-02-23 LAB
ANION GAP SERPL CALC-SCNC: 11 MMOL/L
BUN SERPL-MCNC: 39 MG/DL
CALCIUM SERPL-MCNC: 8.9 MG/DL
CHLORIDE SERPL-SCNC: 95 MMOL/L
CO2 SERPL-SCNC: 24 MMOL/L
CREAT SERPL-MCNC: 1.3 MG/DL
EST. GFR  (AFRICAN AMERICAN): >60 ML/MIN/1.73 M^2
EST. GFR  (NON AFRICAN AMERICAN): 58.1 ML/MIN/1.73 M^2
GLUCOSE SERPL-MCNC: 100 MG/DL
POCT GLUCOSE: 110 MG/DL (ref 70–110)
POCT GLUCOSE: 116 MG/DL (ref 70–110)
POCT GLUCOSE: 125 MG/DL (ref 70–110)
POCT GLUCOSE: 88 MG/DL (ref 70–110)
POTASSIUM SERPL-SCNC: 4.1 MMOL/L
SODIUM SERPL-SCNC: 130 MMOL/L

## 2017-02-23 PROCEDURE — 97530 THERAPEUTIC ACTIVITIES: CPT

## 2017-02-23 PROCEDURE — 25000003 PHARM REV CODE 250: Performed by: PHYSICAL MEDICINE & REHABILITATION

## 2017-02-23 PROCEDURE — 80048 BASIC METABOLIC PNL TOTAL CA: CPT

## 2017-02-23 PROCEDURE — 97110 THERAPEUTIC EXERCISES: CPT

## 2017-02-23 PROCEDURE — 25000003 PHARM REV CODE 250: Performed by: ORTHOPAEDIC SURGERY

## 2017-02-23 PROCEDURE — 99233 SBSQ HOSP IP/OBS HIGH 50: CPT | Mod: ,,, | Performed by: PHYSICAL MEDICINE & REHABILITATION

## 2017-02-23 PROCEDURE — 36415 COLL VENOUS BLD VENIPUNCTURE: CPT

## 2017-02-23 PROCEDURE — 12800000 HC REHAB SEMI-PRIVATE ROOM

## 2017-02-23 PROCEDURE — 92507 TX SP LANG VOICE COMM INDIV: CPT

## 2017-02-23 PROCEDURE — 97535 SELF CARE MNGMENT TRAINING: CPT

## 2017-02-23 RX ORDER — HYDROMORPHONE HYDROCHLORIDE 2 MG/1
6 TABLET ORAL EVERY 4 HOURS PRN
Status: DISCONTINUED | OUTPATIENT
Start: 2017-02-23 | End: 2017-03-04 | Stop reason: HOSPADM

## 2017-02-23 RX ADMIN — GABAPENTIN 300 MG: 300 CAPSULE ORAL at 09:02

## 2017-02-23 RX ADMIN — HEPARIN SODIUM 5000 UNITS: 5000 INJECTION, SOLUTION INTRAVENOUS; SUBCUTANEOUS at 05:02

## 2017-02-23 RX ADMIN — HEPARIN SODIUM 5000 UNITS: 5000 INJECTION, SOLUTION INTRAVENOUS; SUBCUTANEOUS at 09:02

## 2017-02-23 RX ADMIN — CARVEDILOL 25 MG: 25 TABLET, FILM COATED ORAL at 09:02

## 2017-02-23 RX ADMIN — HEPARIN SODIUM 5000 UNITS: 5000 INJECTION, SOLUTION INTRAVENOUS; SUBCUTANEOUS at 01:02

## 2017-02-23 RX ADMIN — TAMSULOSIN HYDROCHLORIDE 0.4 MG: 0.4 CAPSULE ORAL at 09:02

## 2017-02-23 RX ADMIN — ZOLPIDEM TARTRATE 5 MG: 5 TABLET ORAL at 09:02

## 2017-02-23 RX ADMIN — INSULIN ASPART 4 UNITS: 100 INJECTION, SOLUTION INTRAVENOUS; SUBCUTANEOUS at 09:02

## 2017-02-23 RX ADMIN — FAMOTIDINE 20 MG: 20 TABLET, FILM COATED ORAL at 09:02

## 2017-02-23 RX ADMIN — GABAPENTIN 300 MG: 300 CAPSULE ORAL at 05:02

## 2017-02-23 RX ADMIN — INSULIN ASPART 4 UNITS: 100 INJECTION, SOLUTION INTRAVENOUS; SUBCUTANEOUS at 01:02

## 2017-02-23 RX ADMIN — GABAPENTIN 300 MG: 300 CAPSULE ORAL at 01:02

## 2017-02-23 RX ADMIN — OXYCODONE HYDROCHLORIDE 15 MG: 5 TABLET ORAL at 10:02

## 2017-02-23 RX ADMIN — AMLODIPINE BESYLATE 10 MG: 10 TABLET ORAL at 09:02

## 2017-02-23 RX ADMIN — QUINAPRIL 20 MG: 20 TABLET ORAL at 09:02

## 2017-02-23 NOTE — TELEPHONE ENCOUNTER
----- Message from Veronica Keith NP sent at 2/23/2017  2:03 PM CST -----  Contact: pt sister Shital  Let her know I left her a voicemail explaining to her that Dr. Goetz is out of the office until 3/1. Additionally, informed that the bone biopsy was inconclusive and will finalize plan of care when the patient comes on 3/8 to see Dr. Goetz.    Thanks!  ----- Message -----     From: Sailaja Perdomo     Sent: 2/23/2017   1:53 PM       To: Veronica Keith NP    Pt sister Shital states she is returning your phone call in  Regards to her brother results, pt sister did not give any further info.      Pt sister Shital can be reached at [phone].

## 2017-02-23 NOTE — PLAN OF CARE
Problem: Patient Care Overview  Goal: Plan of Care Review  Outcome: Ongoing (interventions implemented as appropriate)  Plan of care reviewed.  Problem: Diabetes, Type 2 (Adult)  Intervention: Support/Optimize Psychosocial Response to Condition  Is compliant with treatment. Needs encouragment to eat.  Problem: Fall Risk (Adult)  Intervention: Monitor/Assist with Self Care  Reminded to call for assistance when needed. Bed alarm remains activated.

## 2017-02-23 NOTE — PROGRESS NOTES
Pt without new c/o.    Interval hx:  I have reviewed the pt's HPI and PFSH and it is unchanged from admission.     VSS - Temp:  [98.1 °F (36.7 °C)-98.3 °F (36.8 °C)] 98.3 °F (36.8 °C)  Pulse:  [94-95] 94  Resp:  [16-17] 16  SpO2:  [96 %-98 %] 98 %  BP: (164-166)/(82-86) 164/86    Review of Systems   Eyes: Negative for blurred vision.   Respiratory: Negative for cough.   Cardiovascular: Negative for chest pain.   Gastrointestinal: Negative for nausea and vomiting.   Genitourinary: Negative for dysuria.   Musculoskeletal: Positive for back pain and neck pain. Negative for myalgias.   Skin: Negative.   Neurological: Positive for tingling and weakness. Negative for dizziness, sensory change and headaches.   Psychiatric/Behavioral: Negative for depression. The patient has insomnia.      Physical Exam   Constitutional: He is oriented to person, place, and time. He appears well-nourished.   Eyes: EOM are normal. Pupils are equal, round, and reactive to light.   Neck: Neck supple.   Cardiovascular: Normal rate.   Pulmonary/Chest: Effort normal.   Abdominal: Soft.   Musculoskeletal:   BUEs AROM WNL  LLE AROM WNL  RLE with diminished HF   Neurological: He is oriented to person, place, and time.   LUE 4-/5 except 3+/5 EE  RUE 4/5 except 4-/5 EE  LLE 4/5  RLE 3+/5 HF/HE, 4-/5 KF/KE/DF/PF   Skin: No rash noted.   Psychiatric: He has a normal mood and affect.     Assessment:    1. S/P C4-7 fusion with residual cervical myelopathy -- unchanged.   Sit-->stand MDA/MXA, T/Fs MDA/MXA (improved), 13' MNA/MDA RW.  UBD MXA, LBD DEP, toileting DEP on 2/18.  Refusing some therapy activities.    -- moderately confused -- has been since admission.  Oxycontin d/c'd with some improvement but confusion waxes/wanes.  This appears to be delirium.  MRI of brain did not show parenchymal mets.  I will change oxycodone to hydromorphone for pain which is signficant -- he c/o pain in all therapy sessions.   Insomnia seems improved with Ambien.  2. DM2  -- monitor on dexamethasone.  Reduced basal insulin from 18u to 14u 2/21 and prandial insulin from 6u to 4u 2/20 with some improvement -- no low CBGs.  Cont to monitor.  Will also ask for supervised meals as pt is eating very little.    3. HTN -- controlled  4. Neuropathic pain -- numbness/tingling of fingers -- on gabapentin  5. CKD -- acute exacerbation now improved (CRT 1.9 on 2/20, 1.3 on 2/23) -- encouraging fluids.    6. Chronic hyponatremia -- stable (130 on 2/23).       Future Appointments  Date Time Provider Department Center   3/7/2017 11:00 AM Franklyn Ng MD [facility] SPINE [address]   3/8/2017 10:30 AM LAB, HEMON CANCER BLDG [facility] LAB HO [facility] Am[facility]   3/8/2017 11:30 AM Bill Goetz MD [facility] HEM ONC [facility]

## 2017-02-23 NOTE — PROGRESS NOTES
"Speech Therapy   Treatment    Rah Puente   MRN: [id number]        02/23/17 1445   Speech Time Calculation   Speech Start Time 1445   Speech Stop Time 1530   Speech Total (min) 45 min   General Information   SLP Treatment Date 02/23/17   General Observations Pt seen in ST office - alert. Pt noted with decreased particpation, attn across session. Increased confusion noted t/o session. Pt inappropriate in conversation x1. Pt frequently 'rolled eyes' during session - decreased motivation to participate.    General Precautions diabetic;fall   Visual/Auditory Vision impaired   Subjective   Patient states When asked about admit, pt stated he, "broke his hip".        SLP Cognitive Treatment   Treatment Detail (SLP Cognitive Treatment) Pt oriented to person independently, difficulty orienting to time despite given max verbal cues. Card sorting task attempted to improve attn - unable to complete 2' inattention, decreased motivation/participation. Pt noted with increased confusion during conversation.        SLP Treatment: Verbal Expression   Treatment Detail (SLP Verbal Expression) Pt completed one word phrase completion task with 90% accy independently, 100% with semantic cues to assist with word finding. To increase difficulty, pt completed one word sentence completion task with 40% accy independently, 70% with semantic cues/binary choice - cues needed to assist with word finding. Impulsivity noted during task (i.e. pulling on safety belt to stand up).        SLP Treatment: Auditory Comprehension   Treatment Detail (SLP Auditory Comprehension) Pt completed personal YNQ task with 70% accy independently.    Assessment   SLP Diagnosis severe cog-ling deficits   Prognosis Poor   Level of Motivation/Participation fair   Discharge Recommendations   Discharge Facility/Level Of Care Needs home with home health   Plan   Plan Continue with current plan   Therapy Frequency Monday-Friday   SLP Follow-up   SLP Follow-up? Yes "   Treatment/Billable Minutes   Speech Therapy Individual 45   Total Time 45       FIM Scores  Comprehension:3  Expression: 3  Social Interaction:1  Problem Solvin  Memory: 1     Comprehension:   Complex= humor, finances, rationale for medical treatment(hip precautions, pressure relief)  Basic= pain, hunger, thirst, bathroom needs, cold, nutrition, sleep     Understands basic 50-74%- May need parts of sentences repeated (3)     Expression:  Expresses basic 50-74% of time - Needs to repeat parts of sentences (3)     Social Interaction:  Interacts appropriately less than 25% of time - May be withdrawn or combative (1)     Problem Solving:  Solves basic problems less than 25% of time - Needs direction nearly all the time or does not effectively solve problems and my need a restraint for safety. Bed alarm on at all times. (1)     Memory:  Recognizes, recalls, or executes 1 step request less than 25% of time (1)     Sulma Hernadez CF-SLP  2017

## 2017-02-23 NOTE — PROGRESS NOTES
Occupational Therapy  Tx FIM  Rah Puente   MRN: [id number]        02/23/17 1600   Transfers   Bed/Chair/WC 2   Communication   Comprehension 3   Mode B   Expression 3   Mode B   Social Cognition   Social Interaction 5   Problem Solving 2   Memory 1       Uri Herrera IRENE 2/23/2017

## 2017-02-23 NOTE — PROGRESS NOTES
Physical Therapy   FIM    Rah Puente   MRN: [id number]   PTA visit       02/23/17 1200   Transfers   Bed/Chair/WC 2   Locomotion   Distance Walked 0   Distance Wheelchair 1   Wheelchair 1   Mode C   Stairs 0       Scout Pack, PTA  2/23/2017

## 2017-02-23 NOTE — PROGRESS NOTES
"Occupational Therapy   AM Note    Rah Puente  MRN: [id number]  Room/Bed: E257/E257 B       02/23/17 1015   OT Time Calculation   OT Start Time 1015   OT Stop Time 1030   OT Total Time (min) 15 min   General   OT Date of Treatment 02/23/17   Patient Found (position) Seated in wheelchair   Pt found with cervical collar   Precautions   General Precautions diabetic;fall   Cervical Brace [place]   Visual/Auditory Vision impaired   Subjective   Patient states "I know everyone is mad at me but I just can't do it!"   Pain/Comfort   Pain Rating 10/10   Location - Orientation generalized   Pain Addressed Pre-medicate for activity   Pain Comment RN notified and brought pt more pain meds   OT Therapeutic Groups   Other Pt attempted to participate in therex group but became agitated after 15min with one on one encouragement to perform various exercises from wc level. Pt required removal from gym 2/2 increased agitation and disrupting others participating in individualized treatments.   Activity Tolerance   Activity Tolerance Patient limited by pain   After Treatment   Patient Position After Treatment Seated in wheelchair   Assessment   Prognosis Poor   Problem List Decreased Self Care skills;Decreased upper extremity range of motion;Decreased upper extremity strength;Decreased safe judgment during ADL;Decreased cognition;Decreased endurance;Decreased fine motor control;Decreased functional mobility;Decreased gross motor control;Decreased IADLs;Decreased Function of right upper extremity;Decreased Function of left upper extremity;Decreased trunk control for functional activities   Assessment Pt attempted to participate in tx session but became aggitated to the point of needing to be removed from the gym. Pt left sitting quietly in his room and RN notified. RN was bringing pain meds to pt room following abbreviated session.   Level of Motivation/Participation Poor   Discharge Recommendations   Equipment Needed After " Discharge wheelchair   Discharge Facility/Level Of Care Needs home with home health   Plan   Plan Continue with current plan   Therapy Frequency 2 times/day   Occupational Therapy Follow-up   OT Follow-up? Yes   Treatment/Billable Minutes   Therapeutic Exercise 15   Total Time 15       ZORAIDA Jaimes  2/23/2017    LEGEND:   CGA: Contact Guard Assist   EOB: Edge of Bed   HHA: Hand Held Assist   HOB: Head of Bed   (I): Independent-patient performs task in a timely manner   Max (A): Maximal Assist-patient performs 25-49% of task   Min (A): Minimal Assist- patient performs 75% or more of task   Mod (A): Moderate Assist- patient performs 50-74% of task   NA: Not applicable   NT: Not tested   OOB: Out of Bed   PTA: Prior to admit   QC: Quad Cane   RW: Rolling Walker   (S): Supervision- patient requires cues, coaxing, prompting   SBA: Stand By Assist   SC: Straight Cane   SW: Standard Walker   TBA: To be assessed   Total (A): Total Assist- patient performs less than 25% of task   WC: Wheelchair   WFL: Within Functional Limits   WNL: Within Normal Limits

## 2017-02-23 NOTE — PROGRESS NOTES
"Occupational Therapy  PM Treatment Session  Rah Puente   MRN: [id number]        02/23/17 1300   OT Time Calculation   OT Start Time 1300   OT Stop Time 1430   OT Total Time (min) 90 min   General   OT Date of Treatment 02/23/17   Family/Caregiver Present No   Patient Found (position) Seated in wheelchair   Pt found with cervical collar   Precautions   General Precautions diabetic;fall   Required Braces or Orthoses Yes   Cervical Brace [place]   Visual/Auditory Vision impaired   Subjective   Patient states "just let me lie down"   Pain/Comfort   Pain Rating 10/10   Location arm   Pain Addressed Distraction;Nurse notified   Bed Mobility   Rolling/Turning to Left Moderate assistance   Rolling/Turning Left Comments @ Mat   Rolling/Turning Right Moderate assistance   Rolling/Turning Right Comments @ Mat   Supine to Sit Moderate Assistance   Supine to Sit Comments for trunk elevation   Sit to Supine Moderate Assistance   Sit to Supine Comments @ Mat    Sit to Stand   Sit <> Stand Assistance Maximum Assistance   Sit <> Stand Assistive Device No Assistive Device   Trials/Comments multiple trials from WC   Stand to Sit   Assistance Maximum Assistance   Assistive Device No Assistive Device   Trials/Comments to control descent   Chair to Mat   Chair <>Mat Technique Squat Pivot   Chair <> Mat Assistance Maximum Assistance   Chair<> Mat Assistive Device No Assistive Device   Trials/Comments WC <> EOM  (cues for hand placement)   Toilet Transfer   Toilet Transfer Technique Stand Pivot   Toilet Transfer Assistance Maximum Assistance   Toilet Transfer Assistive Device grab bar   Trials/Comments x2 trials ; wc <> BSC  ((A) for elevation/descent)   Grooming   Additional Grooming yes   Hand Washing Level of Assistance Minimum assistance   Grooming Where Assessed Sitting sinkside   Grooming Comments x3 trials after toileting   Toileting   Toileting Level of Assistance Total assistance   Toileting Where Assessed Toilet   Toileting " Comments x2 trials @ Toilet; x1 trial using urinal   Exercise Tools   Exercise Tools Yes   UE Ergometer 8min Forward with MIN resistance for endurance   Additional Activities   Additional Activities Other (Comment)   Additional Activities Comments Pt seated @ wc level for using BITS: visual scanning ~2-3min only; Pt with c/o pain and urge to toilet; Pt supine for using BUE AROM 1# dowel 3/15x reps bicep curls, 2/15x wrist flexion; Pt MOD A for bed mobility @ Mat; Pt seated @ EOM (MAX A ) for reaching for objects crossing midline to promote trunk control (Pt returned supine 2/2 c/o pain). Pt (MAX A) for wc management and propulsion.   Activity Tolerance   Activity Tolerance Patient tolerated treatment well   After Treatment   Patient Position After Treatment Seated in wheelchair   Patient after treatment left nursing notified   Assessment   Prognosis Poor   Problem List Decreased Self Care skills;Decreased upper extremity range of motion;Decreased upper extremity strength;Decreased safe judgment during ADL;Decreased cognition;Decreased endurance;Decreased fine motor control;Decreased functional mobility;Decreased gross motor control;Decreased IADLs;Decreased Function of right upper extremity;Decreased Function of left upper extremity;Decreased trunk control for functional activities   Assessment Pt limited by decreased cognition with disorganized thoughts and c/o pain throughout session requiring MAX redirection and rest breaks/increased time throughout. Pt would continue to benefit from OT services to increase functional mobility.   Level of Motivation/Participation Poor   Barriers to Discharge Home environment accessibility limitations   Discharge Recommendations   Equipment Needed After Discharge wheelchair   Discharge Facility/Level Of Care Needs home with home health   Plan   Plan Continue with current plan   Therapy Frequency 2 times/day;Monday-Friday;Saturday or Sunday   Treatment/Billable Minutes   Self  Care/Home Management 45   Therapeutic Activity 25   Therapeutic Exercise 20   Total Time 90       The [facility] and [facility] have collaborated and discussed the patient's status, treatment plan and progress toward established goals.     IRENE Theodore 2/23/2017

## 2017-02-23 NOTE — PROGRESS NOTES
"Physical Therapy   Treatment    Rah Puente   MRN: [id number]   PTA visit        02/23/17 1115   PT Time Calculation   PT Start Time 1115   PT Stop Time 1200   PT Total Time (min) 45 min   Treatment   Treatment Type Treatment   PT/PTA PTA   PTA Visit Number 1   General   PT Received On 02/23/17   Patient Found (position) Seated in wheelchair   Pt found with cervical collar   Precautions   General Precautions diabetic;fall   Required Braces or Orthoses Yes   Cervical Brace [place]   Visual/Auditory Vision impaired   Subjective   Patient states "When do we get off the plane?"   Pain/Comfort   Pain Rating no pain   Pain Rating Post-Intervention no pain  (Pt did not report pain during therapy session.)   Bed Mobility   Bed Mobility no   Transfers   Transfer yes   Sit to Stand   Sit <> Stand Assistance Maximum Assistance   Sit <> Stand Assistive Device (// bars)   Trials/Comments one trial    Stand to Sit   Assistance Moderate Assistance   Assistive Device (// bars)   Trials/Comments cues for safety   Wheelchair Activities   Propulsion Yes   Propulsion Type 1 Manual   Level 1 Level tile   Method 1 Right upper extremity;Left upper extremity  (pt required verb/tactile cues for hand placement)   Level of Assistance 1 Minimum assistance;Moderate assistance   Description/ Details 1 10 feet, max encouragement to initiate, participate in activity   Gait   Gait No   Balance   Balance Yes   Static Standing Balance   Static Standing-Balance Support Right upper extremity supported;Left upper extremity supported   Static Standing-Level of Assistance Moderate assistance   Static Standing-Comment/# of Minutes 30 seconds in // bars   Seated   Seated-Exercises Lower extremity   Seated-Exercise Type Ankle pumps;Hip flexion;Long arc quads;ABduction   Seated-Exercise Comments 3 x 10 reps   Other Activities   Comments Pt required toileting prior to coming to gym; pt is total assist for LE clothing management and required extended time " to void.   Activity Tolerance   Activity Tolerance Patient limited by fatigue;Treatment limited secondary to agitation   After Treatment   Patient Position After Treatment Seated in wheelchair   Patient after treatment left call button in reach;nursing notified  (PTA and PCT set pt up for lunch in pt's room)   Assessment   Prognosis Fair   Problem List Decreased strength;Decreased range of motion;Decreased endurance;Impaired balance;Decreased mobility;Decreased cognition;Impaired judgement;Decreased safety awareness;Impaired vision   Session Assessment (pt easily agitated and limited by decreased cognition)   Assessment Pt requires max encouragement to participate in therapy and has a limited attention span for activities.   Level of Motivation/Participation fair   Plan   Planned Therapy Intervention Continue with current plan   Therapy Frequency 2 times/day;Monday-Friday;Saturday or Sunday   Treatment/Billable Minutes   Therapeutic Activity 30   Therapeutic Exercise 15   Total Time 45         Scout Pack PTA  2/23/2017

## 2017-02-23 NOTE — TELEPHONE ENCOUNTER
Returned phone call to patient's sister, Shital. No answer. Voicemail left explaining to her that Dr. Goetz is out of the office until 3/1. Additionally, informed that the bone biopsy was inconclusive and will finalize plan of care when the patient comes on 3/8 to see Dr. Goetz.

## 2017-02-24 LAB
BASOPHILS # BLD AUTO: 0 K/UL
BASOPHILS NFR BLD: 0 %
DIFFERENTIAL METHOD: ABNORMAL
EOSINOPHIL # BLD AUTO: 0 K/UL
EOSINOPHIL NFR BLD: 0.4 %
ERYTHROCYTE [DISTWIDTH] IN BLOOD BY AUTOMATED COUNT: 13.9 %
HCT VFR BLD AUTO: 29.6 %
HGB BLD-MCNC: 9.4 G/DL
LYMPHOCYTES # BLD AUTO: 0.4 K/UL
LYMPHOCYTES NFR BLD: 4.3 %
MCH RBC QN AUTO: 28.2 PG
MCHC RBC AUTO-ENTMCNC: 31.8 %
MCV RBC AUTO: 89 FL
MONOCYTES # BLD AUTO: 0.5 K/UL
MONOCYTES NFR BLD: 5.3 %
NEUTROPHILS # BLD AUTO: 7.7 K/UL
NEUTROPHILS NFR BLD: 90 %
PLATELET # BLD AUTO: 134 K/UL
PMV BLD AUTO: 9.3 FL
POCT GLUCOSE: 121 MG/DL (ref 70–110)
POCT GLUCOSE: 156 MG/DL (ref 70–110)
POCT GLUCOSE: 167 MG/DL (ref 70–110)
POCT GLUCOSE: 72 MG/DL (ref 70–110)
RBC # BLD AUTO: 3.33 M/UL
WBC # BLD AUTO: 8.54 K/UL

## 2017-02-24 PROCEDURE — 97150 GROUP THERAPEUTIC PROCEDURES: CPT

## 2017-02-24 PROCEDURE — 85025 COMPLETE CBC W/AUTO DIFF WBC: CPT

## 2017-02-24 PROCEDURE — 25000003 PHARM REV CODE 250: Performed by: PHYSICAL MEDICINE & REHABILITATION

## 2017-02-24 PROCEDURE — 97535 SELF CARE MNGMENT TRAINING: CPT

## 2017-02-24 PROCEDURE — 92507 TX SP LANG VOICE COMM INDIV: CPT

## 2017-02-24 PROCEDURE — 36415 COLL VENOUS BLD VENIPUNCTURE: CPT

## 2017-02-24 PROCEDURE — 97110 THERAPEUTIC EXERCISES: CPT

## 2017-02-24 PROCEDURE — 97530 THERAPEUTIC ACTIVITIES: CPT

## 2017-02-24 PROCEDURE — 12800000 HC REHAB SEMI-PRIVATE ROOM

## 2017-02-24 PROCEDURE — 99233 SBSQ HOSP IP/OBS HIGH 50: CPT | Mod: ,,, | Performed by: PHYSICAL MEDICINE & REHABILITATION

## 2017-02-24 PROCEDURE — 97542 WHEELCHAIR MNGMENT TRAINING: CPT

## 2017-02-24 PROCEDURE — 25000003 PHARM REV CODE 250: Performed by: ORTHOPAEDIC SURGERY

## 2017-02-24 RX ADMIN — OXYCODONE HYDROCHLORIDE 10 MG: 5 TABLET ORAL at 05:02

## 2017-02-24 RX ADMIN — CARVEDILOL 25 MG: 25 TABLET, FILM COATED ORAL at 08:02

## 2017-02-24 RX ADMIN — HEPARIN SODIUM 5000 UNITS: 5000 INJECTION, SOLUTION INTRAVENOUS; SUBCUTANEOUS at 09:02

## 2017-02-24 RX ADMIN — HEPARIN SODIUM 5000 UNITS: 5000 INJECTION, SOLUTION INTRAVENOUS; SUBCUTANEOUS at 01:02

## 2017-02-24 RX ADMIN — FAMOTIDINE 20 MG: 20 TABLET, FILM COATED ORAL at 08:02

## 2017-02-24 RX ADMIN — CARVEDILOL 25 MG: 25 TABLET, FILM COATED ORAL at 09:02

## 2017-02-24 RX ADMIN — STANDARDIZED SENNA CONCENTRATE AND DOCUSATE SODIUM 1 TABLET: 8.6; 5 TABLET, FILM COATED ORAL at 08:02

## 2017-02-24 RX ADMIN — GABAPENTIN 300 MG: 300 CAPSULE ORAL at 01:02

## 2017-02-24 RX ADMIN — ZOLPIDEM TARTRATE 5 MG: 5 TABLET ORAL at 09:02

## 2017-02-24 RX ADMIN — TAMSULOSIN HYDROCHLORIDE 0.4 MG: 0.4 CAPSULE ORAL at 08:02

## 2017-02-24 RX ADMIN — METHOCARBAMOL 750 MG: 750 TABLET ORAL at 05:02

## 2017-02-24 RX ADMIN — OXYCODONE HYDROCHLORIDE 10 MG: 5 TABLET ORAL at 09:02

## 2017-02-24 RX ADMIN — AMLODIPINE BESYLATE 10 MG: 10 TABLET ORAL at 08:02

## 2017-02-24 RX ADMIN — FAMOTIDINE 20 MG: 20 TABLET, FILM COATED ORAL at 09:02

## 2017-02-24 RX ADMIN — QUINAPRIL 20 MG: 20 TABLET ORAL at 08:02

## 2017-02-24 RX ADMIN — GABAPENTIN 300 MG: 300 CAPSULE ORAL at 09:02

## 2017-02-24 RX ADMIN — TAMSULOSIN HYDROCHLORIDE 0.4 MG: 0.4 CAPSULE ORAL at 09:02

## 2017-02-24 RX ADMIN — HEPARIN SODIUM 5000 UNITS: 5000 INJECTION, SOLUTION INTRAVENOUS; SUBCUTANEOUS at 05:02

## 2017-02-24 RX ADMIN — GABAPENTIN 300 MG: 300 CAPSULE ORAL at 05:02

## 2017-02-24 NOTE — PROGRESS NOTES
Pt without new c/o.    Interval hx:  I have reviewed the pt's HPI and PFSH and it is unchanged from admission.     VSS - Temp:  [98.3 °F (36.8 °C)-98.4 °F (36.9 °C)] 98.3 °F (36.8 °C)  Pulse:  [88-90] 90  Resp:  [16-18] 16  SpO2:  [95 %-97 %] 97 %  BP: (117-120)/(65-70) 120/70    Review of Systems   Eyes: Negative for blurred vision.   Respiratory: Negative for cough.   Cardiovascular: Negative for chest pain.   Gastrointestinal: Negative for nausea and vomiting.   Genitourinary: Negative for dysuria.   Musculoskeletal: Positive for back pain and neck pain. Negative for myalgias.   Skin: Negative.   Neurological: Positive for tingling and weakness. Negative for dizziness, sensory change and headaches.   Psychiatric/Behavioral: Negative for depression. The patient has insomnia.      Physical Exam   Constitutional: He is oriented to person, place, and time. He appears well-nourished.   Eyes: EOM are normal. Pupils are equal, round, and reactive to light.   Neck: Neck supple.   Cardiovascular: Normal rate.   Pulmonary/Chest: Effort normal.   Abdominal: Soft.   Musculoskeletal:   BUEs AROM WNL  LLE AROM WNL  RLE with diminished HF   Neurological: He is oriented to person, place, and time.   LUE 4-/5 except 3+/5 EE  RUE 4/5 except 4-/5 EE  LLE 4/5  RLE 3+/5 HF/HE, 4-/5 KF/KE/DF/PF   Skin: No rash noted.   Psychiatric: He has a normal mood and affect.     Assessment:    1. S/P C4-7 fusion with residual cervical myelopathy -- unchanged.   Sit-->stand MDA/MXA, T/Fs MDA/MXA (improved), 13' MNA/MDA RW.  UBD MXA, LBD DEP, toileting DEP on 2/18.  Refusing some therapy activities.    -- moderately confused -- has been since admission.  Oxycontin d/c'd with some improvement but confusion waxes/wanes.  This appears to be delirium.  MRI of brain did not show parenchymal mets.  I will change oxycodone to hydromorphone for pain which is signficant -- he c/o pain in all therapy sessions.   Insomnia seems improved with Ambien.  CBC  WNL.  2. DM2 -- monitor on dexamethasone.  Reduced basal insulin from 18u to 14u 2/21 and prandial insulin from 6u to 4u 2/20 with some improvement but now low again -- will reduce basal insulin to 12u.  Cont to monitor.  Will also ask for supervised meals as pt is eating very little.    3. HTN -- controlled  4. Neuropathic pain -- numbness/tingling of fingers -- on gabapentin  5. CKD -- acute exacerbation now improved (CRT 1.9 on 2/20, 1.3 on 2/23) -- encouraging fluids.    6. Chronic hyponatremia -- stable (130 on 2/23).       Future Appointments  Date Time Provider Department Center   3/7/2017 11:00 AM Franklyn Ng MD [facility] SPINE [address]   3/8/2017 10:30 AM LAB, HEMON CANCER BLDG [facility] LAB HO [facility]elio Ames   3/8/2017 11:30 AM Bill Goetz MD [facility] HEM ONC [facility] Ames

## 2017-02-24 NOTE — PROGRESS NOTES
Occupational Therapy  Tx FIM  Rah Puente   MRN: [id number]      02/24/17 1000   Transfers   Toilet 1   Self Care   Eating 5   Grooming 5   Toileting 1   Communication   Comprehension 4   Mode B   Expression 3   Mode B   Social Cognition   Social Interaction 5   Problem Solving 2   Memory 2       Uri Herrera IRENE 2/24/2017

## 2017-02-24 NOTE — PROGRESS NOTES
Physical Therapy  Group Volleyball Treatment    Rah Puente   MRN: [id number]      02/24/17 1445   PT Time Calculation   PT Start Time 1445   PT Stop Time 1530   PT Total Time (min) 45 min   Treatment   Treatment Type Treatment   PT/PTA PT   General   PT Received On 02/24/17   Missed Time Reason Not applicable   Family/Caregiver Present No   Patient Found (position) Seated in wheelchair   Pt found with cervical collar   Precautions   General Precautions diabetic;fall   Required Braces or Orthoses Yes   Cervical Brace [place]   Subjective   Patient states Pt agreeable to group treatment   Pain/Comfort   Pain Rating no pain   Plan   Planned Therapy Intervention Continue with current plan   Therapy Frequency 2 times/day;Monday-Friday;Saturday or Sunday   Physical Therapy Follow-up   PT Follow-up? Yes   PT - Next Visit Date 02/25/17   Treatment/Billable Minutes   Therapeutic Activity Group 45   Total Time 45     Patient participated in 45 minute volleyball group activity.  The group activity challenged dynamic standing/sitting skills, bilateral upper extremity strengthening, cardiovascular and respiratory capacity, hand -eye coordination, visual scanning and social affect/mood.  The patient performed this activity at w/c level with SPV.  The patient required 0 rest breaks during this activity.  Patient performed activity using bilateral upper extremities to volley the ball, lateral arm movement noted throughout the activity.  Endurance improved, no pain complaints voiced are observed , social affect good as patient was observed throughout this activity ie., appropriately engaged in social exchange with peers and staff.    Alisa Barillas DPT  2/24/2017

## 2017-02-24 NOTE — PROGRESS NOTES
"Physical Therapy   AM Treatment    Rah Puente   MRN: [id number]      02/24/17 1118   PT Time Calculation   PT Start Time 1118   PT Stop Time 1203   PT Total Time (min) 45 min   Treatment   Treatment Type Treatment   PT/PTA PT   General   PT Received On 02/24/17   Missed Time Reason Not applicable   Family/Caregiver Present No   Patient Found (position) Seated in wheelchair   Pt found with cervical collar   Precautions   General Precautions diabetic;fall   Required Braces or Orthoses Yes   Cervical Brace [place]   Subjective   Patient states "The pain is killing me, but I'm alright."   Pain/Comfort   Pain Rating 6/10   Location - Side Bilateral   Location - Orientation generalized   Location neck   Pain Addressed Distraction   Transfers   Transfer yes   Wheelchair Activities   Propulsion Yes   Propulsion Type 1 Manual   Level 1 Level tile   Method 1 Right lower extremity;Left lower extremity   Level of Assistance 1 Minimum assistance   Description/ Details 1 2 trials of 50 ft with multiple stops during trials due to patient distractibilty and fatigue. Pt required multiple verbal cues from PT to stay on task   Gait   Gait No   Seated   Seated-Exercises Lower extremity   Seated-Exercise Type Long arc quads;Hip flexion   Seated-Exercise Comments LAQ for 2 x 15 reps, HF for 2 x 10 reps. Pt required multiple cues for participation in activity   Other Activities   Other Activities Other (Comment)  (wheelchair <> commode transfer)   Comments Pt stood from wheelchair with Min A for hip elevation and use of grab bar to perform stand pivot towards commode. Pt stood for ~ 30" so that PT could assist with pulling down pants and brief before patient sat down on commode with Min A for control of descent to a low commode. Pt had a continent episode of bowel and bladder. Pt stood from commode with Min A for hip elevation and PT performed pericare and lower body ~30" before patient pivoted and sat back down in wheelchair with " Min A for control of descent.   Activity Tolerance   Activity Tolerance Patient limited by pain;Patient limited by fatigue   After Treatment   Patient Position After Treatment Seated in wheelchair  (in dining room)   Patient after treatment left (safety belt in place)   Assessment   Prognosis Fair   Problem List Decreased strength;Decreased endurance;Impaired balance;Decreased mobility;Decreased cognition;Impaired judgement;Decreased safety awareness;Impaired vision;Impaired sensation;Pain   Session Assessment (slow progress limited by poor cognition)   Assessment Pt requires a lot of verbal cueing and encouragement to participate in therapy activities, mostly due to poor cognition, reported fatigue, and decreased motivation. Pt is Min A for wheelchair <> BSC transfer, but requires Max A for pericare and LB dressing. PT expects that patient progress will continue to be slow due to poor cognition, decreased motivation, and fatigue.   Level of Motivation/Participation fair   Barriers to Discharge Inaccessible home environment;Decreased caregiver support   Barriers to Discharge Comments 5 JULIA and lives alone   Discharge Recommendations   Equipment Needed After Discharge wheelchair   Discharge Facility/Level Of Care Needs home health PT   Plan   Planned Therapy Intervention Continue with current plan   Therapy Frequency 2 times/day;Monday-Friday;Saturday or Sunday   Physical Therapy Follow-up   PT Follow-up? Yes   PT - Next Visit Date 02/25/17   Treatment/Billable Minutes   Therapeutic Activity 20   Therapeutic Exercise 10   Train/Wheelchair Management 15   Total Time 45     Alisa aBrillas DPT  2/24/2017

## 2017-02-24 NOTE — PLAN OF CARE
Problem: Patient Care Overview  Goal: Plan of Care Review  Outcome: Ongoing (interventions implemented as appropriate)  Pt care plan updated and individualized as needed and progress continues.  See associated flowsheets for relevant documentation. Frequent rounds made per protocol to maintain pt safety and meet pt needs.  Continuing to monitor and follow up as needed.        Problem: Fall Risk (Adult)  Goal: Absence of Falls  Patient will demonstrate the desired outcomes by discharge/transition of care.   Outcome: Ongoing (interventions implemented as appropriate)  Pt remains free from falls or trauma thus far this shift.        Problem: Pressure Ulcer Risk (Otis Scale) (Adult,Obstetrics,Pediatric)  Goal: Skin Integrity  Patient will demonstrate the desired outcomes by discharge/transition of care.   Outcome: Ongoing (interventions implemented as appropriate)  Pt turned and repositioned as needed to maintain skin integrity.  No new breakdown noted.

## 2017-02-24 NOTE — PROGRESS NOTES
Physical Therapy   Care Plan/FIM    Rah Puente   MRN: [id number]      02/24/17 1200   Transfers   Toilet 2   Locomotion   Distance Wheelchair 1   Wheelchair 1   Mode C   Alisa Barillas DPT  2/24/2017

## 2017-02-24 NOTE — PLAN OF CARE
Problem: Patient Care Overview  Goal: Plan of Care Review  Outcome: Ongoing (interventions implemented as appropriate)  Plan of care reviewed.  Problem: Diabetes, Type 2 (Adult)  Intervention: Support/Optimize Psychosocial Response to Condition  Is compliant with treatment. Needs encouragment to eat.  Problem: Fall Risk (Adult)  Intervention: Monitor/Assist with Self Care  Reminded to call for assistance when needed. Bed and WC alarms remain activated.

## 2017-02-24 NOTE — PROGRESS NOTES
"Occupational Therapy  AM Tx Session  Rah Puente   MRN: [id number]      02/24/17 0929   OT Time Calculation   OT Start Time 0929   OT Stop Time 1014   OT Total Time (min) 45 min   General   OT Date of Treatment 02/24/17   Family/Caregiver Present No   Patient Found (position) Seated on bedside commode   Pt found with cervical collar   Precautions   General Precautions diabetic;fall   Required Braces or Orthoses Yes   Cervical Brace [place]   Visual/Auditory Vision impaired   Subjective   Patient states "a lot in my upper back and a little in my lower"   Pain/Comfort   Pain Rating 6/10   Location - Orientation upper   Location back   Pain Addressed Distraction   Sit to Stand   Sit <> Stand Assistance Total Assistance   Sit <> Stand Assistive Device No Assistive Device   Trials/Comments x2 to assist   Stand to Sit   Assistance Maximum Assistance;Total Assistance   Assistive Device No Assistive Device   Trials/Comments x2 to assist   Toilet Transfer   Toilet Transfer Technique Stand Pivot   Toilet Transfer Assistance Total Assistance   Toilet Transfer Assistive Device No Assistive Device   Trials/Comments x2 to assist; very unsteady during pivot with poor standing posture   Feeding   Feeding Level of Assistance Minimum assistance   Feeding Where Assessed Wheelchair   Feeding Comments (A) for packets and managing food onto utensil   Grooming   Grooming Level of Assistance Stand by assistance   Hand Washing Level of Assistance Stand by assistance   Grooming Where Assessed Sitting sinkside   Grooming Comments cues to bring hand onto sink edge   Toileting   Toileting Level of Assistance Total assistance   Toileting Where Assessed Bedside commode   Toileting Comments (A) for hygiene and clothing management   Exercise Tools   UE Ergometer 8min forward min resistance for endurance   Activity Tolerance   Activity Tolerance Patient tolerated treatment well   Medical Staff Made Aware Rn   After Treatment   Patient Position " After Treatment Seated in wheelchair  (safety belt)   Patient after treatment left call button in reach   Assessment   Prognosis Poor   Problem List Decreased Self Care skills;Decreased upper extremity range of motion;Decreased upper extremity strength;Decreased safe judgment during ADL;Decreased cognition;Decreased endurance;Decreased fine motor control;Decreased functional mobility;Decreased gross motor control;Decreased IADLs;Decreased Function of right upper extremity;Decreased Function of left upper extremity;Decreased trunk control for functional activities   Assessment Pt required MAX cues for wc management and safety during all aspects of functional mobility and MAX cues for recall 2/2 decreased cognition/memory; very confused Pt is very unsteady and poor standing posture requiring x2 to assist for safety. Pt would continue to benefit from OT services to increase functional mobility.   Level of Motivation/Participation Fair   Barriers to Discharge Home environment accessibility limitations   Discharge Recommendations   Equipment Needed After Discharge wheelchair   Discharge Facility/Level Of Care Needs home with home health   Plan   Plan Continue with current plan   Therapy Frequency 2 times/day;Monday-Friday;Saturday or Sunday   Treatment/Billable Minutes   Self Care/Home Management 35   Therapeutic Exercise 10   Total Time 45     The RESHMAR and JJ have collaborated and discussed the patient's status, treatment plan and progress toward established goals.     IRENE Theodore 2/24/2017

## 2017-02-24 NOTE — PROGRESS NOTES
"Speech Therapy   Treatment    Rah Puente   MRN: [id number]            02/24/17 1300   Speech Time Calculation   Speech Start Time 1300   Speech Stop Time 1345   Speech Total (min) 45 min   General Information   SLP Treatment Date 02/24/17   General Observations Pt seen while sitting upright in w/c in ST office. Upon St arrival, pt sitting up in w/c in dining room with meal tray infront of him. Pt did not eat meal. At end of ST session, ST asked pt if he would like something to eat. Pt agreed to eat apple sauce, clinician fed pt 4oz apple sauce. Pt demonstrated increased confusion t/o session, closed eyes intermittently. Demonstrated episodes of increased/decreased alertness sporadically t/o session. Pt also made various comments demonstrating confusion of situation/location. ("Could you hand me those one at a time?" "We weren't in my car yet." "It's still a good time.")   General Precautions diabetic;fall   Visual/Auditory Vision impaired   Subjective   Patient states "I'm tired, I've had a long day, can I get a pass?"   Pain/Comfort   Pain Rating 7/10   Location - Orientation lower   Location gluteal   Pain Addressed Pre-medicate for activity   Pain Comment "I can't keep sitting like this, my butt hurts." ST able to distract pt from pain with therapy task.       SLP Cognitive Treatment   Treatment Detail (SLP Cognitive Treatment) Pt disoriented x3, oriented to name only. In an auditory comprehension task (1-2 sentence length passage), pt was asked to immediately recall information from the passage, given a carrier phrase. Pt recalled information with 0% accy, despite max verbal cues.  When asked whether patient forgot or if he was not paying attention, patient stated, "I forgot."  Patient provided appropriate responses to social situations with 0% accy. Pt frequently answered, "I really don't know," or patient did not respond at all.  Patient demonstrated poor attention t/o session, max repetitions " requested.         SLP Treatment: Verbal Expression   Treatment Detail (SLP Verbal Expression) Patient completed a popular sentence completion task with 90% accy indly. Patient completed a (less common) open-ended sentence completion task with 70% accy, given max repetitions.     Assessment   SLP Diagnosis cog-ling deficits   Prognosis Fair   Session Assessment no significant change in cognitive language   Level of Motivation/Participation fair   Plan   Plan Continue with current plan   Therapy Frequency Monday-Friday   SLP Follow-up   SLP Follow-up? Yes   Treatment/Billable Minutes   Speech Therapy Individual 45   Total Time 45       FIM Scores  Comprehension:3  Expression: 3  Social Interaction:1  Problem Solvin  Memory: 1      Comprehension:   Complex= humor, finances, rationale for medical treatment(hip precautions, pressure relief)  Basic= pain, hunger, thirst, bathroom needs, cold, nutrition, sleep      Understands basic 50-74%- May need parts of sentences repeated (3)      Expression:  Expresses basic 50-74% of time - Needs to repeat parts of sentences (3)      Social Interaction:  Interacts appropriately less than 25% of time - May be withdrawn or combative (1)      Problem Solving:  Solves basic problems less than 25% of time - Needs direction nearly all the time or does not effectively solve problems and my need a restraint for safety. Bed alarm on at all times. (1)      Memory:  Recognizes, recalls, or executes 1 step request less than 25% of time (1)        AKILAH Dominguez-SLP  2017

## 2017-02-25 LAB
POCT GLUCOSE: 152 MG/DL (ref 70–110)
POCT GLUCOSE: 163 MG/DL (ref 70–110)
POCT GLUCOSE: 174 MG/DL (ref 70–110)
POCT GLUCOSE: 219 MG/DL (ref 70–110)

## 2017-02-25 PROCEDURE — 97530 THERAPEUTIC ACTIVITIES: CPT

## 2017-02-25 PROCEDURE — 92507 TX SP LANG VOICE COMM INDIV: CPT

## 2017-02-25 PROCEDURE — 25000003 PHARM REV CODE 250: Performed by: PHYSICAL MEDICINE & REHABILITATION

## 2017-02-25 PROCEDURE — 97110 THERAPEUTIC EXERCISES: CPT

## 2017-02-25 PROCEDURE — 99232 SBSQ HOSP IP/OBS MODERATE 35: CPT | Mod: ,,, | Performed by: PHYSICAL MEDICINE & REHABILITATION

## 2017-02-25 PROCEDURE — 25000003 PHARM REV CODE 250: Performed by: ORTHOPAEDIC SURGERY

## 2017-02-25 PROCEDURE — 97150 GROUP THERAPEUTIC PROCEDURES: CPT

## 2017-02-25 PROCEDURE — 12800000 HC REHAB SEMI-PRIVATE ROOM

## 2017-02-25 RX ADMIN — ZOLPIDEM TARTRATE 5 MG: 5 TABLET ORAL at 09:02

## 2017-02-25 RX ADMIN — HYDROMORPHONE HYDROCHLORIDE 6 MG: 2 TABLET ORAL at 12:02

## 2017-02-25 RX ADMIN — GABAPENTIN 300 MG: 300 CAPSULE ORAL at 05:02

## 2017-02-25 RX ADMIN — TAMSULOSIN HYDROCHLORIDE 0.4 MG: 0.4 CAPSULE ORAL at 09:02

## 2017-02-25 RX ADMIN — TAMSULOSIN HYDROCHLORIDE 0.4 MG: 0.4 CAPSULE ORAL at 08:02

## 2017-02-25 RX ADMIN — CARVEDILOL 25 MG: 25 TABLET, FILM COATED ORAL at 08:02

## 2017-02-25 RX ADMIN — INSULIN ASPART 4 UNITS: 100 INJECTION, SOLUTION INTRAVENOUS; SUBCUTANEOUS at 05:02

## 2017-02-25 RX ADMIN — OXYCODONE HYDROCHLORIDE 10 MG: 5 TABLET ORAL at 04:02

## 2017-02-25 RX ADMIN — AMLODIPINE BESYLATE 10 MG: 10 TABLET ORAL at 08:02

## 2017-02-25 RX ADMIN — CARVEDILOL 25 MG: 25 TABLET, FILM COATED ORAL at 09:02

## 2017-02-25 RX ADMIN — FAMOTIDINE 20 MG: 20 TABLET, FILM COATED ORAL at 08:02

## 2017-02-25 RX ADMIN — GABAPENTIN 300 MG: 300 CAPSULE ORAL at 09:02

## 2017-02-25 RX ADMIN — HEPARIN SODIUM 5000 UNITS: 5000 INJECTION, SOLUTION INTRAVENOUS; SUBCUTANEOUS at 02:02

## 2017-02-25 RX ADMIN — INSULIN ASPART 2 UNITS: 100 INJECTION, SOLUTION INTRAVENOUS; SUBCUTANEOUS at 12:02

## 2017-02-25 RX ADMIN — HEPARIN SODIUM 5000 UNITS: 5000 INJECTION, SOLUTION INTRAVENOUS; SUBCUTANEOUS at 09:02

## 2017-02-25 RX ADMIN — GABAPENTIN 300 MG: 300 CAPSULE ORAL at 02:02

## 2017-02-25 RX ADMIN — FAMOTIDINE 20 MG: 20 TABLET, FILM COATED ORAL at 09:02

## 2017-02-25 RX ADMIN — HEPARIN SODIUM 5000 UNITS: 5000 INJECTION, SOLUTION INTRAVENOUS; SUBCUTANEOUS at 05:02

## 2017-02-25 RX ADMIN — OXYCODONE HYDROCHLORIDE 10 MG: 5 TABLET ORAL at 05:02

## 2017-02-25 NOTE — PROGRESS NOTES
Occupational Therapy  AM Group Session   Rah Puente   MRN: [id number]          02/25/17 1130   OT Time Calculation   OT Start Time 1130   OT Stop Time 1200   OT Total Time (min) 30 min   General   OT Date of Treatment 02/25/17   Treatment/Billable Minutes   Therapeutic Group 30   Total Time 30   Patient participated in 30 minute seated high endurance group. The group activity challenged bilateral upper extremity and lower extremity strengthening. In addition, cardiovascular and functional endurance were challenged during this group. Pt utulized 1# [place]   during the following exercises.    Patient completed 20 reps x 2 sets bicep curls, side raises, shoulder flexion, shoulder extension (in UE circuit)    Patient completed 20 reps x 2 sets hip flexion, knee flexion, knee extension, toe and heel taps (in LE circuit)    Fwd/reverse speed bag, front/overhead arm clap, running 2/20x reps    Pt required short rest breaks during therapeutic exercises. These activities were performed in a group setting to encourage participation with peers and social interaction skills.     The RESHMAR and JJ have collaborated and discussed the patient's status, treatment plan and progress toward established goals.     Uri Herrera, IRENE 2/25/2017

## 2017-02-25 NOTE — PROGRESS NOTES
"Occupational Therapy  PM Treatment Session  Rah Puente   MRN: [id number]        02/25/17 1446   OT Time Calculation   OT Start Time 1446   OT Stop Time 1531   OT Total Time (min) 45 min   General   OT Date of Treatment 02/25/17   Family/Caregiver Present No   Patient Found (position) Supine in bed   Pt found with cervical collar   Precautions   General Precautions diabetic;fall   Required Braces or Orthoses Yes   Cervical Brace [place]   Visual/Auditory Vision impaired   Subjective   Patient states "yeah, yeah I got it"   Pain/Comfort   Pain Rating 5/10   Location - Orientation upper   Location back   Pain Addressed Distraction   Bed Mobility   Scooting/Bridging Minimum Assistance   Scooting/Bridging Comments to EOB   Supine to Sit Moderate Assistance   Supine to Sit Comments (A) for trunk   Sit to Stand   Sit <> Stand Assistance Total Assistance   Sit <> Stand Assistive Device No Assistive Device   Stand to Sit   Assistance Total Assistance   Assistive Device No Assistive Device   Trials/Comments v/c's to reach back   Bed to Chair   Bed <> Chair Technique Squat Pivot   Bed <> Chair Transfer Assistance Maximum Assistance   Bed <> Chair Assistive Device No Assistive Device   Trials/Comments EOB > WC   Feeding   Feeding Level of Assistance Minimum assistance   Feeding Where Assessed Wheelchair   Exercise Tools   Exercise Tools Yes   UE Ergometer 8min forward min resistance for endurance   Rickshaw 5# 3/20x reps   Other Exercise Tool 1 seated rows: 1/24x reps; 1/20x reps for UE strengthening   Additional Activities   Additional Activities Other (Comment)   Additional Activities Comments Pt seated @ wc level for using BUE AROM 'graded totempole' to promote functional use of RUE.  (Pt's sister present)   Activity Tolerance   Activity Tolerance Patient tolerated treatment well   After Treatment   Patient Position After Treatment Seated in wheelchair  (safety belt)   Patient after treatment left (with family) "   Assessment   Prognosis Poor   Problem List Decreased Self Care skills;Decreased upper extremity range of motion;Decreased upper extremity strength;Decreased safe judgment during ADL;Decreased cognition;Decreased endurance;Decreased fine motor control;Decreased functional mobility;Decreased gross motor control;Decreased IADLs;Decreased Function of right upper extremity;Decreased Function of left upper extremity;Decreased trunk control for functional activities   Assessment Pt suprisingly calm and with no c/o throughout session of pain and good participation, however required short rest breaks throughout. Pt would continue to benefit from OT services to increase functional mobility.   Level of Motivation/Participation Good   Barriers to Discharge Home environment accessibility limitations   Discharge Recommendations   Equipment Needed After Discharge wheelchair   Discharge Facility/Level Of Care Needs home with home health   Plan   Plan Continue with current plan   Therapy Frequency 2 times/day;Monday-Friday;Saturday or Sunday   Treatment/Billable Minutes   Therapeutic Activity 15   Therapeutic Exercise 30   Total Time 45         The ZORAIDA and JJ have collaborated and discussed the patient's status, treatment plan and progress toward established goals.     IRENE Theodore 2/25/2017

## 2017-02-25 NOTE — PROGRESS NOTES
"Speech Therapy   Treatment    Rah Puente   MRN: [id number]            02/25/17 1300   Speech Time Calculation   Speech Start Time 1300   Speech Stop Time 1345   Speech Total (min) 45 min   General Information   SLP Treatment Date 02/25/17   General Observations Pt seen while sitting upright in pt room. Pt ate x3 bites of meal then stated "I'm full." Pt then ate 4 oz ice cream with no overt s/s aspiration. Pt demonstrated increased socially appropriate responses and increased alertness. Patient's performance significantly increased in expressive language tasks, receptive language task (auditory comprehension) and social interaction skills. Noted that patient's family was present before/after (pt's sister stepped out during session) session, which may have contributed to pt's increased motivation/participation. However, pt demonstrated confusion through various comments ("Why don't you just leave me alone?" and "Ton's the raúl you were trying to get to eat in your group session.")   General Precautions diabetic;fall   Visual/Auditory Vision impaired   Subjective   Patient states "You've got to wonder how many of these cuff they put on a day," referring to BP cuff on bed.    Pain/Comfort   Pain Rating no pain       SLP Cognitive Treatment   Treatment Detail (SLP Cognitive Treatment) Pt oriented to person and [place]. Disoriented to place and date.  ST encouraged pt to use visual aid to irent to time/date, pt stated, "I can't see that far."  Patient recalled visitors (sister, sister's , sister's 's brother).  Patient provided responses to social interaction situations with 80% accy (significantly increased from previous session).       SLP Treatment: Verbal Expression   Treatment Detail (SLP Verbal Expression) Patient completed sentence completion task with 100% accy, given min repetitions per patient request.  Patient completed open-ended sentence completion task with 90% accy.  Patient named objects, " given a description, with 70% accy.         SLP Treatment: Auditory Comprehension   Treatment Detail (SLP Auditory Comprehension) Patient answered auditory comprehension questions with 70% accy indepedently, 80% accy given min verbal cues.    Assessment   SLP Diagnosis cog ling deficits   Prognosis Good   Session Assessment progressing toward goals   Level of Motivation/Participation good   Plan   Plan Continue with current plan   Therapy Frequency Monday-Friday   SLP Follow-up   SLP Follow-up? Yes   Treatment/Billable Minutes   Speech Therapy Individual 45   Total Time 45     FIM Scores  Comprehension:3  Expression: 3  Social Interaction:2  Problem Solvin  Memory: 1      Comprehension:   Complex= humor, finances, rationale for medical treatment(hip precautions, pressure relief)  Basic= pain, hunger, thirst, bathroom needs, cold, nutrition, sleep      Understands basic 50-74%- May need parts of sentences repeated (3)      Expression:  Expresses basic 50-74% of time - Needs to repeat parts of sentences (3)      Social Interaction:  Interacts appropriately 25-49% of time - Needs frequent redirection  (2)      Problem Solving:  Solves basic problems less than 25% of time - Needs direction nearly all the time or does not effectively solve problems and my need a restraint for safety. Bed alarm on at all times. (1)      Memory:  Recognizes, recalls, or executes 1 step request less than 25% of time (1)    AKILAH Dominguez-SLP  2017

## 2017-02-25 NOTE — PROGRESS NOTES
Occupational Therapy  PM Tx Session  Rah Puente   MRN: [id number]          02/25/17 1345   OT Time Calculation   OT Start Time 1345   OT Stop Time 1400   OT Total Time (min) 15 min   General   OT Date of Treatment 02/25/17   Treatment/Billable Minutes   Therapeutic Exercise 15   Total Time 15     Therex:    - RUE AROM / LUE AAROM (chest press, bicep curl, wrist flexion) 3/15x reps increased time/MOD encouragement        The LOTR and GARDNER have collaborated and discussed the patient's status, treatment plan and progress toward established goals.     Uri Herrera, IRENE 2/25/2017

## 2017-02-26 LAB
POCT GLUCOSE: 129 MG/DL (ref 70–110)
POCT GLUCOSE: 137 MG/DL (ref 70–110)
POCT GLUCOSE: 164 MG/DL (ref 70–110)
POCT GLUCOSE: 203 MG/DL (ref 70–110)
POCT GLUCOSE: 66 MG/DL (ref 70–110)

## 2017-02-26 PROCEDURE — 99232 SBSQ HOSP IP/OBS MODERATE 35: CPT | Mod: ,,, | Performed by: PHYSICAL MEDICINE & REHABILITATION

## 2017-02-26 PROCEDURE — 25000003 PHARM REV CODE 250: Performed by: ORTHOPAEDIC SURGERY

## 2017-02-26 PROCEDURE — 25000003 PHARM REV CODE 250: Performed by: PHYSICAL MEDICINE & REHABILITATION

## 2017-02-26 PROCEDURE — 12800000 HC REHAB SEMI-PRIVATE ROOM

## 2017-02-26 RX ADMIN — HYDROMORPHONE HYDROCHLORIDE 6 MG: 2 TABLET ORAL at 10:02

## 2017-02-26 RX ADMIN — INSULIN ASPART 4 UNITS: 100 INJECTION, SOLUTION INTRAVENOUS; SUBCUTANEOUS at 09:02

## 2017-02-26 RX ADMIN — INSULIN ASPART 4 UNITS: 100 INJECTION, SOLUTION INTRAVENOUS; SUBCUTANEOUS at 05:02

## 2017-02-26 RX ADMIN — TAMSULOSIN HYDROCHLORIDE 0.4 MG: 0.4 CAPSULE ORAL at 09:02

## 2017-02-26 RX ADMIN — FAMOTIDINE 20 MG: 20 TABLET, FILM COATED ORAL at 09:02

## 2017-02-26 RX ADMIN — HEPARIN SODIUM 5000 UNITS: 5000 INJECTION, SOLUTION INTRAVENOUS; SUBCUTANEOUS at 09:02

## 2017-02-26 RX ADMIN — GABAPENTIN 300 MG: 300 CAPSULE ORAL at 02:02

## 2017-02-26 RX ADMIN — CARVEDILOL 25 MG: 25 TABLET, FILM COATED ORAL at 09:02

## 2017-02-26 RX ADMIN — GABAPENTIN 300 MG: 300 CAPSULE ORAL at 05:02

## 2017-02-26 RX ADMIN — AMLODIPINE BESYLATE 10 MG: 10 TABLET ORAL at 09:02

## 2017-02-26 RX ADMIN — GABAPENTIN 300 MG: 300 CAPSULE ORAL at 09:02

## 2017-02-26 RX ADMIN — QUINAPRIL 20 MG: 20 TABLET ORAL at 09:02

## 2017-02-26 RX ADMIN — HEPARIN SODIUM 5000 UNITS: 5000 INJECTION, SOLUTION INTRAVENOUS; SUBCUTANEOUS at 02:02

## 2017-02-26 RX ADMIN — HEPARIN SODIUM 5000 UNITS: 5000 INJECTION, SOLUTION INTRAVENOUS; SUBCUTANEOUS at 05:02

## 2017-02-26 RX ADMIN — ZOLPIDEM TARTRATE 5 MG: 5 TABLET ORAL at 09:02

## 2017-02-26 RX ADMIN — OXYCODONE HYDROCHLORIDE 10 MG: 5 TABLET ORAL at 12:02

## 2017-02-26 RX ADMIN — INSULIN ASPART 4 UNITS: 100 INJECTION, SOLUTION INTRAVENOUS; SUBCUTANEOUS at 12:02

## 2017-02-26 NOTE — PROGRESS NOTES
Pt's current BP=84/50  And HR=87. 10 mg of Quinapril is available, but medication was held d/t decreased BP. Mr Puente was asymptomatic. He was encouraged to increase PO fluid intake. Pt verbalized understanding of information.

## 2017-02-26 NOTE — PLAN OF CARE
Problem: Patient Care Overview  Goal: Plan of Care Review  Outcome: Ongoing (interventions implemented as appropriate)  NAEON. Cervical collar in place, pt likes to keep in place at all times. Medicated with oxy 10mg po for c/o pain. Mepilex dressing applied to sacral DTI. Pt eating about 50% of meals, BG WNL and regular insulin coverage administered with meals. Bed in lowest position with wheels locked and call light within reach. Continue to monitor.

## 2017-02-26 NOTE — PROGRESS NOTES
Pt has an abrasion on his rt lateral foot, and he has a wound on the dorsal area of his lt 3 rd toe. Both areas were cleaned with NS, pat dry, and bandaides applied. Procedure tolerated well.

## 2017-02-26 NOTE — PLAN OF CARE
Problem: Patient Care Overview  Goal: Plan of Care Review  Outcome: Ongoing (interventions implemented as appropriate)  Plan of care reviewed with pt. Pt resting in bed. Bed in low position, wheels locked. Call light within reach. Side rails up x2. Frequent rounds made to ensure pt safety, comfort, and pain control management. Will continue to monitor.        Problem: Fall Risk (Adult)  Goal: Identify Related Risk Factors and Signs and Symptoms  Related risk factors and signs and symptoms are identified upon initiation of Human Response Clinical Practice Guideline (CPG)   Outcome: Ongoing (interventions implemented as appropriate)  Instructed pt on safety, oriented pt to call light and pt verbalized understanding to call before getting out of bed. Pt free from falls so far this shift. Will continue to monitor.        Problem: Pressure Ulcer Risk (Otis Scale) (Adult,Obstetrics,Pediatric)  Goal: Identify Related Risk Factors and Signs and Symptoms  Related risk factors and signs and symptoms are identified upon initiation of Human Response Clinical Practice Guideline (CPG)   Outcome: Ongoing (interventions implemented as appropriate)  Pt turned q2h. Pt free from skin breakdown so far this shift. Will continue to monitor.         Problem: Pain, Acute (Adult)  Goal: Identify Related Risk Factors and Signs and Symptoms  Related risk factors and signs and symptoms are identified upon initiation of Human Response Clinical Practice Guideline (CPG)   Outcome: Ongoing (interventions implemented as appropriate)  Frequent rounds made to insure pain control. Pain medication administered previously in shift. No complaints of pain or discomfort at this time. Pain adequately controlled. Will continue to monitor.

## 2017-02-26 NOTE — PROGRESS NOTES
Physical Therapy   Treatment    Rah Puente   MRN: [id number]   PTA visit #: 1     02/25/17 0914 02/25/17 1542   PT Time Calculation   PT Start Time 0914  (late start 2* pt eating breakfast at 8:59 am ) 1542   PT Stop Time 0945 1610   PT Total Time (min) 31 min 28 min   Treatment   Treatment Type Treatment Treatment   PT/PTA PTA PTA   PTA Visit Number 1 1   General   PT Received On 02/25/17 02/25/17   Family/Caregiver Present Yes  (sister) --    Patient Found (position) Seated in wheelchair  (in room) --    Pt found with cervical collar  ([place]) --    Precautions   General Precautions diabetic;fall --    Cervical Brace [place] --    Visual/Auditory Vision impaired  (pt wears glasses) --    Subjective   Patient states Pt agreeable to PT session --    Pain/Comfort   Pain Rating no pain --    Location - Side Bilateral --    Location - Orientation generalized --    Location calf --    Pain Addressed Distraction;Reposition;Cessation of Activity --    Pain Rating Post-Intervention other (see comments)  (no rating provided) --    Bed Mobility   Rolling/Turning Right --  Contact guard assistance  (with vc's for sequencing and technique of functional mobility on mat)   Supine to Sit --  Minimum Assistance;Moderate Assistance  (for trunk elevation from R side lying )   Supine to Sit Comments --  on mat   Sit to Supine --  Minimum Assistance  (on mat with vc's for sequencing, technique and safety)   Sit to Stand   Sit <> Stand Assistance Moderate Assistance --    Sit <> Stand Assistive Device (// bars and side rail with bed raised) --    Trials/Comments 2 trials in //bars and 3 trials with side rail in room --    Stand to Sit   Assistance Moderate Assistance;Minimum Assistance --    Assistive Device (//bars and side rail with bed raised in room) --    Trials/Comments to w/c with vc's for hand placement and technique --    Chair to Mat   Chair<> Mat Technique --  Squat Pivot  (to the R)   Chair<>Mat Assistance --  Moderate  Assistance;Minimum Assistance  (with vc's for hand placement and safety)   Chair <> Mat Assistive Device --  No Assistive Device   Mat to Chair   Technique --  Squat Pivot  (to the L)   Assistance --  Moderate Assistance;Minimum Assistance  (with vc's for hand placement and safety)   Assistive Device --  No Assistive Device   Wheelchair Activities   Propulsion Type 1 --  Manual   Level 1 --  Level tile  (carpet)   Method 1 --  Right upper extremity;Left upper extremity;Right lower extremity;Left lower extremity   Level of Assistance 1 --  Minimum assistance   Description/ Details 1 --  40ft with vc's for technique and encouragement   Static Standing Balance   Static Standing-Balance Support Right upper extremity supported;Left upper extremity supported --    Static Standing-Level of Assistance Moderate assistance;Minimum assistance  (mod/min A in //bars and min A with side rail with bed raised) --    Static Standing-Comment/# of Minutes 42sec and then 51sec in //bars and 1-2 min at a time x2 trials with side rail with bed raised  (Pt using urinal and requiring diaper doffing/donning ) --    Supine   Supine-Exercises --  Lower extremity;Specific exercises   Supine-Exercise Type --  Ankle pumps;Glut sets;Short arc quads;ABD/ADD;Heel slides   Supine-Exercise Comments --  B LE's x15 reps    Activity Tolerance   Activity Tolerance Patient limited by fatigue;Patient limited by pain Patient limited by fatigue;Patient limited by pain   Other Comments   Comments PTA noticed fresh blood drops on room floor and blood on dorsum of lateral aspect of feet.  RN (Nic) was notified Pt educated on the importance and benefits of performing ther ex's and mobility   After Treatment   Patient Position After Treatment Seated in wheelchair  (in room) Seated in wheelchair  (in care of sister)   Patient after treatment left call button in reach;nursing notified  (family present) --    Assessment   Prognosis Fair --    Problem List  Decreased strength;Decreased endurance;Impaired balance;Decreased mobility;Decreased cognition;Impaired judgement;Decreased safety awareness;Pain --    Assessment Pt requires significant assistance for transfers and static standing 2* weakness and pain (B calves) during standing.  Pt will cont to benefit from skilled PT intervention to address deficits and improve functional mobility.  -- Pt agreeable throughout session with family present   Level of Motivation/Participation fair --    Barriers to Discharge Inaccessible home environment;Decreased caregiver support --    Barriers to Discharge Comments 5 JULIA and lives along --    Discharge Recommendations   Equipment Needed After Discharge wheelchair --    Discharge Facility/Level Of Care Needs home health PT --    Plan   Planned Therapy Intervention --  Continue with current plan   Therapy Frequency --  2 times/day;Monday-Friday;Saturday or Sunday   Physical Therapy Follow-up   PT Follow-up? --  Yes   Treatment/Billable Minutes   Therapeutic Activity 31 13   Therapeutic Exercise --  15   Total Time 31 28         Therese Don, PTA  2/25/2017

## 2017-02-26 NOTE — PROGRESS NOTES
Pt without new c/o.    Interval hx:  I have reviewed the pt's HPI and PFSH and it is unchanged from admission.     VSS - Temp:  [98.6 °F (37 °C)-98.8 °F (37.1 °C)] 98.6 °F (37 °C)  Pulse:  [82-87] 85  Resp:  [18] 18  SpO2:  [97 %] 97 %  BP: ()/(50-59) 110/58    Review of Systems   Eyes: Negative for blurred vision.   Respiratory: Negative for cough.   Cardiovascular: Negative for chest pain.   Gastrointestinal: Negative for nausea and vomiting.   Genitourinary: Negative for dysuria.   Musculoskeletal: Positive for back pain and neck pain. Negative for myalgias.   Skin: Negative.   Neurological: Positive for tingling and weakness. Negative for dizziness, sensory change and headaches.   Psychiatric/Behavioral: Negative for depression. The patient has insomnia.      Physical Exam   Constitutional: He is oriented to person, place, and time. He appears well-nourished.   Eyes: EOM are normal. Pupils are equal, round, and reactive to light.   Neck: Neck supple.   Cardiovascular: Normal rate.   Pulmonary/Chest: Effort normal.   Abdominal: Soft.   Musculoskeletal:   BUEs AROM WNL  LLE AROM WNL  RLE with diminished HF   Neurological: He is oriented to person, place, and time.   LUE 4-/5 except 3+/5 EE  RUE 4/5 except 4-/5 EE  LLE 4/5  RLE 3+/5 HF/HE, 4-/5 KF/KE/DF/PF   Skin: No rash noted.   Psychiatric: He has a normal mood and affect.     Assessment:    1. S/P C4-7 fusion with residual cervical myelopathy -- unchanged.     Per PT/OT, patient is MDA/MXA, Sit-->stand, T/Fs MDA/MXA (improved), 13' MNA/MDA RW.  UBD MXA, LBD DEP, toileting DEP on 2/18.    -- moderately confused -- has been since admission.  Oxycontin d/c'd with some improvement but confusion waxes/wanes.    This appears to be delirium.  MRI of brain did not show parenchymal mets.    Oxycodone changed to hydromorphone for pain which is signficant -- he c/o pain in all therapy sessions.     Insomnia seems improved with Ambien.     2. DM2 -- monitor on  dexamethasone.    Reduced basal insulin from 18u to 14u 2/21 and prandial insulin from 6u to 4u 2/20 with some improvement but now low again -- will reduce basal insulin to 12u.    Poor PO food intake.  3. HTN -- controlled  4. Neuropathic pain -- numbness/tingling of fingers -- on gabapentin  5. CKD -- acute exacerbation now improved (CRT 1.9 on 2/20, 1.3 on 2/23) -- encouraging fluids.    6. Chronic hyponatremia -- stable (130 on 2/23).

## 2017-02-26 NOTE — PLAN OF CARE
Problem: Patient Care Overview  Goal: Plan of Care Review  Outcome: Ongoing (interventions implemented as appropriate)  Mr Puente received 10 mg of oxycodone ir for c/o shoulder pain rated at 6 on a pain scale of 0-10. Pt remains AAO; he is up in w/c with cervical collar in progress. Family members escorted patient to front lobby so he could look out of the window. Will continue with plan of care.

## 2017-02-27 ENCOUNTER — TELEPHONE (OUTPATIENT)
Dept: SPINE | Facility: CLINIC | Age: 62
End: 2017-02-27

## 2017-02-27 ENCOUNTER — TELEPHONE (OUTPATIENT)
Dept: HEMATOLOGY/ONCOLOGY | Facility: CLINIC | Age: 62
End: 2017-02-27

## 2017-02-27 LAB
ANION GAP SERPL CALC-SCNC: 7 MMOL/L
BASOPHILS # BLD AUTO: 0 K/UL
BASOPHILS NFR BLD: 0 %
BUN SERPL-MCNC: 40 MG/DL
CALCIUM SERPL-MCNC: 7.9 MG/DL
CHLORIDE SERPL-SCNC: 95 MMOL/L
CO2 SERPL-SCNC: 27 MMOL/L
CREAT SERPL-MCNC: 1.6 MG/DL
DIFFERENTIAL METHOD: ABNORMAL
EOSINOPHIL # BLD AUTO: 0.1 K/UL
EOSINOPHIL NFR BLD: 1.6 %
ERYTHROCYTE [DISTWIDTH] IN BLOOD BY AUTOMATED COUNT: 13.5 %
EST. GFR  (AFRICAN AMERICAN): 52.2 ML/MIN/1.73 M^2
EST. GFR  (NON AFRICAN AMERICAN): 45.2 ML/MIN/1.73 M^2
GLUCOSE SERPL-MCNC: 97 MG/DL
HCT VFR BLD AUTO: 25.3 %
HGB BLD-MCNC: 8 G/DL
LYMPHOCYTES # BLD AUTO: 0.7 K/UL
LYMPHOCYTES NFR BLD: 14.4 %
MCH RBC QN AUTO: 28.2 PG
MCHC RBC AUTO-ENTMCNC: 31.6 %
MCV RBC AUTO: 89 FL
MONOCYTES # BLD AUTO: 0.4 K/UL
MONOCYTES NFR BLD: 7.7 %
NEUTROPHILS # BLD AUTO: 3.8 K/UL
NEUTROPHILS NFR BLD: 76.3 %
PLATELET # BLD AUTO: 129 K/UL
PMV BLD AUTO: 11.5 FL
POCT GLUCOSE: 111 MG/DL (ref 70–110)
POCT GLUCOSE: 129 MG/DL (ref 70–110)
POCT GLUCOSE: 83 MG/DL (ref 70–110)
POCT GLUCOSE: 96 MG/DL (ref 70–110)
POTASSIUM SERPL-SCNC: 4.2 MMOL/L
RBC # BLD AUTO: 2.84 M/UL
SODIUM SERPL-SCNC: 129 MMOL/L
WBC # BLD AUTO: 4.94 K/UL

## 2017-02-27 PROCEDURE — 99233 SBSQ HOSP IP/OBS HIGH 50: CPT | Mod: ,,, | Performed by: PHYSICAL MEDICINE & REHABILITATION

## 2017-02-27 PROCEDURE — 12800000 HC REHAB SEMI-PRIVATE ROOM

## 2017-02-27 PROCEDURE — 97802 MEDICAL NUTRITION INDIV IN: CPT

## 2017-02-27 PROCEDURE — 97530 THERAPEUTIC ACTIVITIES: CPT

## 2017-02-27 PROCEDURE — 97542 WHEELCHAIR MNGMENT TRAINING: CPT

## 2017-02-27 PROCEDURE — 92507 TX SP LANG VOICE COMM INDIV: CPT

## 2017-02-27 PROCEDURE — 97535 SELF CARE MNGMENT TRAINING: CPT

## 2017-02-27 PROCEDURE — 25000003 PHARM REV CODE 250: Performed by: ORTHOPAEDIC SURGERY

## 2017-02-27 PROCEDURE — 97150 GROUP THERAPEUTIC PROCEDURES: CPT

## 2017-02-27 PROCEDURE — 25000003 PHARM REV CODE 250: Performed by: PHYSICAL MEDICINE & REHABILITATION

## 2017-02-27 PROCEDURE — 85025 COMPLETE CBC W/AUTO DIFF WBC: CPT

## 2017-02-27 PROCEDURE — 80048 BASIC METABOLIC PNL TOTAL CA: CPT

## 2017-02-27 PROCEDURE — 36415 COLL VENOUS BLD VENIPUNCTURE: CPT

## 2017-02-27 PROCEDURE — 97110 THERAPEUTIC EXERCISES: CPT

## 2017-02-27 RX ADMIN — HEPARIN SODIUM 5000 UNITS: 5000 INJECTION, SOLUTION INTRAVENOUS; SUBCUTANEOUS at 01:02

## 2017-02-27 RX ADMIN — AMLODIPINE BESYLATE 10 MG: 10 TABLET ORAL at 08:02

## 2017-02-27 RX ADMIN — HEPARIN SODIUM 5000 UNITS: 5000 INJECTION, SOLUTION INTRAVENOUS; SUBCUTANEOUS at 09:02

## 2017-02-27 RX ADMIN — ZOLPIDEM TARTRATE 5 MG: 5 TABLET ORAL at 09:02

## 2017-02-27 RX ADMIN — GABAPENTIN 300 MG: 300 CAPSULE ORAL at 05:02

## 2017-02-27 RX ADMIN — GABAPENTIN 300 MG: 300 CAPSULE ORAL at 09:02

## 2017-02-27 RX ADMIN — FAMOTIDINE 20 MG: 20 TABLET, FILM COATED ORAL at 08:02

## 2017-02-27 RX ADMIN — CARVEDILOL 25 MG: 25 TABLET, FILM COATED ORAL at 08:02

## 2017-02-27 RX ADMIN — CARVEDILOL 25 MG: 25 TABLET, FILM COATED ORAL at 07:02

## 2017-02-27 RX ADMIN — GABAPENTIN 300 MG: 300 CAPSULE ORAL at 01:02

## 2017-02-27 RX ADMIN — OXYCODONE HYDROCHLORIDE 10 MG: 5 TABLET ORAL at 08:02

## 2017-02-27 RX ADMIN — OXYCODONE HYDROCHLORIDE 10 MG: 5 TABLET ORAL at 07:02

## 2017-02-27 RX ADMIN — QUINAPRIL 20 MG: 20 TABLET ORAL at 08:02

## 2017-02-27 RX ADMIN — INSULIN ASPART 4 UNITS: 100 INJECTION, SOLUTION INTRAVENOUS; SUBCUTANEOUS at 05:02

## 2017-02-27 RX ADMIN — FAMOTIDINE 20 MG: 20 TABLET, FILM COATED ORAL at 07:02

## 2017-02-27 RX ADMIN — HEPARIN SODIUM 5000 UNITS: 5000 INJECTION, SOLUTION INTRAVENOUS; SUBCUTANEOUS at 05:02

## 2017-02-27 RX ADMIN — INSULIN ASPART 4 UNITS: 100 INJECTION, SOLUTION INTRAVENOUS; SUBCUTANEOUS at 08:02

## 2017-02-27 RX ADMIN — TAMSULOSIN HYDROCHLORIDE 0.4 MG: 0.4 CAPSULE ORAL at 08:02

## 2017-02-27 RX ADMIN — TAMSULOSIN HYDROCHLORIDE 0.4 MG: 0.4 CAPSULE ORAL at 07:02

## 2017-02-27 RX ADMIN — HYDROMORPHONE HYDROCHLORIDE 6 MG: 2 TABLET ORAL at 01:02

## 2017-02-27 NOTE — PROGRESS NOTES
Pt without new c/o.    Interval hx:  I have reviewed the pt's HPI and PFSH and it is unchanged from admission.     VSS - Temp:  [98 °F (36.7 °C)] 98 °F (36.7 °C)  Pulse:  [91] 91  Resp:  [18] 18  SpO2:  [96 %] 96 %  BP: (106)/(55) 106/55    Review of Systems   Eyes: Negative for blurred vision.   Respiratory: Negative for cough.   Cardiovascular: Negative for chest pain.   Gastrointestinal: Negative for nausea and vomiting.   Genitourinary: Negative for dysuria.   Musculoskeletal: Positive for back pain and neck pain. Negative for myalgias.   Skin: Negative.   Neurological: Positive for tingling and weakness. Negative for dizziness, sensory change and headaches.   Psychiatric/Behavioral: Negative for depression. The patient has insomnia.      Physical Exam   Constitutional: He is oriented to person, place, and time. He appears well-nourished.   Eyes: EOM are normal. Pupils are equal, round, and reactive to light.   Neck: Neck supple.   Cardiovascular: Normal rate.   Pulmonary/Chest: Effort normal.   Abdominal: Soft.   Musculoskeletal:   BUEs AROM WNL  LLE AROM WNL  RLE with diminished HF   Neurological: He is oriented to person, place, and time.   LUE 4-/5 except 3+/5 EE  RUE 4/5 except 4-/5 EE  LLE 4/5  RLE 3+/5 HF/HE, 4-/5 KF/KE/DF/PF   Skin: No rash noted.   Psychiatric: He has a normal mood and affect.     Assessment:    1. S/P C4-7 fusion with residual cervical myelopathy -- unchanged.   Sit-->stand MDA, T/Fs MDA (improved), 13' MNA/MDA RW.  UBD MDA (improved), LBD DEP, toileting DEP on 2/27.  Refusing some therapy activities.    -- moderately confused -- has been since admission.  Oxycontin d/c'd with some improvement but confusion waxes/wanes.  This appears to be delirium.  MRI of brain did not show parenchymal mets.  I will change oxycodone to hydromorphone for pain which is signficant -- he c/o pain in all therapy sessions.   Insomnia seems improved with Ambien.  CBC WNL.  -- cognitive deficits --  "improving.  In the most recent ST session:  "Patient provided responses to social interaction situations with 80% accy (significantly increased from previous session).  Patient completed sentence completion task with 100% accy, given min repetitions per patient request. Patient completed open-ended sentence completion task with 90% accy. Patient named objects, given a description, with 70% accy."  2. DM2 -- monitor on dexamethasone.  Reduced basal insulin from 18u to 14u 2/21 and prandial insulin from 6u to 4u 2/20 with some improvement but now low again -- will reduce basal insulin to 10u.  Cont to monitor.  Will also ask for supervised meals as pt is eating very little.    3. HTN -- controlled  4. Neuropathic pain -- numbness/tingling of fingers -- on gabapentin  5. CKD -- acute exacerbation now improved (CRT 1.9 on 2/20, 1.3 on 2/23) -- encouraging fluids.    6. Chronic hyponatremia -- stable (130 on 2/23).       Future Appointments  Date Time Provider Department Center   3/7/2017 11:00 AM Franklyn Ng MD [facility] SPINE [address]   3/8/2017 10:30 AM LAB, HEMONC CANCER [facility] LAB [facility]   3/8/2017 11:30 AM Bill Goetz MD [facility] HEM ONC [facility]             "

## 2017-02-27 NOTE — PROGRESS NOTES
"Speech Therapy   Treatment    Rah Puente   MRN: [id number]        02/27/17 1115   Speech Time Calculation   Speech Start Time 1115   Speech Stop Time 1200   Speech Total (min) 45 min   General Information   SLP Treatment Date 02/27/17   General Observations Pt seen sitting up in bed - alert and motivated. Pt noted with improved social interaction compared to previous sessions.    General Precautions diabetic;fall   Visual/Auditory Vision impaired   Subjective   Patient states When asked how pt was feeling, pt responded, " depressed and annoyed with why I am here, but I'm having a good day".    Pain/Comfort   Pain Rating no pain       SLP Cognitive Treatment   Treatment Detail (SLP Cognitive Treatment) Slight improvement with social interaction noted. Oriented x4 independently. Pt recalled recent events (i.e. living situation, parades in [place] this past weekend) with no difficulty - pt needed mod verbal A with recall of recent PT/OT session and current events. Pt ed re reasoning of orientation questions in efforts to improve confused state - pt voiced appreciation and understanding with explanation.        SLP Treatment: Verbal Expression   Treatment Detail (SLP Verbal Expression) Pt completed one-word open ended sentence completion task with nouns with 90% accy independently, 100% with cues to assist with word finding.        SLP Treatment: Auditory Comprehension   Treatment Detail (SLP Auditory Comprehension) Pt provided name of object when given verbal description to improve understanding and thought organization - pt completed task with 90% accy independently, 100% with semantic cues to assist with understanding.    Discharge Recommendations   Discharge Facility/Level Of Care Needs home with home health   Plan   Plan Continue with current plan   Therapy Frequency Monday-Friday   SLP Follow-up   SLP Follow-up? Yes   Treatment/Billable Minutes   Speech Therapy Individual 45   Total Time 45     FIM " Scores  Comprehension:3  Expression: 3  Social Interaction:2  Problem Solvin  Memory: 1      Comprehension:   Complex= humor, finances, rationale for medical treatment(hip precautions, pressure relief)  Basic= pain, hunger, thirst, bathroom needs, cold, nutrition, sleep      Understands basic 50-74%- May need parts of sentences repeated (3)      Expression:  Expresses basic 50-74% of time - Needs to repeat parts of sentences (3)      Social Interaction:  Interacts appropriately 25-49% of time - Needs frequent redirection (2)      Problem Solving:  Solves basic problems less than 25% of time - Needs direction nearly all the time or does not effectively solve problems and my need a restraint for safety. Bed alarm on at all times. (1)      Memory:  Recognizes, recalls, or executes 1 step request less than 25% of time (1)     AKILAH Lopez-SLP  2017

## 2017-02-27 NOTE — PROGRESS NOTES
Pt without new c/o.      Interval hx:   Patient reports having weakness mainly in upper part of both arms, cannot lift shoulders, but can bring his hand to mouth, and feeds himself.  Asking when and how he will return back to work.   His family at bedside interested in his date of d/c, and bringing him to [place].     I have reviewed the pt's HPI and PFSH and it is unchanged from admission.     VSS - Temp:  [98.1 °F (36.7 °C)-98.6 °F (37 °C)] 98.1 °F (36.7 °C)  Pulse:  [81-85] 81  Resp:  [18] 18  SpO2:  [97 %] 97 %  BP: (110-122)/(58-67) 122/67    Review of Systems   Eyes: Negative for blurred vision.   Respiratory: Negative for cough.   Cardiovascular: Negative for chest pain.   Gastrointestinal: Negative for nausea and vomiting.   Genitourinary: Negative for dysuria.   Musculoskeletal: Positive for neck pain.  Negative for myalgias.   Skin: Negative.   Neurological: Positive for tingling and weakness. Negative for dizziness, sensory change and headaches.   Psychiatric/Behavioral: Negative for depression.   The patient has insomnia.     Physical Exam   Constitutional: He is oriented to person, place, and time. He appears well-nourished.   Eyes: EOM are normal. Pupils are equal, round, and reactive to light.   Neck: Neck supple.   Cardiovascular: Normal rate.   Pulmonary/Chest: Effort normal.   Abdominal: Soft.   Musculoskeletal:   BUEs AROM WNL  LLE AROM WNL  RLE with diminished HF   Neurological: He is oriented to person, place, and time.   LUE 4-/5 except 3+/5 EE  RUE 4/5 except 4-/5 EE  LLE 4/5  RLE 3+/5 HF/HE, 4-/5 KF/KE/DF/PF   Skin: No rash noted.   Psychiatric: He has a normal mood and affect.     Assessment:    1. S/P C4-7 fusion with residual cervical myelopathy -- unchanged.     Per PT/OT, patient is MDA/MXA, Sit-->stand, T/Fs MDA/MXA (improved), 13' MNA/MDA RW.  UBD MXA, LBD DEP, toileting DEP on 2/18.    -- moderately confused -- has been since admission.  Oxycontin d/c'd with some improvement but confusion  waxes/wanes.    This appears to be delirium.  MRI of brain did not show parenchymal mets.    Oxycodone changed to hydromorphone for pain which is signficant -- he c/o pain in all therapy sessions.     Insomnia seems improved with Ambien.     2. DM2 -- monitor on dexamethasone.    Reduced basal insulin from 18u to 14u 2/21 and prandial insulin from 6u to 4u 2/20 with some improvement but now low again -- will reduce basal insulin to 12u.    Poor PO food intake.  3. HTN -- controlled  4. Neuropathic pain -- numbness/tingling of fingers -- on gabapentin  5. CKD -- acute exacerbation now improved (CRT 1.9 on 2/20, 1.3 on 2/23) -- encouraging fluids.    6. Chronic hyponatremia -- stable (130 on 2/23).

## 2017-02-27 NOTE — TELEPHONE ENCOUNTER
----- Message from Ailyn Sanon sent at 2/27/2017 11:42 AM CST -----  Contact: pt sister ( Yosef Ellis)  pt sister ( Yosef Ellis) is calling to speak with nurse in regards to scheduling a meeting with oncology team to discuss pt care, sister states she will be coming from out of town, sister states nothing has been discuss about pt cancer treatment, sister mention scheduling with doctors on Thursday pr Friday.  Contact number [phone]

## 2017-02-27 NOTE — PROGRESS NOTES
Physical Therapy   Daily FIM    Rah Puente   MRN: [id number]      02/27/17 0940   Transfers   Bed/Chair/WC 4   Locomotion   Distance Walked 1   Distance Wheelchair 3   Walk 1   Wheelchair 5   Mode C   Stairs 0       Sherri Sherman, PTA  2/27/2017

## 2017-02-27 NOTE — CONSULTS
[facility]  Adult Nutrition  Consult Note    SUMMARY     Recommendations    Recommendation/Intervention: 1) Continue diabetic, 2000 calorie diet 2) Continue Boost Glucose Control, bid, chocolate 3) Informed pt of meal options to obtain his preferences 4) recommend updating pt wt 5) Recommend 100-200 mg vitamin c and 15 mg elemental zinc twice daily for 10-14 days per nutrition guidelines for wound care  Goals: 1) Meal intake at least 75% 2) Utilize Boost Glucose Control  Nutrition Goal Status: new  Communication of RD Recs:  (sticky note, reviewed with diet office)    1. Cervical myelopathy    2. Upper extremity weakness    3. Metastatic cancer to bone    4. Cervical radiculopathy    5. Cellulitis and abscess of leg    6. Hyponatremia    7. Alcohol abuse    8. Hematoma    9. Cellulitis of foot, left    10. Trauma    11. Intussusception of colon    12. Villous adenoma of right colon    13. History of colon cancer, stage I    14. Essential hypertension    15. 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  Has a variable appetite and prefers chocolate Boost. Does not care for strawberry.    Nutrition Prescription Ordered    Current Diet Order: Diabetic 2000    Oral Nutrition Supplement: Boost Glucose Control, BID, chocolate     Evaluation of Received Nutrients/Fluid Intake      Energy Calories Required: not meeting needs     Comments: Meal intake for last 3 documented meals: 50%, 100%, 50% and pt is not using Boost Glucose Control now  Tolerance: tolerating     Nutrition Risk Screen     Nutrition Risk Screen: no indicators present    Nutrition/Diet History    Patient Reported Diet/Restrictions/Preferences: general   Food Preferences: doesn't care for Boost Glucose Control, but willing to try chocolate.     Supplemental Drinks or Food Habits: Boost Plus      Labs/Tests/Procedures/Meds    Diagnostic Test/Procedure Review: reviewed, pertinent  Pertinent Labs Reviewed: reviewed, pertinent        Recent Labs  Lab 02/27/17  1112   POCTGLUCOSE 96     Lab Results   Component Value Date    HGBA1C 6.5 (H) 02/09/2017     Pertinent Medications Reviewed: reviewed, pertinent      Scheduled Meds:   amlodipine  10 mg Oral Daily    carvedilol  25 mg Oral BID    famotidine  20 mg Oral BID    gabapentin  300 mg Oral TID    heparin (porcine)  5,000 Units Subcutaneous Q8H    insulin aspart  4 Units Subcutaneous TIDWM    [START ON 2/28/2017] insulin detemir  10 Units Subcutaneous Daily    quinapril  20 mg Oral Daily    senna-docusate 8.6-50 mg  1 tablet Oral Daily    tamsulosin  0.4 mg Oral BID    zolpidem  5 mg Oral QHS     Continuous Infusions:   PRN Meds:.acetaminophen, aluminum-magnesium hydroxide-simethicone, bisacodyl, dextrose 50%, dextrose 50%, dextrose 50%, diphenoxylate-atropine 2.5-0.025 mg, docusate sodium, glucagon (human recombinant), glucose, glucose, HYDROmorphone,  HYDROmorphone, insulin aspart, magnesium hydroxide 400 mg/5 ml, methocarbamol, ondansetron, oxycodone, polyethylene glycol, sodium phosphates      Physical Findings    Overall Physical Appearance: loss of muscle mass   Oral/Mouth Cavity: plaque present  Skin:  (open blister under collar; Otis Score 20)    Anthropometrics  Height (inches): 74.02 in   Weight (kg): 77.2 kg  Ideal Body Weight (IBW), Male: 190.12 lb   % Ideal Body Weight, Male (lb): 89.52 lb   BMI (kg/m2): 21.84  BMI Grade: 18.5-24.9 - normal  Usual Body Weight (UBW), k.2 kg  % Usual Body Weight: 100     Anthropometrics (Special Considerations)       Estimated/Assessed Needs    Weight Used For Calorie Calculations: 77.2 kg (170 lb 3.1 oz)   Height (cm): 188 cm   Energy Need Method: [place] (0383-3789 including AF 1.2-1.3)    RMR ([place] Jeor Equation): 1641.27   Weight Used For Protein Calculations: 77.2 kg (170 lb 3.1 oz) ( gm/day (1.2-1.5 gm/kg/day per wound care recs)   Fluid Need Method:  (1 ml/kcal or per MD rec)   Electrolyte/Mineral Requirements: Zinc  Vitamin Requirements: Vitamin C       Monitor and Evaluation    Food and Nutrient Intake: energy intake  Food and Nutrient Adminstration: diet order   Anthropometric Measurements: weight change, weight  Biochemical Data, Medical Tests and Procedures: glucose/endocrine profile  Nutrition-Focused Physical Findings: overall appearance, skin     Nutrition Diagnosis    Increased nutrient needs r/t healing needs AEB presence of open blister under collar per chart notes    Nutrition Risk    Level of Risk:  (1 x weekly)    Nutrition Follow-Up    RD Follow-up?: Yes    Assessment and Plan    No new Assessment & Plan notes have been filed under this hospital service since the last note was generated.  Service: Nutrition

## 2017-02-27 NOTE — TELEPHONE ENCOUNTER
Spoke with Patients sister regarding message. Please give her a call April regarding an appointment to discuss plan of action with her brother who is in rehab    ----- Message from Mallory Selby sent at 2/27/2017 12:16 PM CST -----  _x  1st Request  _  2nd Request  _  3rd Request        Who: Shital Correia(sister/Power of )    Why: Pt's sister would like to set up and appt with Dr. Ng to discuss his plan of action for her brother who is in rehab. She will be in [place] on 3/2-3/3. Please call to discuss.    What Number to Call Back:[phone]    When to Expect a call back: (Before the end of the day)   -- if the call is after 12:00, the call back will be tomorrow.

## 2017-02-27 NOTE — TELEPHONE ENCOUNTER
"Returned call to patient's sister, eric culver, at [phone]--who is calling to schedule an appointment this Thursday or Friday with dr tena and the complete oncology team, as she and her sister are flying in from [place] and [place]. The family wants a family meeting (patient will not be present, eric has POA) to discuss patient's plan of care, as "nothing has been told to us yet."  Patient is in rehab-and should be DC'd this weekend.     Message routed to dr tena  "

## 2017-02-27 NOTE — PROGRESS NOTES
Physical Therapy   Treatment    Rah Puente   MRN: [id number]   PTA visit #: 2   02/27/17 0940   PT Time Calculation   PT Start Time 0940   PT Stop Time 1025   PT Total Time (min) 45 min   Treatment   Treatment Type Treatment   PT/PTA PTA   PTA Visit Number 2   General   PT Received On 02/27/17   Family/Caregiver Present No   Patient Found (position) Supine in bed   Pt found with cervical collar   Precautions   General Precautions diabetic;fall   Required Braces or Orthoses Yes   Cervical Brace [place]   Visual/Auditory Vision impaired   Subjective   Patient states Pt agreeable with encouragement   Pain/Comfort   Pain Rating no pain   Bed Mobility   Bed Mobility yes   Supine to Sit Minimum Assistance   Supine to Sit Comments bed   Sit to Supine Minimum Assistance   Transfers   Transfer yes   Sit to Stand   Sit <> Stand Assistance Minimum Assistance   Trials/Comments x 3 trials // bars   Stand to Sit   Assistance Minimum Assistance   Bed to Chair   Bed <> Chair Technique Squat Pivot   Bed <> Chair Assistance Moderate Assistance   Bed <> Chair Assistive Device No Assistive Device   Chair to Bed   Technique Squat Pivot   Assistance Moderate Assistance   Assistive Device No Assistive Device   Wheelchair Activities   Propulsion Yes   Propulsion Type 1 Manual   Level 1 Level tile   Method 1 Right upper extremity;Left upper extremity;Right lower extremity;Left lower extremity   Level of Assistance 1 Stand by assistsance   Description/ Details 1 150', vc   Gait   Gait Yes   Weight Bearing Status FWB   Gait 1   Surface 1 Level tile   Gait Assistive Device Parallel bars   Other Apparatus 1 Wheelchair follow   Assistance 1 Moderate assistance   Gait Distance 3 steps, then pt ref to amb any further and yelling for the wc   Gait Pattern swing-to gait   Gait Deviation(s) decreased davion;increased time in double stance;decreased velocity of limb motion;decreased step length;decreased stride length;increased stride  width;decreased swing-to-stance ratio;decreased toe-to-floor clearance;decreased weight-shifting ability   Impairments Contributing to Gait Deviations impaired balance;impaired coordination;impaired motor control;impaired postural control;decreased strength   Stairs   Stairs No   Static Standing Balance   Static Standing-Balance Support Right upper extremity supported;Left upper extremity supported   Static Standing-Level of Assistance Minimum assistance   Static Standing-Comment/# of Minutes 40 sec x 2 reps   Seated   Seated-Exercises Lower extremity;Specific exercises   Seated-Exercise Type Ankle pumps;Hip flexion;Long arc quads;ABduction;ADduction   Seated-Exercise Comments b le 2 X 15 reps   Activity Tolerance   Activity Tolerance Patient tolerated treatment well   After Treatment   Patient Position After Treatment Supine in bed   Patient after treatment left call button in reach;with bed alarm on   Assessment   Prognosis Fair   Problem List Decreased strength;Decreased endurance;Impaired balance;Decreased mobility;Decreased coordination;Decreased cognition;Impaired judgement;Decreased safety awareness   Assessment Pt ayde tx fair but continues to req several rest breaks during session and self limiting.  Cont POC   Level of Motivation/Participation fair   Barriers to Discharge Inaccessible home environment;Decreased caregiver support   Discharge Recommendations   Equipment Needed After Discharge wheelchair   Discharge Facility/Level Of Care Needs home health PT   Plan   Planned Therapy Intervention Continue with current plan   Therapy Frequency 2 times/day;Monday-Friday;Saturday or Sunday   Physical Therapy Follow-up   PT Follow-up? Yes   Treatment/Billable Minutes   Therapeutic Activity 15   Therapeutic Exercise 15   Train/Wheelchair Management 15   Total Time 45       Sherri Sherman, PTA  2/27/2017

## 2017-02-27 NOTE — PROGRESS NOTES
Occupational Therapy   Dressing Group    Rah Puente   MRN: [id number]     Pt participated in 45 min functional dressing group. The group activity reviewed safety considerations, energy conservation, and adaptive strategies for upper body and lower body dressing. Patient educated on adaptive equipment available to assist with dressing (button hook, long handled shoe horn, reacher, dressing stick, elastic shoe laces). Patient also educated on dressing tips that promote increased (I) with dressing such as wearing loose fit clothing, using footstool, and adapted clothing available.     UB dressing (pullover shirt, button down shirt, ):  Max Assist    LB dressing (socks)  SBA Assist with AE                      (pants)   Pt refused to attempt                     These activities were performed in a group setting to encourage participation with peers and social interaction skills. Pt needing max encouragement at times during group to perform activities. Pt responsive to education on AE, also gave tips to other patients for AE use at home.        02/27/17 1315   OT Time Calculation   OT Start Time 1315   OT Stop Time 1400   OT Total Time (min) 45 min   General   OT Date of Treatment 02/27/17   Pt found with cervical collar   Precautions   General Precautions diabetic;fall   Cervical Brace [place]   Plan   Plan Continue with current plan   Occupational Therapy Follow-up   OT Follow-up? Yes   Treatment/Billable Minutes   Therapeutic Group 45   Total Time 45       ZORAIDA Kee   2/27/2017

## 2017-02-27 NOTE — PROGRESS NOTES
"Occupational Therapy  AM Treatment Session  Rah Puente   MRN: [id number]          02/27/17 0730   OT Time Calculation   OT Start Time 0730   OT Stop Time 0815   OT Total Time (min) 45 min   General   OT Date of Treatment 02/27/17   Family/Caregiver Present No   Patient Found (position) Supine in bed  (HOB elevated x4 bedrails bed alarm ON)   Pt found with cervical collar   Precautions   General Precautions diabetic;fall   Required Braces or Orthoses Yes   Cervical Brace [place]   Visual/Auditory Vision impaired   Subjective   Patient states "I just feel like a cripple" re: Pt reported while participating in LBD.   Pain/Comfort   Pain Rating 5/10   Location - Orientation medial   Location back   Pain Addressed Distraction   Bed Mobility   Rolling/Turning to Left Minimum assistance   Rolling/Turning Left Comments cues for LE positioning ; (A) with trunk   Rolling/Turning Right Minimum assistance   Rolling/Turning Right Comments same   Scooting/Bridging Minimum Assistance   Scooting/Bridging Comments to EOB   Supine to Sit Moderate Assistance   Supine to Sit Comments (A) for trunk   Sit to Stand   Sit <> Stand Assistance Maximum Assistance   Sit <> Stand Assistive Device No Assistive Device   Trials/Comments from EOB   Stand to Sit   Assistance Total Assistance   Assistive Device No Assistive Device   Trials/Comments to control descent to EOB   Bed to Chair   Bed <> Chair Technique Squat Pivot   Bed <> Chair Transfer Assistance Maximum Assistance   Bed <> Chair Assistive Device No Assistive Device   Trials/Comments EOB > WC   Feeding   Feeding Level of Assistance Stand by assistance   Feeding Where Assessed Wheelchair   Feeding Comments (A) to cut food and manage small containers   Grooming   Grooming Level of Assistance Stand by assistance   Hand Washing Level of Assistance Stand by assistance   Face Washing Level of Assistance Supervision   Grooming Where Assessed Sitting sinkside   Bathing   Bathing Level of " Assistance Moderate assistance   Bathing Where Assessed Bed   Bathing Comments (A) for (B)LE, Buttocks, (B) thigh   UE Dressing   UE Dressing Level of Assistance Moderate assistance   UE Dressing Where Assessed Edge of bed   UE Dressing Comments (A) for trunk/overhead management   LE Dressing   LE Dressing  Level of Assistance Total assistance   LE Dressing Level of Assistance Total assistance   Sock Level of Assistance Total assistance   Activity Tolerance   Activity Tolerance Patient tolerated treatment well   Medical Staff Made Aware Rn   After Treatment   Patient Position After Treatment Seated in wheelchair   Patient after treatment left call button in reach;nursing notified  (Notified Sherri)   Assessment   Prognosis Poor   Problem List Decreased Self Care skills;Decreased upper extremity range of motion;Decreased upper extremity strength;Decreased safe judgment during ADL;Decreased cognition;Decreased endurance;Decreased fine motor control;Decreased functional mobility;Decreased gross motor control;Decreased IADLs;Decreased Function of right upper extremity;Decreased Function of left upper extremity;Decreased trunk control for functional activities   Assessment Pt awake upon entry and in calm mood Suat bed level with c/o back pain. Pt limited this session by BLE/BUE decreased strength and pain requiring rest breaks. Pt does help somewhat during TFs, however is very unsteady requiring MAX-TOT A for safety. Pt would continue to benefit from continued OT services to increase functional mobility.   Level of Motivation/Participation Good   Barriers to Discharge Home environment accessibility limitations   Discharge Recommendations   Equipment Needed After Discharge wheelchair   Discharge Facility/Level Of Care Needs home with home health   Plan   Plan Continue with current plan   Therapy Frequency 2 times/day;Monday-Friday;Saturday or Sunday   Treatment/Billable Minutes   Self Care/Home Management 45   Total Time  45         The ZORAIDA and JJ have collaborated and discussed the patient's status, treatment plan and progress toward established goals.     Uri Herrera, IRENE 2/27/2017

## 2017-02-27 NOTE — TELEPHONE ENCOUNTER
----- Message from Ailyn Sanon sent at 2/27/2017 12:01 PM CST -----  Contact: pt sister   Pt sister is returning a missed call.  Contact number [phone]

## 2017-02-27 NOTE — TELEPHONE ENCOUNTER
Informed sister about appointment on Thursday at 8am. POA paperwork must be presented and patient must call clinic to give permission for dr tena to speak with his sisters, as he is of sound mind.  Sister voiced understanding.

## 2017-02-27 NOTE — PROGRESS NOTES
Occupational Therapy  Tx FIM  Rah Dickersonpancho        02/27/17 0800   Transfers   Bed/Chair/WC 2   Self Care   Eating 5   Grooming 5   Bathing 3   Dressing-Upper 3   Dressing-Lower 1   Communication   Comprehension 4   Mode B   Expression 3   Mode B   Social Cognition   Social Interaction 5   Problem Solving 2   Memory 2       Uri Herrera, [facility] 2/27/2017

## 2017-02-28 LAB
POCT GLUCOSE: 134 MG/DL (ref 70–110)
POCT GLUCOSE: 142 MG/DL (ref 70–110)
POCT GLUCOSE: 169 MG/DL (ref 70–110)
POCT GLUCOSE: 185 MG/DL (ref 70–110)

## 2017-02-28 PROCEDURE — 99233 SBSQ HOSP IP/OBS HIGH 50: CPT | Mod: ,,, | Performed by: PHYSICAL MEDICINE & REHABILITATION

## 2017-02-28 PROCEDURE — 25000003 PHARM REV CODE 250: Performed by: ORTHOPAEDIC SURGERY

## 2017-02-28 PROCEDURE — 25000003 PHARM REV CODE 250: Performed by: PHYSICAL MEDICINE & REHABILITATION

## 2017-02-28 PROCEDURE — 12800000 HC REHAB SEMI-PRIVATE ROOM

## 2017-02-28 RX ADMIN — CARVEDILOL 25 MG: 25 TABLET, FILM COATED ORAL at 08:02

## 2017-02-28 RX ADMIN — OXYCODONE HYDROCHLORIDE 10 MG: 5 TABLET ORAL at 05:02

## 2017-02-28 RX ADMIN — HEPARIN SODIUM 5000 UNITS: 5000 INJECTION, SOLUTION INTRAVENOUS; SUBCUTANEOUS at 02:02

## 2017-02-28 RX ADMIN — FAMOTIDINE 20 MG: 20 TABLET, FILM COATED ORAL at 09:02

## 2017-02-28 RX ADMIN — CARVEDILOL 25 MG: 25 TABLET, FILM COATED ORAL at 09:02

## 2017-02-28 RX ADMIN — QUINAPRIL 20 MG: 20 TABLET ORAL at 08:02

## 2017-02-28 RX ADMIN — GABAPENTIN 300 MG: 300 CAPSULE ORAL at 09:02

## 2017-02-28 RX ADMIN — FAMOTIDINE 20 MG: 20 TABLET, FILM COATED ORAL at 08:02

## 2017-02-28 RX ADMIN — TAMSULOSIN HYDROCHLORIDE 0.4 MG: 0.4 CAPSULE ORAL at 08:02

## 2017-02-28 RX ADMIN — HEPARIN SODIUM 5000 UNITS: 5000 INJECTION, SOLUTION INTRAVENOUS; SUBCUTANEOUS at 09:02

## 2017-02-28 RX ADMIN — HEPARIN SODIUM 5000 UNITS: 5000 INJECTION, SOLUTION INTRAVENOUS; SUBCUTANEOUS at 05:02

## 2017-02-28 RX ADMIN — INSULIN ASPART 4 UNITS: 100 INJECTION, SOLUTION INTRAVENOUS; SUBCUTANEOUS at 12:02

## 2017-02-28 RX ADMIN — TAMSULOSIN HYDROCHLORIDE 0.4 MG: 0.4 CAPSULE ORAL at 09:02

## 2017-02-28 RX ADMIN — ZOLPIDEM TARTRATE 5 MG: 5 TABLET ORAL at 09:02

## 2017-02-28 RX ADMIN — GABAPENTIN 300 MG: 300 CAPSULE ORAL at 05:02

## 2017-02-28 RX ADMIN — ONDANSETRON 8 MG: 8 TABLET, ORALLY DISINTEGRATING ORAL at 09:02

## 2017-02-28 RX ADMIN — GABAPENTIN 300 MG: 300 CAPSULE ORAL at 02:02

## 2017-02-28 RX ADMIN — AMLODIPINE BESYLATE 10 MG: 10 TABLET ORAL at 08:02

## 2017-02-28 NOTE — PLAN OF CARE
Problem: Patient Care Overview  Goal: Plan of Care Review  Outcome: Ongoing (interventions implemented as appropriate)  Pt care plan updated and individualized as needed and progress continues.  See associated flowsheets for relevant documentation. Frequent rounds made per protocol to maintain pt safety and meet pt needs.  Continuing to monitor and follow up as needed.          Problem: Fall Risk (Adult)  Goal: Absence of Falls  Patient will demonstrate the desired outcomes by discharge/transition of care.   Outcome: Ongoing (interventions implemented as appropriate)  Pt remains free from falls or trauma thus far this shift.        Problem: Pressure Ulcer Risk (Otis Scale) (Adult,Obstetrics,Pediatric)  Goal: Skin Integrity  Patient will demonstrate the desired outcomes by discharge/transition of care.   Outcome: Ongoing (interventions implemented as appropriate)  Pt turned and repositioned as needed to maintain skin integrity.  No new breakdown noted.  Pt requires encouragement and assist with repositioning and pillow supports.    Problem: Pain, Acute (Adult)  Goal: Acceptable Pain Control/Comfort Level  Patient will demonstrate the desired outcomes by discharge/transition of care.   Outcome: Ongoing (interventions implemented as appropriate)  Pt reports pain controlled with meds as ordered.

## 2017-02-28 NOTE — PROGRESS NOTES
Pt without new c/o.    Interval hx:  I have reviewed the pt's HPI and PFSH and it is unchanged from admission.     VSS - Temp:  [98.4 °F (36.9 °C)-98.6 °F (37 °C)] 98.6 °F (37 °C)  Pulse:  [88-89] 89  Resp:  [16-18] 16  SpO2:  [94 %-97 %] 94 %  BP: (103-139)/(58-75) 103/58    Review of Systems   Eyes: Negative for blurred vision.   Respiratory: Negative for cough.   Cardiovascular: Negative for chest pain.   Gastrointestinal: Negative for nausea and vomiting.   Genitourinary: Negative for dysuria.   Musculoskeletal: Positive for back pain and neck pain. Negative for myalgias.   Skin: Negative.   Neurological: Positive for tingling and weakness. Negative for dizziness, sensory change and headaches.   Psychiatric/Behavioral: Negative for depression. The patient has insomnia.      Physical Exam   Constitutional: He is oriented to person, place, and time. He appears well-nourished.   Eyes: EOM are normal. Pupils are equal, round, and reactive to light.   Neck: Neck supple.   Cardiovascular: Normal rate.   Pulmonary/Chest: Effort normal.   Abdominal: Soft.   Musculoskeletal:   BUEs AROM WNL  LLE AROM WNL  RLE with diminished HF   Neurological: He is oriented to person, place, and time.   LUE 4-/5 except 3+/5 EE  RUE 4/5 except 4-/5 EE  LLE 4/5  RLE 3+/5 HF/HE, 4-/5 KF/KE/DF/PF   Skin: No rash noted.   Psychiatric: He has a normal mood and affect.     Assessment:    1. S/P C4-7 fusion with residual cervical myelopathy -- unchanged.   Sit-->stand MDA, T/Fs MDA (improved), 13' MNA/MDA RW.  UBD MDA (improved), LBD DEP, toileting DEP on 2/27.  Refusing some therapy activities.    -- moderately confused -- has been since admission.  Oxycontin d/c'd with some improvement but confusion waxes/wanes.  This appears to be delirium.  MRI of brain did not show parenchymal mets.  I will change oxycodone to hydromorphone for pain which is signficant -- he c/o pain in all therapy sessions.   Insomnia seems improved with Ambien.  CBC  "WNL.  -- cognitive deficits -- improving.  In the most recent ST session:  "Patient provided responses to social interaction situations with 80% accy (significantly increased from previous session).  Patient completed sentence completion task with 100% accy, given min repetitions per patient request. Patient completed open-ended sentence completion task with 90% accy. Patient named objects, given a description, with 70% accy."  2. DM2 -- monitor on dexamethasone.  Reduced basal insulin from 18u to 14u 2/21 and prandial insulin from 6u to 4u 2/20 with some improvement but now low again -- will reduce basal insulin to 8u.  Cont to monitor.  Will also ask for supervised meals as pt is eating very little.    3. HTN -- controlled  4. Neuropathic pain -- numbness/tingling of fingers -- on gabapentin  5. CKD -- acute exacerbation now improved (CRT 1.9 on 2/20, 1.6 on 2/27) -- encouraging fluids.    6. Chronic hyponatremia -- stable (129 on 2/27).       Future Appointments  Date Time Provider Department Center   3/2/2017 8:00 AM Bill Goetz MD [facility] HEM ONC [facility]   3/7/2017 11:00 AM Franklyn Ng MD [facility] SPINE [address]   3/8/2017 10:30 AM LAB, HEMON CANCER BLDG [facility] LAB HO [facility]   3/8/2017 11:30 AM Bill Goetz MD [facility] HEM ONC [facility]             "

## 2017-03-01 LAB
POCT GLUCOSE: 128 MG/DL (ref 70–110)
POCT GLUCOSE: 150 MG/DL (ref 70–110)
POCT GLUCOSE: 168 MG/DL (ref 70–110)
POCT GLUCOSE: 172 MG/DL (ref 70–110)

## 2017-03-01 PROCEDURE — 97116 GAIT TRAINING THERAPY: CPT

## 2017-03-01 PROCEDURE — 97535 SELF CARE MNGMENT TRAINING: CPT

## 2017-03-01 PROCEDURE — 25000003 PHARM REV CODE 250: Performed by: PHYSICAL MEDICINE & REHABILITATION

## 2017-03-01 PROCEDURE — 97530 THERAPEUTIC ACTIVITIES: CPT

## 2017-03-01 PROCEDURE — 25000003 PHARM REV CODE 250: Performed by: ORTHOPAEDIC SURGERY

## 2017-03-01 PROCEDURE — 99233 SBSQ HOSP IP/OBS HIGH 50: CPT | Mod: ,,, | Performed by: PHYSICAL MEDICINE & REHABILITATION

## 2017-03-01 PROCEDURE — 12800000 HC REHAB SEMI-PRIVATE ROOM

## 2017-03-01 PROCEDURE — 92507 TX SP LANG VOICE COMM INDIV: CPT

## 2017-03-01 PROCEDURE — 97110 THERAPEUTIC EXERCISES: CPT

## 2017-03-01 RX ADMIN — GABAPENTIN 300 MG: 300 CAPSULE ORAL at 09:03

## 2017-03-01 RX ADMIN — GABAPENTIN 300 MG: 300 CAPSULE ORAL at 01:03

## 2017-03-01 RX ADMIN — CARVEDILOL 25 MG: 25 TABLET, FILM COATED ORAL at 09:03

## 2017-03-01 RX ADMIN — QUINAPRIL 20 MG: 20 TABLET ORAL at 09:03

## 2017-03-01 RX ADMIN — HEPARIN SODIUM 5000 UNITS: 5000 INJECTION, SOLUTION INTRAVENOUS; SUBCUTANEOUS at 05:03

## 2017-03-01 RX ADMIN — CARVEDILOL 25 MG: 25 TABLET, FILM COATED ORAL at 08:03

## 2017-03-01 RX ADMIN — HEPARIN SODIUM 5000 UNITS: 5000 INJECTION, SOLUTION INTRAVENOUS; SUBCUTANEOUS at 09:03

## 2017-03-01 RX ADMIN — OXYCODONE HYDROCHLORIDE 10 MG: 5 TABLET ORAL at 09:03

## 2017-03-01 RX ADMIN — FAMOTIDINE 20 MG: 20 TABLET, FILM COATED ORAL at 09:03

## 2017-03-01 RX ADMIN — HYDROMORPHONE HYDROCHLORIDE 6 MG: 2 TABLET ORAL at 11:03

## 2017-03-01 RX ADMIN — TAMSULOSIN HYDROCHLORIDE 0.4 MG: 0.4 CAPSULE ORAL at 09:03

## 2017-03-01 RX ADMIN — AMLODIPINE BESYLATE 10 MG: 10 TABLET ORAL at 09:03

## 2017-03-01 RX ADMIN — ZOLPIDEM TARTRATE 5 MG: 5 TABLET ORAL at 09:03

## 2017-03-01 RX ADMIN — FAMOTIDINE 20 MG: 20 TABLET, FILM COATED ORAL at 08:03

## 2017-03-01 RX ADMIN — GABAPENTIN 300 MG: 300 CAPSULE ORAL at 05:03

## 2017-03-01 NOTE — PROGRESS NOTES
"Speech Therapy  Progress Note Treatment    Rah Puente   MRN: [id number]     Short Term Goals:  1. Pt will be oriented x2 given max cues with 30% acc. MET  2. Pt will complete attention tasks requiring redirection x3 within 5 minute task. Continue  3. Pt will complete comprehension task given min verbal cues with 75% acc. Continue  4. Pt will complete expression task given min verbal cues with 75% acc. Continue  5. Pt will engage in appropriate social interaction with staff given max cues with 25% acc. Inconsistently MET/continue      Long Term Goals: Continue  1. Pt will be oriented x4 given moderate cues with 60% acc.  2. Pt will complete attention tasks requiring redirection x1 within 5 minute task.  3. Pt will complete comprehension task given supervision with 90% acc.  4. Pt will complete expression task given min verbal cues with 75% acc.  5. Pt will engage in appropriate social interaction with staff given mod cues with 50% acc.     03/01/17 0830   Speech Time Calculation   Speech Start Time 0830   Speech Stop Time 0915   Speech Total (min) 45 min   General Information   SLP Treatment Date 03/01/17   General Observations Pt seen sitting upright in w/c in ST office.   General Precautions diabetic;fall   Visual/Auditory Vision impaired   Subjective   Patient states Pt stated "You guys dont know how confused I am."   Pain/Comfort   Pain Rating 6/10   Location - Orientation lower   Location back   Pain Addressed Reposition;Distraction;Nurse notified       SLP Cognitive Treatment   Treatment Detail (SLP Cognitive Treatment) Pt engaged in reasoning task of participation in therapy services due to confusion. Pt requires moderate cues for engagement in tasks. Orientation task to person, place, time, and situation completed given total A for time and situation, pt oriented to person and place indly. Pt disoriented to year and month, despite visual and verbal cues, unable to orient pt. 3 step picture card " "sequencing task completed given frequent rest breaks with 60% acc indly, given moderate verbal cues and visual cues, pt with increase to 80% acc. During rest breaks, pt continued to request to remove c-collar every 1-2 minutes with poor recall of previous conversation and reasoning. Pt with report of lower back pain-reported to nursing via phone to SHAHRZAD Polanco, and pt re-positioned.        SLP Treatment: Verbal Expression   Treatment Detail (SLP Verbal Expression) Pt observed wtih wrod errors x3 within session with pt stating "I didnt mean to say that" after SLP repeated verbalization to pt.        SLP Treatment: Auditory Comprehension   Treatment Detail (SLP Auditory Comprehension) Following directions task completed with 60% acc indly, given moderate verbal cues with repetitions, pt with increase to 100% acc. With extended time.   Assessment   SLP Diagnosis mod-severe cognitive deficits; moderate language deficits   Prognosis Fair   Problem List poor orientation skills; poor awareness/insight; poor safety; severe confusion   Session Assessment progressing toward goals   Level of Motivation/Participation fair   Discharge Recommendations   Discharge Facility/Level Of Care Needs home   Plan   Plan Continue with current plan   Therapy Frequency Monday-Friday   SLP Follow-up   SLP Follow-up? Yes   Treatment/Billable Minutes   Speech Therapy Individual 45   Total Time 45     FIM Scores  Comprehension:3  Expression: 3  Social Interaction:2  Problem Solvin  Memory: 1      Comprehension:   Complex= humor, finances, rationale for medical treatment(hip precautions, pressure relief)  Basic= pain, hunger, thirst, bathroom needs, cold, nutrition, sleep      Understands basic 50-74%- May need parts of sentences repeated (3)      Expression:  Expresses basic 50-74% of time - Needs to repeat parts of sentences (3)      Social Interaction:  Interacts appropriately 25-49% of time - Needs frequent redirection (2)      Problem " Solving:  Solves basic problems less than 25% of time - Needs direction nearly all the time or does not effectively solve problems and my need a restraint for safety. Bed alarm on at all times. (1)      Memory:  Recognizes, recalls, or executes 1 step request less than 25% of time (1)    Ciarra Oh CCC-SLP  3/1/2017

## 2017-03-01 NOTE — PROGRESS NOTES
Referral faxed to [facility].  Pt and family meeting with oncologist tomorrow.  SW following to assist qwith discharge needs,

## 2017-03-01 NOTE — PROGRESS NOTES
"Occupational Therapy  PM Treatment Session  Rah Puente   MRN: [id number]      03/01/17 1447   OT Time Calculation   OT Start Time 1447   OT Stop Time 1532   OT Total Time (min) 45 min   General   OT Date of Treatment 03/01/17   Family/Caregiver Present No   Patient Found (position) Seated in wheelchair   Pt found with cervical collar   Precautions   General Precautions diabetic;fall   Required Braces or Orthoses Yes   Cervical Brace [place]   Visual/Auditory Vision impaired   Subjective   Patient states "Im done"   Pain/Comfort   Pain Rating 6/10   Location - Orientation medial   Location back   Pain Addressed Distraction   Bed Mobility   Rolling/Turning to Left Maximum assistance   Rolling/Turning Left Comments (A) for LE and trunk   Rolling/Turning Right Maximum assistance   Rolling/Turning Right Comments same   Scooting/Bridging Maximum Assistance   Scooting/Bridging Comments to EOM   Supine to Sit Total Assistance   Supine to Sit Comments @ Mat, (A) for trunk and LE management   Sit to Supine Total Assistance   Sit to Supine Comments same   Chair to Mat   Chair <>Mat Technique Stand Pivot   Chair <> Mat Assistance Total Assistance   Chair<> Mat Assistive Device No Assistive Device   Trials/Comments WC <> EOM; effort did not exceed 25%   Exercise Tools   UE Ergometer 8min forward no resistance for endurance  (MIN A for hand placement)   Additional Activities   Additional Activities Other (Comment)   Additional Activities Comments Pt supine @ Mat using 1# dowel (BUE AAROM) for humeral IR/ER and chest press 3/15x reps; -Pt @ wc level using BUE AAROM for 'squeeze ball' to promote  strength.   Activity Tolerance   Activity Tolerance Patient limited by fatigue   Medical Staff Made Aware Rn  (Notified Nursing (Sherri))   After Treatment   Patient Position After Treatment Seated in wheelchair  (safety belt)   Patient after treatment left nursing notified  (Notified Salvador Rodriguez line of sight from nursing station) "   Assessment   Prognosis Poor   Problem List Decreased Self Care skills;Decreased upper extremity range of motion;Decreased upper extremity strength;Decreased safe judgment during ADL;Decreased cognition;Decreased endurance;Decreased fine motor control;Decreased functional mobility;Decreased gross motor control;Decreased IADLs;Decreased Function of right upper extremity;Decreased Function of left upper extremity;Decreased trunk control for functional activities   Assessment Pt appeared lethargic and required MAX cues to arouse throughout session and MAX A to correct proper sitting posture. Pt limited be deconditioning this session and required BUE AAROM for all activity with difficulty even to maintain L Hand grasp. Pt would continue to benefit from OT services to increase functional mobility.   Level of Motivation/Participation fair   Barriers to Discharge Home environment accessibility limitations   Discharge Recommendations   Equipment Needed After Discharge wheelchair   Discharge Facility/Level Of Care Needs home   Plan   Plan Continue with current plan   Therapy Frequency 2 times/day;Monday-Friday;Saturday or Sunday   Treatment/Billable Minutes   Therapeutic Activity 15   Therapeutic Exercise 30   Total Time 45       The RESHMAR and GARDNER have collaborated and discussed the patient's status, treatment plan and progress toward established goals.     IRENE Theodore 3/1/2017

## 2017-03-01 NOTE — PROGRESS NOTES
Physical Therapy  PM Treatment    Rah Puente   MRN: [id number]          03/01/17 1410   PT Time Calculation   PT Start Time 1412   PT Stop Time 1442   PT Total Time (min) 30 min   Treatment   Treatment Type Treatment   PT/PTA PT   General   PT Received On 03/01/17   Family/Caregiver Present No   Patient Found (position) Seated in wheelchair   Pt found with cervical collar  (posey belt donned in pt dining room)   Precautions   General Precautions diabetic;fall   Required Braces or Orthoses Yes   Cervical Brace [place]   Visual/Auditory Vision impaired   Subjective   Patient states Pt not talkative throughout PT treatment session   Pain/Comfort   Pain Rating 6/10   Location - Side Bilateral   Location - Orientation generalized   Location neck   Pain Addressed Distraction;Reposition   Pain Rating Post-Intervention 6/10   Other Activities   Comments PT initially attempted to treat pt at 1357. Pt was not responding to PT initially but then pt continued to refuse treatment. PT attempted 2nd trial at 1412 pt was agreeable to participate    Pt performed wheelchair mobility approx 10 feet with B UE/B LE and maximum assistance. Increased time required  Pt performed seated B LE exercises 3 x 15 reps including: hip flexion, LAQ, hip ADD and ankle pumps, All exercises required active assist from PT to perform. Increased motivation from PT required for pt to fully participate.   PT educated pt on importance of pressure relief techniques including lateral weight shifts and wheelchair pushups. Patient required increased motivation to fully participate in all activities   Activity Tolerance   Activity Tolerance Patient limited by pain;Patient limited by fatigue   After Treatment   Patient Position After Treatment Seated in wheelchair   Plan   Planned Therapy Intervention Continue with current plan   Physical Therapy Follow-up   PT Follow-up? Yes   PT - Next Visit Date 03/02/17   Treatment/Billable Minutes   Therapeutic  Exercise 30   Total Time 30       Pilar Liao, PT  3/1/2017

## 2017-03-01 NOTE — PROGRESS NOTES
"Occupational Therapy   PM Treatment    Rah Puente   MRN: [id number]     - Pt scheduled for fine motor group this afternoon, found in dining room only wearing boxers and a t-shirt. OT assisted in donning pants with Total Assist x 2 helpers, pt unable to assist with stand or donning pants on this date.    - Pt brought to group however did not actively participate in any activities despite max encouragement and HOHA. Pt appears very confused, stating "Just put it in the garage" "You can let him in".  Pt brought to dining area for supervision and NSG notified.      03/01/17 1315   OT Time Calculation   OT Start Time 1315   OT Stop Time 1330   OT Total Time (min) 15 min   General   OT Date of Treatment 03/01/17   Family/Caregiver Present Yes  (friends present in dining room)   Patient Found (position) Seated in wheelchair   Pt found with cervical collar   Plan   Plan Continue with current plan   Occupational Therapy Follow-up   OT Follow-up? Yes   Treatment/Billable Minutes   Self Care/Home Management 15   Total Time 15       ZORAIDA Kee   3/1/2017        "

## 2017-03-01 NOTE — PROGRESS NOTES
Physical Therapy   Treatment    Rah Puente   MRN: [id number]   PTA visit      03/01/17 0945   PT Time Calculation   PT Start Time 0945   PT Stop Time 1030   PT Total Time (min) 45 min   Treatment   Treatment Type Treatment   PT/PTA PTA   PTA Visit Number 3   General   PT Received On 03/01/17   Patient Found (position) Supine in bed   Pt found with cervical collar   Precautions   General Precautions diabetic;fall   Required Braces or Orthoses Yes   Cervical Brace [place]   Visual/Auditory Vision impaired   Subjective   Patient states Pt reported increased neck discomfort   Pain/Comfort   Pain Rating 6/10   Location - Side Bilateral   Location - Orientation generalized   Location neck   Pain Addressed Pre-medicate for activity;Reposition;Distraction;Cessation of Activity;Nurse notified   Pain Rating Post-Intervention 4/10   Bed Mobility   Bed Mobility yes   Supine to Sit Minimum Assistance   Supine to Sit Comments bed, cues for sequence   Transfers   Transfer yes   Sit to Stand   Sit <> Stand Assistance Minimum Assistance   Sit <> Stand Assistive Device No Assistive Device  (//bars)   Trials/Comments one trial each   Stand to Sit   Assistance Minimum Assistance   Trials/Comments to wc   Bed to Chair   Bed <> Chair Technique Squat Pivot   Bed <> Chair Assistance Minimum Assistance   Bed <> Chair Assistive Device No Assistive Device   Wheelchair Activities   Propulsion Yes   Propulsion Type 1 Manual   Level 1 Level tile   Method 1 Right upper extremity;Left upper extremity   Level of Assistance 1 Minimum assistance  (pt required assist to attend to task)   Description/ Details 1 70 feet, 95 feet   Gait   Gait Yes   Weight Bearing Status FWB   Gait 1   Surface 1 Level tile   Gait Assistive Device Parallel bars   Other Apparatus 1 Wheelchair follow   Assistance 1 Minimum assistance;Total assistance  (volunteer followed w/ wc)   Gait Distance 3 feet   Gait Pattern swing-to gait   Gait Deviation(s) decreased  davion;decreased step length;decreased toe-to-floor clearance;decreased weight-shifting ability   Impairments Contributing to Gait Deviations impaired balance;impaired coordination;decreased flexibility;pain;impaired postural control;decreased strength   Stairs   Stairs No   Balance   Balance Yes   Static Standing Balance   Static Standing-Balance Support Right upper extremity supported;Left upper extremity supported   Static Standing-Level of Assistance Minimum assistance   Static Standing-Comment/# of Minutes < 20 sec standing in // bars   Supine   Supine-Exercises Lower extremity   Supine-Exercise Type Ankle pumps;SLR;ABD/ADD;Heel slides   Supine-Exercise Comments x 15 reps   Activity Tolerance   Activity Tolerance Patient tolerated treatment well   After Treatment   Patient Position After Treatment Seated in wheelchair   Patient after treatment left call button in reach;nursing notified   Assessment   Prognosis Fair   Problem List Decreased strength;Decreased range of motion;Decreased endurance;Impaired balance;Decreased mobility;Decreased cognition;Impaired judgement;Decreased safety awareness;Impaired vision;Pain   Session Assessment progressing toward goals   Assessment Pt w/ fair motivation, participation and compliance today; pt ambulated 3 feet in // bars today, total A due to volunteer following w/ wc.   Level of Motivation/Participation fair   Plan   Planned Therapy Intervention Continue with current plan   Therapy Frequency Monday-Friday;Saturday or Sunday   Treatment/Billable Minutes   Gait training 10   Therapeutic Activity 15   Therapeutic Exercise 20   Total Time 45       Scout Pack, PTA  3/1/2017

## 2017-03-01 NOTE — PROGRESS NOTES
Occupational Therapy  Treatment FIM  Rah Puente   MRN: [id number]          03/01/17 1500   Transfers   Bed/Chair/WC 1   Communication   Comprehension 4   Mode B   Expression 3   Mode B   Social Cognition   Social Interaction 5   Problem Solving 2   Memory 2     IRENE Theodore 3/1/2017

## 2017-03-01 NOTE — PROGRESS NOTES
Pt without new c/o.    Interval hx:  I have reviewed the pt's HPI and PFSH and it is unchanged from admission.     VSS - Temp:  [98.5 °F (36.9 °C)-98.7 °F (37.1 °C)] 98.7 °F (37.1 °C)  Pulse:  [86-89] 89  Resp:  [16-18] 18  SpO2:  [94 %-95 %] 94 %  BP: (112-142)/(62-67) 142/67    Review of Systems   Eyes: Negative for blurred vision.   Respiratory: Negative for cough.   Cardiovascular: Negative for chest pain.   Gastrointestinal: Negative for nausea and vomiting.   Genitourinary: Negative for dysuria.   Musculoskeletal: Positive for back pain and neck pain. Negative for myalgias.   Skin: Negative.   Neurological: Positive for tingling and weakness. Negative for dizziness, sensory change and headaches.   Psychiatric/Behavioral: Negative for depression. The patient has insomnia.      Physical Exam   Constitutional: He is oriented to person, place, and time. He appears well-nourished.   Eyes: EOM are normal. Pupils are equal, round, and reactive to light.   Neck: Neck supple.   Cardiovascular: Normal rate.   Pulmonary/Chest: Effort normal.   Abdominal: Soft.   Musculoskeletal:   BUEs AROM WNL  LLE AROM WNL  RLE with diminished HF   Neurological: He is oriented to person, place, and time.   LUE 4-/5 except 3+/5 EE  RUE 4/5 except 4-/5 EE  LLE 4/5  RLE 3+/5 HF/HE, 4-/5 KF/KE/DF/PF   Skin: No rash noted.   Psychiatric: He has a normal mood and affect.     Assessment:    1. S/P C4-7 fusion with residual cervical myelopathy -- unchanged.   Sit-->stand MNA (improved), T/Fs MNA/MDA (improved), 3' MNA/MDA RW.  UBD MDA (improved), LBD DEP, toileting DEP on 2/27.  Refusing some therapy activities.    -- moderately confused -- has been since admission.  Oxycontin d/c'd with some improvement but confusion waxes/wanes.  This appears to be delirium.  MRI of brain did not show parenchymal mets.  I will change oxycodone to hydromorphone for pain which is signficant -- he c/o pain in all therapy sessions.   Insomnia seems improved with  "Ambien.  [facility] WNL.  -- cognitive deficits -- improving.  In the most recent ST session:  "3 step picture card sequencing task completed given frequent rest breaks with 60% acc indly, given moderate verbal cues and visual cues, pt with increase to 80% acc."  2. DM2 -- improved control -- on dexamethasone.  Reduced basal insulin from 18u to 14u 2/21 and prandial insulin from 6u to 4u 2/20 then reduced basal insulin to 8u 2/27.  Cont to monitor.  Ordered supervised meals as pt is eating very little.    3. HTN -- controlled  4. Neuropathic pain -- numbness/tingling of fingers -- on gabapentin  5. CKD -- acute exacerbation now improved (CRT 1.9 on 2/20, 1.6 on 2/27) -- encouraging fluids.    6. Chronic hyponatremia -- stable (129 on 2/27).       Future Appointments  Date Time Provider Department Center   3/2/2017 8:00 AM Bill Goetz MD [facility] HEM ONC [facility]   3/7/2017 11:00 AM Franklyn Ng MD [facility] SPINE [address]   3/8/2017 10:30 AM LAB, HEMON CANCER BLDG [facility] LAB HO [facility]   3/8/2017 11:30 AM Bill Goetz MD [facility] HEM ONC [facility]             "

## 2017-03-01 NOTE — PROGRESS NOTES
Physical Therapy  FIM    Rah Puente   MRN: [id number]   PTA visit      03/01/17 1200   Transfers   Bed/Chair/WC 4   Locomotion   Distance Walked 1   Distance Wheelchair 2   Walk 1   Wheelchair 2   Mode C         Scout Pack, PTA  3/1/2017

## 2017-03-02 ENCOUNTER — TELEPHONE (OUTPATIENT)
Dept: HEMATOLOGY/ONCOLOGY | Facility: CLINIC | Age: 62
End: 2017-03-02

## 2017-03-02 ENCOUNTER — OFFICE VISIT (OUTPATIENT)
Dept: HEMATOLOGY/ONCOLOGY | Facility: CLINIC | Age: 62
DRG: 949 | End: 2017-03-02
Attending: PHYSICAL MEDICINE & REHABILITATION
Payer: COMMERCIAL

## 2017-03-02 DIAGNOSIS — R52 PAIN: Primary | ICD-10-CM

## 2017-03-02 LAB
ANION GAP SERPL CALC-SCNC: 8 MMOL/L
BASOPHILS # BLD AUTO: 0.01 K/UL
BASOPHILS NFR BLD: 0.1 %
BUN SERPL-MCNC: 40 MG/DL
CALCIUM SERPL-MCNC: 7.6 MG/DL
CHLORIDE SERPL-SCNC: 95 MMOL/L
CO2 SERPL-SCNC: 25 MMOL/L
CREAT SERPL-MCNC: 2.2 MG/DL
DIFFERENTIAL METHOD: ABNORMAL
EOSINOPHIL # BLD AUTO: 0.1 K/UL
EOSINOPHIL NFR BLD: 1 %
ERYTHROCYTE [DISTWIDTH] IN BLOOD BY AUTOMATED COUNT: 13.2 %
EST. GFR  (AFRICAN AMERICAN): 35.5 ML/MIN/1.73 M^2
EST. GFR  (NON AFRICAN AMERICAN): 30.7 ML/MIN/1.73 M^2
GLUCOSE SERPL-MCNC: 105 MG/DL
HCT VFR BLD AUTO: 25.6 %
HGB BLD-MCNC: 8.2 G/DL
LYMPHOCYTES # BLD AUTO: 1.4 K/UL
LYMPHOCYTES NFR BLD: 15.9 %
MCH RBC QN AUTO: 28.4 PG
MCHC RBC AUTO-ENTMCNC: 32 %
MCV RBC AUTO: 89 FL
MONOCYTES # BLD AUTO: 0.5 K/UL
MONOCYTES NFR BLD: 5.9 %
NEUTROPHILS # BLD AUTO: 6.7 K/UL
NEUTROPHILS NFR BLD: 77.1 %
PLATELET # BLD AUTO: 222 K/UL
PMV BLD AUTO: 11.7 FL
POCT GLUCOSE: 113 MG/DL (ref 70–110)
POCT GLUCOSE: 126 MG/DL (ref 70–110)
POCT GLUCOSE: 151 MG/DL (ref 70–110)
POCT GLUCOSE: 96 MG/DL (ref 70–110)
POTASSIUM SERPL-SCNC: 4.5 MMOL/L
RBC # BLD AUTO: 2.89 M/UL
SODIUM SERPL-SCNC: 128 MMOL/L
WBC # BLD AUTO: 8.64 K/UL

## 2017-03-02 PROCEDURE — 3074F SYST BP LT 130 MM HG: CPT | Mod: S$GLB,,, | Performed by: INTERNAL MEDICINE

## 2017-03-02 PROCEDURE — 12800000 HC REHAB SEMI-PRIVATE ROOM

## 2017-03-02 PROCEDURE — 85025 COMPLETE CBC W/AUTO DIFF WBC: CPT

## 2017-03-02 PROCEDURE — 97535 SELF CARE MNGMENT TRAINING: CPT

## 2017-03-02 PROCEDURE — 25000003 PHARM REV CODE 250: Performed by: PHYSICAL MEDICINE & REHABILITATION

## 2017-03-02 PROCEDURE — 97530 THERAPEUTIC ACTIVITIES: CPT

## 2017-03-02 PROCEDURE — 86580 TB INTRADERMAL TEST: CPT | Performed by: PHYSICAL MEDICINE & REHABILITATION

## 2017-03-02 PROCEDURE — 63600175 PHARM REV CODE 636 W HCPCS: Performed by: PHYSICAL MEDICINE & REHABILITATION

## 2017-03-02 PROCEDURE — 96372 THER/PROPH/DIAG INJ SC/IM: CPT | Mod: S$GLB,,, | Performed by: PHYSICAL MEDICINE & REHABILITATION

## 2017-03-02 PROCEDURE — 92507 TX SP LANG VOICE COMM INDIV: CPT

## 2017-03-02 PROCEDURE — 3078F DIAST BP <80 MM HG: CPT | Mod: S$GLB,,, | Performed by: INTERNAL MEDICINE

## 2017-03-02 PROCEDURE — 25000003 PHARM REV CODE 250: Performed by: ORTHOPAEDIC SURGERY

## 2017-03-02 PROCEDURE — 97110 THERAPEUTIC EXERCISES: CPT

## 2017-03-02 PROCEDURE — 99215 OFFICE O/P EST HI 40 MIN: CPT | Mod: S$GLB,,, | Performed by: INTERNAL MEDICINE

## 2017-03-02 PROCEDURE — 80048 BASIC METABOLIC PNL TOTAL CA: CPT

## 2017-03-02 PROCEDURE — 36415 COLL VENOUS BLD VENIPUNCTURE: CPT

## 2017-03-02 PROCEDURE — 1160F RVW MEDS BY RX/DR IN RCRD: CPT | Mod: S$GLB,,, | Performed by: INTERNAL MEDICINE

## 2017-03-02 PROCEDURE — 99999 PR PBB SHADOW E&M-EST. PATIENT-LVL II: CPT | Mod: PBBFAC,,, | Performed by: INTERNAL MEDICINE

## 2017-03-02 PROCEDURE — 99233 SBSQ HOSP IP/OBS HIGH 50: CPT | Mod: ,,, | Performed by: PHYSICAL MEDICINE & REHABILITATION

## 2017-03-02 RX ORDER — SODIUM CHLORIDE 9 MG/ML
INJECTION, SOLUTION INTRAVENOUS CONTINUOUS
Status: DISCONTINUED | OUTPATIENT
Start: 2017-03-02 | End: 2017-03-04 | Stop reason: HOSPADM

## 2017-03-02 RX ORDER — HYDROMORPHONE HYDROCHLORIDE 2 MG/ML
0.5 INJECTION, SOLUTION INTRAMUSCULAR; INTRAVENOUS; SUBCUTANEOUS ONCE
Status: DISCONTINUED | OUTPATIENT
Start: 2017-03-02 | End: 2017-03-02 | Stop reason: HOSPADM

## 2017-03-02 RX ADMIN — GABAPENTIN 300 MG: 300 CAPSULE ORAL at 09:03

## 2017-03-02 RX ADMIN — TUBERCULIN PURIFIED PROTEIN DERIVATIVE 5 UNITS: 5 INJECTION, SOLUTION INTRADERMAL at 04:03

## 2017-03-02 RX ADMIN — FAMOTIDINE 20 MG: 20 TABLET, FILM COATED ORAL at 10:03

## 2017-03-02 RX ADMIN — OXYCODONE HYDROCHLORIDE 10 MG: 5 TABLET ORAL at 01:03

## 2017-03-02 RX ADMIN — SODIUM CHLORIDE 1000 ML: 0.9 INJECTION, SOLUTION INTRAVENOUS at 02:03

## 2017-03-02 RX ADMIN — TAMSULOSIN HYDROCHLORIDE 0.4 MG: 0.4 CAPSULE ORAL at 08:03

## 2017-03-02 RX ADMIN — SODIUM CHLORIDE 100 ML/HR: 0.9 INJECTION, SOLUTION INTRAVENOUS at 06:03

## 2017-03-02 RX ADMIN — CARVEDILOL 25 MG: 25 TABLET, FILM COATED ORAL at 10:03

## 2017-03-02 RX ADMIN — GABAPENTIN 300 MG: 300 CAPSULE ORAL at 01:03

## 2017-03-02 RX ADMIN — HEPARIN SODIUM 5000 UNITS: 5000 INJECTION, SOLUTION INTRAVENOUS; SUBCUTANEOUS at 01:03

## 2017-03-02 RX ADMIN — QUINAPRIL 20 MG: 20 TABLET ORAL at 10:03

## 2017-03-02 RX ADMIN — STANDARDIZED SENNA CONCENTRATE AND DOCUSATE SODIUM 1 TABLET: 8.6; 5 TABLET, FILM COATED ORAL at 10:03

## 2017-03-02 RX ADMIN — AMLODIPINE BESYLATE 10 MG: 10 TABLET ORAL at 10:03

## 2017-03-02 RX ADMIN — FAMOTIDINE 20 MG: 20 TABLET, FILM COATED ORAL at 08:03

## 2017-03-02 RX ADMIN — TAMSULOSIN HYDROCHLORIDE 0.4 MG: 0.4 CAPSULE ORAL at 10:03

## 2017-03-02 RX ADMIN — CARVEDILOL 25 MG: 25 TABLET, FILM COATED ORAL at 08:03

## 2017-03-02 RX ADMIN — GABAPENTIN 300 MG: 300 CAPSULE ORAL at 05:03

## 2017-03-02 RX ADMIN — HEPARIN SODIUM 5000 UNITS: 5000 INJECTION, SOLUTION INTRAVENOUS; SUBCUTANEOUS at 09:03

## 2017-03-02 RX ADMIN — SODIUM CHLORIDE TAB 1 GM 1 G: 1 TAB at 09:03

## 2017-03-02 RX ADMIN — HEPARIN SODIUM 5000 UNITS: 5000 INJECTION, SOLUTION INTRAVENOUS; SUBCUTANEOUS at 05:03

## 2017-03-02 RX ADMIN — ZOLPIDEM TARTRATE 5 MG: 5 TABLET ORAL at 09:03

## 2017-03-02 RX ADMIN — HYDROMORPHONE HYDROCHLORIDE 0.5 MG: 2 INJECTION, SOLUTION INTRAMUSCULAR; INTRAVENOUS; SUBCUTANEOUS at 09:03

## 2017-03-02 NOTE — LETTER
March 2, 2017      Franklyn Ng MD  [address]  [facility] [address]           [facility] Hematology Oncology  [address]  [facility] [address]  Phone: [phone]          Patient: Rah Puente   MR Number: [id number]   YOB: 1955   Date of Visit: 3/2/2017       Dear Dr. Franklyn Ng:    Thank you for referring Rah Puente to me for evaluation. Attached you will find relevant portions of my assessment and plan of care.    If you have questions, please do not hesitate to call me. I look forward to following Rah Puente along with you.    Sincerely,    Bill Goetz MD    Enclosure  CC:  No Recipients    If you would like to receive this communication electronically, please contact [email or web address] or [phone] to request more information on [email or web address] Link access.    For providers and/or their staff who would like to refer a patient to [facility], please contact us through our one-stop-shop provider referral line, [facility], at [phone].    If you feel you have received this communication in error or would no longer like to receive these types of communications, please e-mail [email or web address]

## 2017-03-02 NOTE — PROGRESS NOTES
Occupational Therapy  Tx FIM  Rah Puente   MRN: [id number]          03/02/17 1555   Transfers   Bed/Chair/WC 1   Self Care   Grooming 4   Dressing-Upper 3   Dressing-Lower 1   Communication   Comprehension 4   Mode B   Expression 3   Mode B   Social Cognition   Social Interaction 5   Problem Solving 2   Memory 2         IRENE Theodore 3/2/2017

## 2017-03-02 NOTE — PROGRESS NOTES
Pt presents today with multiple family members from out of town and a friend.  He is in a wheelchair and in acute pain.  He is confused.  We briefly reviewed his scans.  Discussed that bone bx was inconclusive.   Gave pt Dilaudid 0.5mg SQ for pain control x1 during exam.  Discussed poor prognosis and limited treatment options given poor PS.  Pt and his power of atty are not interested in repeat bx or palliative chemo at this time.  They prefer to transfer him to an in-patient hospice near [place].  Pt and family met with our SW Monalisa Eng who is working with rehab case management to get this arranged.  I think this is a good idea and a reasonable approach.    The patient agrees with the plan, and all questions have been answered to their satisfaction.      More than 45 mins were spent during this encounter, greater than 50% was spent in direct counseling and/or coordination of care.     Bill Goetz M.D., M.S.  Hematology and Oncology Attending  [facility]  [facility]

## 2017-03-02 NOTE — PROGRESS NOTES
"Speech Therapy   Treatment    Rah Puente   MRN: [id number]        03/02/17 1105   Speech Time Calculation   Speech Start Time 1105   Speech Stop Time 1150   Speech Total (min) 45 min   General Information   SLP Treatment Date 03/02/17   General Observations Pt seen individually in  following SHARHZAD Polanco, administering medication at bedside.    General Precautions diabetic;fall   Subjective   Patient states Pt stated "Can you flip that mouse container around for me to use" while pointing to table in  office.   Pain/Comfort   Pain Rating no pain       SLP Cognitive Treatment   Treatment Detail (SLP Cognitive Treatment) Pt with severe confusion continuing without evidence of response to therapeutic intervention from ST standpoint. Orientation task to person and place completed requiring max verbal and visual cues with 15% acc. Pt with poor attention skills, possible hallucinations (talking to people not present in room), and agitation t/o session. Nursing requesting pt to increase fluid intake; therefore, pt engaged in consuming cup of H2O. Pt ID'd problem/hazard within picture card with 72% acc, given moderate-max cues for itdentifying objects, pt with increase to 71% acc.        SLP Treatment: Verbal Expression   Treatment Detail (SLP Verbal Expression) Divergent naming task completed given max verbal and tactile cues of body parts with 80% acc. Pt with evidence of word finding errors and severe disorientation.        SLP Treatment: Auditory Comprehension   Treatment Detail (SLP Auditory Comprehension) Following simple directions task completed given max cues of redirection to task with 70% acc.   Assessment   SLP Diagnosis severe cognitive deficits; moderate language deficits   Prognosis Fair   Problem List severe confusion; agitation; limited participation; poor awareness/insight; poor safety skills   Session Assessment no significant change in cognitive language   Level of Motivation/Participation fair "   Discharge Recommendations   Discharge Facility/Level Of Care Needs home health speech therapy;nursing facility, skilled  (24 hour supervision)   Plan   Plan Continue with current plan   Therapy Frequency Monday-Friday   SLP Follow-up   SLP Follow-up? Yes   Treatment/Billable Minutes   Speech Therapy Individual 45   Total Time 45     FIM Scores  Comprehension:3  Expression: 3  Social Interaction:2  Problem Solvin  Memory: 1      Comprehension:   Complex= humor, finances, rationale for medical treatment(hip precautions, pressure relief)  Basic= pain, hunger, thirst, bathroom needs, cold, nutrition, sleep      Understands basic 50-74%- May need parts of sentences repeated (3)      Expression:  Expresses basic 50-74% of time - Needs to repeat parts of sentences (3)      Social Interaction:  Interacts appropriately 25-49% of time - Needs frequent redirection (2)      Problem Solving:  Solves basic problems less than 25% of time - Needs direction nearly all the time or does not effectively solve problems and my need a restraint for safety. Bed alarm on at all times. (1)      Memory:  Recognizes, recalls, or executes 1 step request less than 25% of time (1)    Ciarra Oh CCC-SLP  3/2/2017

## 2017-03-02 NOTE — PROGRESS NOTES
"Occupational Therapy  PM Treatment Session  Rah Puente   MRN: [id number]        03/02/17 1411   OT Time Calculation   OT Start Time 1411   OT Stop Time 1542   OT Total Time (min) 91 min   General   OT Date of Treatment 03/02/17   Family/Caregiver Present Yes   Patient Found (position) Seated in wheelchair   Pt found with cervical collar   Precautions   General Precautions diabetic;fall   Required Braces or Orthoses Yes   Cervical Brace [place]   Visual/Auditory Vision impaired   Subjective   Patient states "No, maybe some other time", "where's the doll"    Pain/Comfort   Pain Rating other (see comments)   Pain Comment Pt did not rate pain   Bed Mobility   Scooting/Bridging Maximum Assistance   Scooting/Bridging Comments @ EOB   Sit to Supine Total Assistance   Sit to Supine Comments for trunk and LEs   Chair to Bed   Technique Squat Pivot   Assistance Total Assistance   Assistive Device No Assistive Device   Trials/Comments v/c's for hand placement/technique; Pt kept letting go of grasp with posterior lean; (A) during pivot/control descent   Grooming   Additional Grooming yes   Grooming Level of Assistance Minimum assistance   Hand Washing Level of Assistance Stand by assistance   Face Washing Level of Assistance Supervision   Shaving/Makeup Level of Assistance Moderate assistance  (set up electric razor and positioning)   Grooming Where Assessed Sitting sinkside   Grooming Comments Pt 3/4 steps   UE Dressing   UE Dressing Level of Assistance Moderate assistance   UE Dressing Where Assessed Edge of bed   UE Dressing Comments to doff/Darrel pullover shirt   LE Dressing   Sock Level of Assistance Total assistance   Exercise Tools   Exercise Tools Yes   Other Exercise Tool 2 1# dowel (bicep curls) 3/10x reps  (required rest beaks )   Additional Activities   Additional Activities Other (Comment)   Additional Activities Comments Pt seated @ wc level using 'squeeze ball' ~10min each side for  strength; MOD cues " for reps; -FM Activity: Pt seated @ wc level using BUE AROM for manipulating objects and accurately identifying 6/10 objects.; -Pt @ wc level using BUE AROM 1# dumbell 3/10x reps requiring MOD cues to complete task.; TA: Pt and family instructed for safety and proper SQPT technique from WC > EOB and Sit > Supine (MAX A).  (Pt required increased time throughout and MOD cues.)   Activity Tolerance   Activity Tolerance Patient tolerated treatment well   After Treatment   Patient Position After Treatment Supine in bed   Patient after treatment left call button in reach   Assessment   Prognosis Poor   Problem List Decreased Self Care skills;Decreased upper extremity range of motion;Decreased upper extremity strength;Decreased safe judgment during ADL;Decreased cognition;Decreased endurance;Decreased fine motor control;Decreased functional mobility;Decreased gross motor control;Decreased IADLs;Decreased Function of right upper extremity;Decreased Function of left upper extremity;Decreased trunk control for functional activities   Assessment Pt and family present at begining and end of session observing/receiving instruction for SQPT. Pt's family instructed for x2 to assist with TFs for safety - Pt very unsteady and with posterior lean. Pt is limited by BUE/BLE weakness and is deconditioned.    Level of Motivation/Participation Fair   Barriers to Discharge Home environment accessibility limitations   Discharge Recommendations   Equipment Needed After Discharge wheelchair   Discharge Facility/Level Of Care Needs hospice facility  (Pt's family and  working on plans.)   Plan   Plan Continue with current plan   Therapy Frequency 2 times/day;Monday-Friday;Saturday or Sunday   Treatment/Billable Minutes   Self Care/Home Management 36   Therapeutic Activity 25   Therapeutic Exercise 30   Total Time 91       IRENE Theodore 3/2/2017

## 2017-03-02 NOTE — TELEPHONE ENCOUNTER
----- Message from Monalisa Eng LCSW sent at 3/2/2017 10:49 AM CST -----  Pt's sister and HPOA - Shital Correia, phone number [phone], needs to be listed as his emergency contact instead of patient's friend Cielo Cummins. I just spoke with his sister via phone re: this and advised her to speak with the nursing staff at Rehab about changing this on his hospital admission face sheet. Can you update this in the demographic section of his chart for the clinic side?

## 2017-03-02 NOTE — PROGRESS NOTES
ELEN met with pt's sister Shital, friend Melody and son.   Family has decided to take pt to [place] and place in inpatient hospice.  [facility] has been selected, and are in network with pt's insurer.  ELEN faxed referral and informed family that insurer reported that transportation to [place] would not be provided.   Family reported that they will purchase ticket for pt to fly to [place].  Pt scheduled to discharge tomorrow, 3/3/17, however discharge pending acceptance to hospice facility.

## 2017-03-02 NOTE — PROGRESS NOTES
Physical Therapy   Daily Physical Therapy FIM Scores    Rah Puente   MRN: [id number]          03/02/17 1500   Transfers   Bed/Chair/WC 2   Toilet 2   Locomotion   Distance Walked 1   Distance Wheelchair 1   Walk 1   Wheelchair 1   Mode C     Pilar Liao, PT  3/2/2017

## 2017-03-02 NOTE — PROGRESS NOTES
Pt without new c/o.    Interval hx:  I have reviewed the pt's HPI and PFSH and it is unchanged from admission.     VSS - Temp:  [98.1 °F (36.7 °C)-98.2 °F (36.8 °C)] 98.1 °F (36.7 °C)  Pulse:  [86-95] 86  Resp:  [18] 18  SpO2:  [96 %] 96 %  BP: ()/(53-58) 108/58    Review of Systems   Eyes: Negative for blurred vision.   Respiratory: Negative for cough.   Cardiovascular: Negative for chest pain.   Gastrointestinal: Negative for nausea and vomiting.   Genitourinary: Negative for dysuria.   Musculoskeletal: Positive for back pain and neck pain. Negative for myalgias.   Skin: Negative.   Neurological: Positive for tingling and weakness. Negative for dizziness, sensory change and headaches.   Psychiatric/Behavioral: Negative for depression. The patient has insomnia.      Physical Exam   Constitutional: He is oriented to person, place, and time. He appears well-nourished.   Eyes: EOM are normal. Pupils are equal, round, and reactive to light.   Neck: Neck supple.   Cardiovascular: Normal rate.   Pulmonary/Chest: Effort normal.   Abdominal: Soft.   Musculoskeletal:   BUEs AROM WNL  LLE AROM WNL  RLE with diminished HF   Neurological: He is oriented to person, place, and time.   LUE 4-/5 except 3+/5 EE  RUE 4/5 except 4-/5 EE  LLE 4/5  RLE 3+/5 HF/HE, 4-/5 KF/KE/DF/PF   Skin: No rash noted.   Psychiatric: He has a normal mood and affect.     Assessment:    1. S/P C4-7 fusion with residual cervical myelopathy -- unchanged.   Sit-->stand MNA (improved), T/Fs MNA/MDA (improved), 3' MNA/MDA RW.  UBD MDA (improved), LBD DEP, toileting DEP on 2/27.  Refusing some therapy activities.    -- moderately confused -- has been since admission.  Oxycontin d/c'd with some improvement but confusion waxes/wanes.  This appears to be delirium.  MRI of brain did not show parenchymal mets.  I will change oxycodone to hydromorphone for pain which is signficant -- he c/o pain in all therapy sessions.   Insomnia seems improved with Ambien.   "CBC WNL.  -- cognitive deficits -- improving.  In the most recent ST session:  "3 step picture card sequencing task completed given frequent rest breaks with 60% acc indly, given moderate verbal cues and visual cues, pt with increase to 80% acc."  2. DM2 -- improved control -- on dexamethasone.  Reduced basal insulin from 18u to 14u 2/21 and prandial insulin from 6u to 4u 2/20 then reduced basal insulin to 8u 2/27.  Cont to monitor.  Ordered supervised meals as pt is eating very little.    3. HTN -- controlled  4. Neuropathic pain -- numbness/tingling of fingers -- on gabapentin  5. CKD -- acute exacerbation again -- CRT 2.2 -- encouraging fluids with limited success.   Will start IVF.    6. Chronic hyponatremia -- some decline (128 on 3/2) -- NS today.  Will also start NaCl tablets in view of need to encourage oral fluids.      Future Appointments  Date Time Provider Department Center   3/7/2017 11:00 AM Franklyn Ng MD [facility] SPINE [address]   3/8/2017 10:30 AM LAB, HEMONC CANCER BLDG [facility] LAB [facility]   3/8/2017 11:30 AM Bill Goetz MD [facility] HEM ONC [place]             "

## 2017-03-02 NOTE — PROGRESS NOTES
"Physical Therapy   PT Treatment    Rah Puente   MRN: [id number]      03/02/17 1305   PT Time Calculation   PT Start Time 1305   PT Stop Time 1350   PT Total Time (min) 45 min   Treatment   Treatment Type Treatment  (FIM Assessment)   PT/PTA PT   General   PT Received On 03/02/17   Family/Caregiver Present No   Patient Found (position) Supine in bed   Pt found with cervical collar   Precautions   General Precautions diabetic;fall   Required Braces or Orthoses Yes   Cervical Brace [place]   Visual/Auditory Vision impaired   Subjective   Patient states "I have to go get those prints down to the shop today"   Pain/Comfort   Location - Side Bilateral   Location - Orientation generalized   Location (buttocks)   Pain Addressed Reposition   Bed Mobility   Bed Mobility yes   Supine to Sit Moderate Assistance   Supine to Sit Comments x 1 trial on mat   Transfers   Transfer yes   Sit to Stand   Sit <> Stand Assistance Moderate Assistance   Sit <> Stand Assistive Device Other (see comments)  (parallel bars)   Trials/Comments Pt performed x 1 trial, physical assistance required to elevate hips out of wheelchair to complete this transfer    Stand to Sit   Assistance Minimum Assistance;Moderate Assistance   Assistive Device Other (see comments)  (parallel bars)   Trials/Comments Pt performed x 1 trial, assistance required to control descent into wheelchair    Bed to Chair   Bed <> Chair Technique Squat Pivot   Bed <> Chair Assistance Moderate Assistance   Bed <> Chair Assistive Device No Assistive Device   Trials/Comments x 1 trial, pt required maximum verbal cues to perform this transfer with appropriate sequence. Pt required maximum encouragement from PT to fully participate in this transfer    Wheelchair Activities   Propulsion Yes   Propulsion Type 1 Manual   Level 1 Level tile   Method 1 Right upper extremity;Left upper extremity   Level of Assistance 1 Minimum assistance   Description/ Details 1 Pt propelled self in " wheelchair x 2 trials with minimal assistance from PT required to maintain pt's attention to activity: 1st trial: 11 feet, 2nd trial: 13 feet. Pt required maximum encouragement to fully participate in this activity with PT    Gait   Gait Yes   Weight Bearing Status full   Gait 1   Surface 1 Level tile   Gait Assistive Device Parallel bars   Other Apparatus 1 Wheelchair follow   Assistance 1 Minimum assistance;Moderate assistance;Total assistance   Gait Distance Pt ambulated in parallel bars x 1 trial of approximately 4 feet, pt required seated rest breaks between trial due to fatigue. Pt deferred performing further ambulation trials with PT    Gait Pattern swing-to gait   Gait Deviation(s) decreased davion;increased time in double stance;decreased stride length;decreased step length;decreased toe-to-floor clearance;decreased weight-shifting ability;forward lean   Impairments Contributing to Gait Deviations impaired balance;decreased strength;impaired sensory feedback;impaired motor control;impaired postural control;decreased flexibility   Other Activities   Comments Patient performed squat pivot transfer from wheelchair to commode and commode to wheelchair with moderate assistance. Pt required maximum encouragement in order to fully participate in PT.   Activity Tolerance   Activity Tolerance Patient limited by fatigue;Other (Comment)  (Patient limited by impaired cognition)   After Treatment   Patient Position After Treatment Seated in wheelchair   Patient after treatment left nursing notified  (posey belt donned, in gym )   Assessment   Prognosis Fair   Problem List Decreased strength;Decreased endurance;Decreased range of motion;Impaired balance;Decreased mobility;Impaired judgement;Decreased safety awareness;Decreased cognition;Pain;Impaired vision   Session Assessment other (see comments)  (slow progress)   Assessment Pt is greatly limited by pain and poor cognition. Pt required maximum encouragement from PT  to participate limitedly with PT. Pt requires signifcant assistance and supervision for all mobility at this time and will require this level of assistance upon D/C.    Level of Motivation/Participation fair   Barriers to Discharge Inaccessible home environment;Decreased caregiver support   Barriers to Discharge Comments 5 JULIA home, pt lives alone   Discharge Recommendations   Equipment Needed After Discharge wheelchair   Discharge Facility/Level Of Care Needs home health PT   Plan   Planned Therapy Intervention Continue with current plan   Therapy Frequency Monday-Friday;Saturday or Sunday   Physical Therapy Follow-up   PT Follow-up? Yes   PT - Next Visit Date 03/03/17   Treatment/Billable Minutes   Therapeutic Activity 45   Total Time 45       Pilar Liao, PT  3/2/2017

## 2017-03-02 NOTE — TELEPHONE ENCOUNTER
"Spoke to Shiv with transportation for [place] to have the "wheel chair" van come to transport patient back to the rehab center.  His family states he is very restless and need to go back ASAP.  Relayed message to transportation staff.   "

## 2017-03-02 NOTE — PROGRESS NOTES
The right neck/collar bone junction has a shearing wound noted from the C-collar- crusted over, pink.dry- ~ 2cmL x 1 cm W, red/intact kendrick-wound- blanches.  Mepilex foam dressing placed over the wound to protect the wound and promote healing- nursing to change every Monday/Thursday.  The sacral/coccyx/ left & right buttock have an evolving DTI-unstageable pressure injury with black eschar noted on the buttocks, open wound edges/red tissue noted on the gluteal cleft/moist, edematous.  Recommend nursing clean the wound with wound cleanser, apply Santyl to the wound/eschar areas to enzymatic debride the wound, cover with moisten gauze to activate Santyl then cover with a mepore border dressing daily.  Nursing to continue care. Recommend a LOPEZ overlay mattress to redistribute pressure.         03/02/17 1312       Pressure Ulcer 02/14/17 1635 medial sacral spine suspected deep tissue injury   Date First Assessed/Time First Assessed: 02/14/17 1635   Pressure Ulcer Present on Admission: no  Orientation: medial  Location: sacral spine  Staging: suspected deep tissue injury  Wound Length (cm): 3  Wound Width (cm): 2  Depth (cm): (c)    Staging Unstageable   Healing Pressure Ulcer no   Pressure Ulcer Risk Factors mobility;nutrition;shear/friction;moisture   Dressing Appearance no dressing   Drainage Amount none   Appearance black eschar;reddened;moist   Periwound Area ecchymotic  (maroon/purple)   Wound Edges open   Wound Length (cm) 10   Wound Width (cm) 12   Cleansed W/ wound cleanser   Interventions barrier applied     Consent was received from the patient for the wound photograph.

## 2017-03-03 LAB
ANION GAP SERPL CALC-SCNC: 9 MMOL/L
BUN SERPL-MCNC: 37 MG/DL
CALCIUM SERPL-MCNC: 7.6 MG/DL
CHLORIDE SERPL-SCNC: 100 MMOL/L
CO2 SERPL-SCNC: 22 MMOL/L
CREAT SERPL-MCNC: 1.8 MG/DL
EST. GFR  (AFRICAN AMERICAN): 45.3 ML/MIN/1.73 M^2
EST. GFR  (NON AFRICAN AMERICAN): 39.2 ML/MIN/1.73 M^2
GLUCOSE SERPL-MCNC: 71 MG/DL
POCT GLUCOSE: 139 MG/DL (ref 70–110)
POCT GLUCOSE: 151 MG/DL (ref 70–110)
POCT GLUCOSE: 199 MG/DL (ref 70–110)
POCT GLUCOSE: 74 MG/DL (ref 70–110)
POTASSIUM SERPL-SCNC: 4.4 MMOL/L
SODIUM SERPL-SCNC: 131 MMOL/L

## 2017-03-03 PROCEDURE — 36415 COLL VENOUS BLD VENIPUNCTURE: CPT

## 2017-03-03 PROCEDURE — 97535 SELF CARE MNGMENT TRAINING: CPT

## 2017-03-03 PROCEDURE — 25000003 PHARM REV CODE 250: Performed by: PHYSICAL MEDICINE & REHABILITATION

## 2017-03-03 PROCEDURE — 97150 GROUP THERAPEUTIC PROCEDURES: CPT

## 2017-03-03 PROCEDURE — 97530 THERAPEUTIC ACTIVITIES: CPT

## 2017-03-03 PROCEDURE — 12800000 HC REHAB SEMI-PRIVATE ROOM

## 2017-03-03 PROCEDURE — 80048 BASIC METABOLIC PNL TOTAL CA: CPT

## 2017-03-03 PROCEDURE — 99233 SBSQ HOSP IP/OBS HIGH 50: CPT | Mod: ,,, | Performed by: PHYSICAL MEDICINE & REHABILITATION

## 2017-03-03 PROCEDURE — 25000003 PHARM REV CODE 250: Performed by: ORTHOPAEDIC SURGERY

## 2017-03-03 PROCEDURE — 97803 MED NUTRITION INDIV SUBSEQ: CPT

## 2017-03-03 PROCEDURE — 92507 TX SP LANG VOICE COMM INDIV: CPT

## 2017-03-03 RX ADMIN — SODIUM CHLORIDE TAB 1 GM 1 G: 1 TAB at 07:03

## 2017-03-03 RX ADMIN — CARVEDILOL 25 MG: 25 TABLET, FILM COATED ORAL at 07:03

## 2017-03-03 RX ADMIN — HYDROMORPHONE HYDROCHLORIDE 6 MG: 2 TABLET ORAL at 10:03

## 2017-03-03 RX ADMIN — GABAPENTIN 300 MG: 300 CAPSULE ORAL at 09:03

## 2017-03-03 RX ADMIN — FAMOTIDINE 20 MG: 20 TABLET, FILM COATED ORAL at 07:03

## 2017-03-03 RX ADMIN — COLLAGENASE SANTYL: 250 OINTMENT TOPICAL at 07:03

## 2017-03-03 RX ADMIN — QUINAPRIL 20 MG: 20 TABLET ORAL at 07:03

## 2017-03-03 RX ADMIN — HEPARIN SODIUM 5000 UNITS: 5000 INJECTION, SOLUTION INTRAVENOUS; SUBCUTANEOUS at 05:03

## 2017-03-03 RX ADMIN — ZOLPIDEM TARTRATE 5 MG: 5 TABLET ORAL at 08:03

## 2017-03-03 RX ADMIN — GABAPENTIN 300 MG: 300 CAPSULE ORAL at 02:03

## 2017-03-03 RX ADMIN — TAMSULOSIN HYDROCHLORIDE 0.4 MG: 0.4 CAPSULE ORAL at 07:03

## 2017-03-03 RX ADMIN — HEPARIN SODIUM 5000 UNITS: 5000 INJECTION, SOLUTION INTRAVENOUS; SUBCUTANEOUS at 09:03

## 2017-03-03 RX ADMIN — ONDANSETRON 8 MG: 8 TABLET, ORALLY DISINTEGRATING ORAL at 09:03

## 2017-03-03 RX ADMIN — HYDROMORPHONE HYDROCHLORIDE 6 MG: 2 TABLET ORAL at 07:03

## 2017-03-03 RX ADMIN — AMLODIPINE BESYLATE 10 MG: 10 TABLET ORAL at 07:03

## 2017-03-03 RX ADMIN — GABAPENTIN 300 MG: 300 CAPSULE ORAL at 05:03

## 2017-03-03 RX ADMIN — SODIUM CHLORIDE 100 ML: 0.9 INJECTION, SOLUTION INTRAVENOUS at 04:03

## 2017-03-03 RX ADMIN — STANDARDIZED SENNA CONCENTRATE AND DOCUSATE SODIUM 1 TABLET: 8.6; 5 TABLET, FILM COATED ORAL at 07:03

## 2017-03-03 RX ADMIN — HEPARIN SODIUM 5000 UNITS: 5000 INJECTION, SOLUTION INTRAVENOUS; SUBCUTANEOUS at 02:03

## 2017-03-03 NOTE — PROGRESS NOTES
"Occupational Therapy   Treatment    Rah Puente  MRN: [id number]  Room/Bed: E257/E257 B       03/03/17 0731 03/03/17 1445   OT Time Calculation   OT Start Time 0731 1445   OT Stop Time 0821 1530   OT Total Time (min) 50 min 45 min   General   OT Date of Treatment 03/03/17 03/03/17   Family/Caregiver Present No --    Patient Found (position) Supine in bed --    Pt found with cervical collar --    Precautions   General Precautions diabetic;fall --    Cervical Brace [place] --    Visual/Auditory Vision impaired --    Subjective   Patient states "I'm sorry.  I wish I could do better."  --    Pain/Comfort   Pain Rating 4/10 --    Location neck --    Bed Mobility   Rolling/Turning to Left Minimum assistance;With side rail --    Rolling/Turning Right Stand by assistance;With side rail --    Supine to Sit Moderate Assistance --    Supine to Sit Comments Assist for trunk elevation --    Sit to Stand   Sit <> Stand Assistance Maximum Assistance --    Stand to Sit   Assistance Maximum Assistance --    Bed to Chair   Bed <> Chair Technique Squat Pivot --    Bed <> Chair Transfer Assistance Total Assistance --    Feeding   Feeding Level of Assistance Set-up Assistance --    Grooming   Grooming Level of Assistance Minimum assistance  (assist with 1/4 tasks) --    Hand Washing Level of Assistance Supervision --    Face Washing Level of Assistance Supervision --    Oral Hygiene Level of Assistance Stand by assistance --    Hair Grooming Level of Assistance Minimum assistance --    Bathing   Bathing Level of Assistance Moderate assistance --    Bathing Where Assessed Bed;Wheelchair --    Bathing Comments Assist to bathe B upper arms, B lower legs, buttocks --    UE Dressing   UE Dressing Level of Assistance Maximum assistance --    UE Dressing Where Assessed Wheelchair --    LE Dressing   LE Dressing  Level of Assistance Total assistance --    LE Dressing Level of Assistance Total assistance --    Sock Level of Assistance Total " assistance --    Adult Briefs Level of Assistance Total assistance --    LE Dressing Where Assessed Wheelchair --    Toileting   Toileting Level of Assistance Total assistance --    OT Therapeutic Groups   Other --  Patient participated in 45 minute volleyball group activity.  The group activity challenged dynamic standing/sitting skills, bilateral upper extremity strengthening, cardiovascular and respiratory capacity, hand -eye coordination, visual scanning and social affect/mood.  The patient performed this activity at w/c level with __Max__assist. The patient required _several___ rest breaks during this activity.  Patient performed activity using bilateral upper extremities to volley the ball, lateral arm movement noted throughout the activity.  Endurance __fair___, no pain complaints voiced are observed , social affect good as patient was observed throughout this activity ie., appropriately engaged in social exchange with peers and staff.   Additional Activities   Additional Activities Comments  --    Activity Tolerance   Activity Tolerance Patient tolerated treatment well;Patient limited by fatigue;Treatment limited secondary to medical complications (Comment) --    After Treatment   Patient Position After Treatment Seated in wheelchair --    Assessment   Prognosis Poor --    Problem List Decreased Self Care skills;Decreased upper extremity range of motion;Decreased upper extremity strength;Decreased safe judgment during ADL;Decreased cognition;Decreased endurance;Decreased fine motor control;Decreased functional mobility;Decreased gross motor control;Decreased IADLs;Decreased Function of right upper extremity;Decreased Function of left upper extremity;Decreased trunk control for functional activities --    Assessment Pt. pleasant and cooperative with OT this date.  Pt. continues to require extensive assistance with ADLs, t/fs.  Limited by overall weakness, limited ROM at B shoulders, decreased trunk control.   Pt. will benefit form inpatient OT to increase (I) and safety with ADLs, t/fs, functional mobility, UE ROM, strength.     Level of Motivation/Participation Fair --    Barriers to Discharge Home environment accessibility limitations;Decreased caregiver support --    Discharge Recommendations   Equipment Needed After Discharge wheelchair --    Discharge Facility/Level Of Care Needs hospice facility --    Plan   Plan Continue with current plan --    Therapy Frequency 2 times/day;Monday-Friday --    Occupational Therapy Follow-up   OT Follow-up? Yes --    Treatment/Billable Minutes   Self Care/Home Management 50 --    Therapeutic Group --  45   Total Time 50 45       ZROAIDA Velasquez  3/3/2017

## 2017-03-03 NOTE — PROGRESS NOTES
"Physical Therapy  AM Treatment    Rah Puente   MRN: [id number]    03/03/17 0947   PT Time Calculation   PT Start Time 0947   PT Stop Time 1032   PT Total Time (min) 45 min   Treatment   Treatment Type Treatment   PT/PTA PT   General   PT Received On 03/03/17   Missed Time Reason Not applicable   Family/Caregiver Present No   Patient Found (position) Seated in wheelchair   Pt found with cervical collar   Precautions   General Precautions diabetic;fall   Required Braces or Orthoses Yes   Cervical Brace [place]   Visual/Auditory Vision impaired   Subjective   Patient states "I'm gonna grab your tit and twist it so that you'll understand what kind of pain I'm in."   Pain/Comfort   Pain Rating 7/10   Location - Side Bilateral   Location - Orientation generalized   Location (buttocks)   Pain Addressed Reposition  (PT gave patient a roho cushion )   Bed Mobility   Bed Mobility yes   Rolling/Turning to Left Contact guard assistance  (pt rolled about half way with CGA and decline to finish roll)   Rolling/Turning Right Contact guard assistance   Supine to Sit Total Assistance   Supine to Sit Comments Pt reported a lot of L hip pain and asked PT to assist him to sit back up. SPT supported patient's legs (patient reported increased pain when letting them hang off EOM), while PT assisted for trunk elevation   Sit to Supine Contact Guard Assistance   Transfers   Transfer yes   Sit to Stand   Sit <> Stand Assistance Moderate Assistance   Sit <> Stand Assistive Device Other (see comments)  (// bars)   Trials/Comments x 2 trials in // bars wtih Mod A for hip elevation   Stand to Sit   Assistance Minimum Assistance   Assistive Device Other (see comments)  (// bars)   Trials/Comments x 2 trials with Min A for control of descent   Chair to Bed   Technique Squat Pivot   Assistance Minimum Assistance   Assistive Device No Assistive Device   Trials/Comments x 1 with Min A for hip elevation and CGA for control of descent   Chair to " Mat   Chair<> Mat Technique Squat Pivot   Chair<>Mat Assistance Minimum Assistance   Chair <> Mat Assistive Device No Assistive Device   Trials/Comments x 1 trial similar to above   Mat to Chair   Technique Squat Pivot   Assistance Minimum Assistance   Assistive Device No Assistive Device   Trials/Comments x 1 similar to above   Tub Bench Transfer   Technique Squat Pivot   Assistance Minimum Assistance   Assistive Device No Assistive Device   Trials/Comments Pt performed SQPT with Min A for hip elevation to TTB, but patient refused to bring his legs into the tub due to increased pain and then proceeded to SQPT back to the chair with Min A. Transfer incomplete.   Wheelchair Activities   Propulsion No  (not performed due to time constraints)   Gait   Gait Yes   Weight Bearing Status FWB   Gait 1   Surface 1 Level tile   Gait Assistive Device Parallel bars   Other Apparatus 1 Wheelchair follow   Assistance 1 Contact Guard Assistance   Gait Distance 3 feet and 5 feet in // bars with CGA for safety; seated rest break between trials due to pain and fatigue   Gait Pattern swing-to gait   Gait Deviation(s) decreased davion;increased time in double stance;decreased velocity of limb motion;decreased step length;decreased stride length   Impairments Contributing to Gait Deviations impaired balance;decreased strength;impaired postural control   Stairs   Stairs No   Other Activities   Other Activities Other (Comment)  (wheelchair <> BSC transfer)   Comments Pt transferred wheelchair <> BSC with SQPT and Min A for hip elevation and CGA for control of descent   Activity Tolerance   Activity Tolerance Patient limited by fatigue;Patient limited by pain   After Treatment   Patient Position After Treatment Supine in bed   Assessment   Prognosis Fair   Problem List Decreased strength;Decreased endurance;Impaired balance;Decreased mobility;Decreased cognition;Decreased safety awareness;Pain   Session Assessment other (see  comments)  (slow progress)   Assessment Pt continues to make slow progress with therapy mainly due to cognitive deficits and pain. Pt continues to require maximum encouragement to participate in therapy sessions. Pt was able to perform multiple transfers during today's session with prolonged rest breaks throughout activity. Pt will require significant assist at d/c due to cognitive deficits and decreased mobility.   Level of Motivation/Participation fair   Barriers to Discharge Inaccessible home environment;Decreased caregiver support   Barriers to Discharge Comments 5 JULIA and lives alone   Discharge Recommendations   Equipment Needed After Discharge wheelchair   Discharge Facility/Level Of Care Needs hospice facility  (patient's family and  making plans )   Plan   Planned Therapy Intervention Continue with current plan   Therapy Frequency 2 times/day;Monday-Friday;Saturday or Sunday   Physical Therapy Follow-up   PT Follow-up? Yes   PT - Next Visit Date 03/04/17   Treatment/Billable Minutes   Gait training 5   Therapeutic Activity 40   Total Time 45   Alisa Barillas DPT  3/3/2017

## 2017-03-03 NOTE — PROGRESS NOTES
Occupational Therapy   FIM scores    Rah Puente  MRN: [id number]  Room/Bed: E257/E257 B       03/03/17 1500   Transfers   Bed/Chair/WC 1   Self Care   Eating 5   Grooming 4   Bathing 3   Dressing-Upper 3   Dressing-Lower 1   Toileting 1       ZORAIDA Velasquez  3/3/2017

## 2017-03-03 NOTE — PROGRESS NOTES
Physical Therapy   Care Plan/FIM    Rah Puente   MRN: [id number]      03/03/17 1100   Transfers   Bed/Chair/WC 1   Toilet 3   Locomotion   Distance Walked 1   Walk 1   Mode C   Alisa Barillas DPT  3/3/2017

## 2017-03-03 NOTE — PLAN OF CARE
Problem: Pressure Ulcer Risk (Otis Scale) (Adult,Obstetrics,Pediatric)  Goal: Skin Integrity  Patient will demonstrate the desired outcomes by discharge/transition of care.   Outcome: Ongoing (interventions implemented as appropriate)  Patient turned q2h to prevent further skin break down.

## 2017-03-03 NOTE — CONSULTS
"Nutrition consult ordered by RN re: "pt refusing to eat". Considering pt's POC includes tx to Hospice facility upon discharge, it is not surprising he is not eating much. Cont w/ current diet & OS order. If MD desires, could order appetite stimulant. See initial nutr eval on 2/27 for full assessment.  "

## 2017-03-03 NOTE — PROGRESS NOTES
Pt without new c/o.    Interval hx:  I have reviewed the pt's HPI and PFSH and it is unchanged from admission.     VSS - Temp:  [98.5 °F (36.9 °C)-98.8 °F (37.1 °C)] 98.5 °F (36.9 °C)  Pulse:  [86-88] 88  Resp:  [16-18] 16  SpO2:  [96 %] 96 %  BP: (103-113)/(58-61) 113/61    Review of Systems   Eyes: Negative for blurred vision.   Respiratory: Negative for cough.   Cardiovascular: Negative for chest pain.   Gastrointestinal: Negative for nausea and vomiting.   Genitourinary: Negative for dysuria.   Musculoskeletal: Positive for back pain and neck pain. Negative for myalgias.   Skin: Negative.   Neurological: Positive for tingling and weakness. Negative for dizziness, sensory change and headaches.   Psychiatric/Behavioral: Negative for depression. The patient has insomnia.      Physical Exam   Constitutional: He is oriented to person, place, and time. He appears well-nourished.   Eyes: EOM are normal. Pupils are equal, round, and reactive to light.   Neck: Neck supple.   Cardiovascular: Normal rate.   Pulmonary/Chest: Effort normal.   Abdominal: Soft.   Musculoskeletal:   BUEs AROM WNL  LLE AROM WNL  RLE with diminished HF   Neurological: He is oriented to person, place, and time.   LUE 4-/5 except 3+/5 EE  RUE 4/5 except 4-/5 EE  LLE 4/5  RLE 3+/5 HF/HE, 4-/5 KF/KE/DF/PF   Skin: No rash noted.   Psychiatric: He has a normal mood and affect.     Assessment:    1. S/P C4-7 fusion with residual cervical myelopathy -- unchanged.   Sit-->stand MNA (improved), T/Fs MNA (improved), 5' CGA //.  UBD MDA (improved), LBD DEP, toileting DEP on 3/2.  -- moderately confused -- has been since admission.  Oxycontin d/c'd with some improvement but confusion waxes/wanes.  This appears to be delirium.  MRI of brain did not show parenchymal mets.  I will change oxycodone to hydromorphone for pain which is signficant -- he c/o pain in all therapy sessions.   Insomnia seems improved with Ambien.  CBC WNL.  -- cognitive deficits --  "improving but quite confused at times.  In the most recent ST session:  "Patient completed naming task, in which he named objects, given a description. Completed task with 90% accy indepebndently. Patient completed naming task, in which he named verbs, given a description. Completed task with 80% accy independently, 100% accy given min verbal cues. Patient participated in open-ended sentence completion task with 100% accy with appropriate answers."  2. DM2 -- improved control -- on dexamethasone.  Reduced basal insulin from 18u to 14u 2/21 and prandial insulin from 6u to 4u 2/20 then reduced basal insulin to 8u 2/27.  He is still having low CBGs due to poor oral intake -- will d/c both basal and prandial insulin.  Ordered supervised meals.    3. HTN -- controlled  4. Neuropathic pain -- numbness/tingling of fingers -- on gabapentin  5. CKD -- acute exacerbation again -- CRT 1.8 on 3/3 (improved) -- encouraging fluids with limited success.   Started IVF on 3/2 and will continue for 2L NS.  Check again 3/6.    6. Chronic hyponatremia -- 128 on 3/2 -- improved 3/3 to 131 with NS IVF and NaCl tabs (started due to need to encourage fluids).      Future Appointments  Date Time Provider Department Center   3/7/2017 11:00 AM Franlkyn Ng MD [facility] [address]             "

## 2017-03-03 NOTE — PROGRESS NOTES
"Speech Therapy   Treatment    Rah Puente   MRN: [id number]            03/03/17 0832   Speech Time Calculation   Speech Start Time 0832   Speech Stop Time 0917   Speech Total (min) 45 min   General Information   SLP Treatment Date 03/03/17   General Observations Pt seen for part of session while sitting up in w/c in ST office. End of session completed in patient's room 2/2 patient reported "feeling nauseous and about to vomit." Nurse notified.    General Precautions diabetic;fall   Subjective   Patient states "Do I ever get to have my wife come visit me again?"   Pain/Comfort   Pain Rating no pain       SLP Cognitive Treatment   Treatment Detail (SLP Cognitive Treatment) Patient oriented x2 (person and date). However, noted that when ST asked patient again at end of session to ID date, pt stated, "I have no idea." Pt disoriented to place "[facility]."  When asked where patient lives, patient stated, "I don't know, [facility] it [facility]?"  ST elicited recall of visitors from Saturday (02/25/17), pt recalled "my sister from [place]," after telling ST on Saturday that sister was from [place]. ST asked patient if either of his sisters live in [place], patient stated, "no never have." Patient with increased confusion t/o session. Patient stated, "Do I ever get to have my wife come visit me again?" ST asked patient if he is , patient stated, "No, I was."       SLP Treatment: Verbal Expression   Treatment Detail (SLP Verbal Expression) Patient completed naming task, in which he named objects, given a description. Completed task with 90% accy indepebndently.  Patient completed naming task, in which he named verbs, given a description. Completed task with 80% accy independently, 100% accy given min verbal cues.  Patient participated in open-ended sentence completion task with 100% accy with appropriate answers.         SLP Treatment: Auditory Comprehension   Treatment Detail (SLP Auditory " "Comprehension) Patient engaged in auditory comprehension task, in which he answered content related questions about brief passage read aloud to him. Pt completed task with 40% accy independently. 50% accy given min verbal cues. Noted that this was about the time patient's attention decreased and patient requested to go back to room because he was "feeling nauseous."   Assessment   SLP Diagnosis severe cog-ling deficits    Prognosis Good   Problem List decreased recall; decreased orientation; decreased auditory comprehension   Session Assessment progressing toward goals   Level of Motivation/Participation fair   Plan   Plan Continue with current plan   Therapy Frequency Monday-Friday   SLP Follow-up   SLP Follow-up? Yes   Treatment/Billable Minutes   Speech Therapy Individual 45   Total Time 45       FIM Scores  Comprehension:3  Expression: 3  Social Interaction:2  Problem Solvin  Memory: 1      Comprehension:   Complex= humor, finances, rationale for medical treatment(hip precautions, pressure relief)  Basic= pain, hunger, thirst, bathroom needs, cold, nutrition, sleep      Understands basic 50-74%- May need parts of sentences repeated (3)      Expression:  Expresses basic 50-74% of time - Needs to repeat parts of sentences (3)      Social Interaction:  Interacts appropriately 25-49% of time - Needs frequent redirection (2)      Problem Solving:  Solves basic problems less than 25% of time - Needs direction nearly all the time or does not effectively solve problems and my need a restraint for safety. Bed alarm on at all times. (1)      Memory:  Recognizes, recalls, or executes 1 step request less than 25% of time (1)       AKILAH Dominguez-SLP  3/3/2017    "

## 2017-03-04 VITALS
RESPIRATION RATE: 16 BRPM | BODY MASS INDEX: 18.97 KG/M2 | DIASTOLIC BLOOD PRESSURE: 58 MMHG | HEIGHT: 74 IN | SYSTOLIC BLOOD PRESSURE: 112 MMHG | HEART RATE: 86 BPM | TEMPERATURE: 98 F | WEIGHT: 147.81 LBS | OXYGEN SATURATION: 92 %

## 2017-03-04 LAB
POCT GLUCOSE: 118 MG/DL (ref 70–110)
POCT GLUCOSE: 171 MG/DL (ref 70–110)

## 2017-03-04 PROCEDURE — 25000003 PHARM REV CODE 250: Performed by: ORTHOPAEDIC SURGERY

## 2017-03-04 PROCEDURE — 99238 HOSP IP/OBS DSCHRG MGMT 30/<: CPT | Mod: ,,, | Performed by: PHYSICAL MEDICINE & REHABILITATION

## 2017-03-04 PROCEDURE — 25000003 PHARM REV CODE 250: Performed by: PHYSICAL MEDICINE & REHABILITATION

## 2017-03-04 RX ORDER — QUINAPRIL 20 MG/1
20 TABLET ORAL DAILY
Qty: 30 TABLET | Refills: 3 | Status: SHIPPED | OUTPATIENT
Start: 2017-03-04 | End: 2018-03-04

## 2017-03-04 RX ORDER — ONDANSETRON 8 MG/1
8 TABLET, ORALLY DISINTEGRATING ORAL EVERY 8 HOURS PRN
Qty: 60 TABLET | Refills: 3 | Status: SHIPPED | OUTPATIENT
Start: 2017-03-04

## 2017-03-04 RX ORDER — ADHESIVE BANDAGE
30 BANDAGE TOPICAL DAILY PRN
COMMUNITY
Start: 2017-03-04

## 2017-03-04 RX ORDER — AMLODIPINE BESYLATE 10 MG/1
10 TABLET ORAL DAILY
Qty: 30 TABLET | Refills: 3 | Status: SHIPPED | OUTPATIENT
Start: 2017-03-04 | End: 2018-03-04

## 2017-03-04 RX ORDER — GABAPENTIN 300 MG/1
300 CAPSULE ORAL 3 TIMES DAILY
Qty: 90 CAPSULE | Refills: 3 | Status: SHIPPED | OUTPATIENT
Start: 2017-03-04 | End: 2018-03-04

## 2017-03-04 RX ORDER — ACETAMINOPHEN 325 MG/1
650 TABLET ORAL EVERY 6 HOURS PRN
Refills: 0 | COMMUNITY
Start: 2017-03-04

## 2017-03-04 RX ORDER — AMOXICILLIN 250 MG
1 CAPSULE ORAL DAILY
COMMUNITY
Start: 2017-03-04

## 2017-03-04 RX ORDER — MAG HYDROX/ALUMINUM HYD/SIMETH 200-200-20
15 SUSPENSION, ORAL (FINAL DOSE FORM) ORAL EVERY 6 HOURS PRN
COMMUNITY
Start: 2017-03-04 | End: 2018-03-04

## 2017-03-04 RX ORDER — BISACODYL 10 MG
10 SUPPOSITORY, RECTAL RECTAL DAILY PRN
Refills: 0 | COMMUNITY
Start: 2017-03-04

## 2017-03-04 RX ORDER — FAMOTIDINE 20 MG/1
20 TABLET, FILM COATED ORAL 2 TIMES DAILY
Qty: 60 TABLET | Refills: 3 | Status: SHIPPED | OUTPATIENT
Start: 2017-03-04 | End: 2018-03-04

## 2017-03-04 RX ORDER — CARVEDILOL 25 MG/1
25 TABLET ORAL 2 TIMES DAILY
Qty: 60 TABLET | Refills: 3 | Status: SHIPPED | OUTPATIENT
Start: 2017-03-04 | End: 2018-03-04

## 2017-03-04 RX ORDER — METHOCARBAMOL 750 MG/1
750 TABLET, FILM COATED ORAL 3 TIMES DAILY PRN
Qty: 90 TABLET | Refills: 3 | Status: SHIPPED | OUTPATIENT
Start: 2017-03-04 | End: 2017-03-14

## 2017-03-04 RX ORDER — ZOLPIDEM TARTRATE 5 MG/1
5 TABLET ORAL NIGHTLY
Qty: 30 TABLET | Refills: 3 | Status: SHIPPED | OUTPATIENT
Start: 2017-03-04 | End: 2017-09-02

## 2017-03-04 RX ORDER — OXYCODONE HYDROCHLORIDE 10 MG/1
10 TABLET ORAL EVERY 4 HOURS PRN
Qty: 90 TABLET | Refills: 0 | Status: SHIPPED | OUTPATIENT
Start: 2017-03-04

## 2017-03-04 RX ORDER — TAMSULOSIN HYDROCHLORIDE 0.4 MG/1
0.4 CAPSULE ORAL NIGHTLY
Qty: 30 CAPSULE | Refills: 3 | Status: SHIPPED | OUTPATIENT
Start: 2017-03-04 | End: 2018-03-04

## 2017-03-04 RX ADMIN — HEPARIN SODIUM 5000 UNITS: 5000 INJECTION, SOLUTION INTRAVENOUS; SUBCUTANEOUS at 02:03

## 2017-03-04 RX ADMIN — QUINAPRIL 20 MG: 20 TABLET ORAL at 08:03

## 2017-03-04 RX ADMIN — TAMSULOSIN HYDROCHLORIDE 0.4 MG: 0.4 CAPSULE ORAL at 08:03

## 2017-03-04 RX ADMIN — AMLODIPINE BESYLATE 10 MG: 10 TABLET ORAL at 08:03

## 2017-03-04 RX ADMIN — HEPARIN SODIUM 5000 UNITS: 5000 INJECTION, SOLUTION INTRAVENOUS; SUBCUTANEOUS at 05:03

## 2017-03-04 RX ADMIN — OXYCODONE HYDROCHLORIDE 10 MG: 5 TABLET ORAL at 02:03

## 2017-03-04 RX ADMIN — SODIUM CHLORIDE TAB 1 GM 1 G: 1 TAB at 08:03

## 2017-03-04 RX ADMIN — GABAPENTIN 300 MG: 300 CAPSULE ORAL at 05:03

## 2017-03-04 RX ADMIN — FAMOTIDINE 20 MG: 20 TABLET, FILM COATED ORAL at 08:03

## 2017-03-04 RX ADMIN — CARVEDILOL 25 MG: 25 TABLET, FILM COATED ORAL at 08:03

## 2017-03-04 RX ADMIN — COLLAGENASE SANTYL: 250 OINTMENT TOPICAL at 08:03

## 2017-03-04 RX ADMIN — OXYCODONE HYDROCHLORIDE 10 MG: 5 TABLET ORAL at 08:03

## 2017-03-04 RX ADMIN — GABAPENTIN 300 MG: 300 CAPSULE ORAL at 02:03

## 2017-03-04 NOTE — NURSING
Informed patient of discharge instructions. Verbalized understanding of discharge instructions and medications. IV removed. Pt left with SPD services and family at side. Patient left with all valuables and belongings.

## 2017-03-04 NOTE — DISCHARGE SUMMARY
"[facility]  Discharge Summary      Admit Date: 2/17/2017    Discharge Date and Time: 3/4/17    Attending Physician: Brody Savage MD     Reason for Admission: Cervical myelopathy    Hospital Course (synopsis of major diagnoses, care, treatment, and services provided during the course of the hospital stay):  Rah Puente is a 61-year-old male with PMHx of HTN, DMII with neuropathy, BPH, R hip fracture (summer 2016), chronic hyponatremia, and colon cancer s/p colectomy (3/2016) with metastatic disease to lungs and bone (found on CT 1/2017 when evaluated for worsening neck and back pain). Patient presented to [facility] from hem/onc clinic on 2/9/17 for neck pain and progressively worsening BUE>BLE R>L weakness x 2 weeks with associated gait instability and falls. MRI C/T/L revealed metastatic disease throughout the cervical, thoracic, and lumbosacral spine and pelvis with C5 compression/burst pathologic fracture with retropulsion into canal. MRI brain showed ossesous calvarial mets but no acute intracranial structures. Retroperitoneal US consistent with chronic renal disease and suggestive of liver metastasis. Evaluated by ortho and underwent C5-C6 corpectomy with C4-7 anterior cervical fusion on 2/13/17 after which rad txs were started. The pt demonstrated significant mobility and ADL impairments including wit-->stand MNA, T/Fs MNA, ability to walk only 13' MNA, UB dressing MXA, LB dressing DEP. He was admitted to [facility] to address the mobility and ADL impairments. His CC was "walking".    The pt was started in the rehab program with limited results.  He presented with mild confusion which quickly worsened and was the limiting factor in his ability to progress.  He participated in most therapy sessions but required constant cues for tasks and often gave up some tasks prematurely.  At d/c he requires:   Sit-->stand minimal assistance (improved), transfers minimal assistance (improved), gait 5' " contact guard assistance in parallel bars only.  Upper body dressing moderate assistance (improved), lower body dressing and toileting dependent.    The pt's oral intake has been poor (<25% meals, minimal fluids) even with direct supervision.  He developed acute on chronic renal insufficiency which required IVF in the past week.  Baseline CRT is about 1.5.  His buttock wound is granulating and healing slowly.  The pt's DM was initially treated with both basal and prandial insulin but with the decline in his appetite all insulin was d/c'd except SS insulin.  CBGs have been in the range of 120-180 generally without insulin.  Neuropathic pain (numbness/fingers) is controlled with gabapentin.  He is discharged to hospice in [place].      Final Diagnoses:    Principal Problem: <principal problem not specified>   Secondary Diagnoses:   Active Hospital Problems    Diagnosis  POA    Cervical myelopathy [G95.9]  Yes    CKD (chronic kidney disease) stage 2, GFR 60-89 ml/min [N18.2]  Yes    Neuropathic pain [M79.2]  Yes    Type 2 diabetes mellitus with diabetic polyneuropathy, without long-term current use of insulin [E11.42]  Yes    Essential hypertension [I10]  Yes    Chronic hyponatremia [E87.1]  Yes      Resolved Hospital Problems    Diagnosis Date Resolved POA   No resolved problems to display.       Discharged Condition: fair    Disposition: Hospice/Medical Facility    Follow Up/Patient Instructions:     Medications:  Reconciled Home Medications:   Current Discharge Medication List      START taking these medications    Details   acetaminophen (TYLENOL) 325 MG tablet Take 2 tablets (650 mg total) by mouth every 6 (six) hours as needed for Pain or Temperature greater than (101F).  Refills: 0      aluminum-magnesium hydroxide-simethicone (MAALOX) 200-200-20 mg/5 mL Susp Take 15 mLs by mouth every 6 (six) hours as needed (acid reflux).      amlodipine (NORVASC) 10 MG tablet Take 1 tablet (10 mg total) by mouth once  daily.  Qty: 30 tablet, Refills: 3      bisacodyl (DULCOLAX) 10 mg Supp Place 1 suppository (10 mg total) rectally daily as needed.  Refills: 0      carvedilol (COREG) 25 MG tablet Take 1 tablet (25 mg total) by mouth 2 (two) times daily.  Qty: 60 tablet, Refills: 3    Associated Diagnoses: Essential hypertension      docusate sodium (ENEMEEZ) 283 mg enema Place 1 enema rectally daily as needed.  Refills: 0      famotidine (PEPCID) 20 MG tablet Take 1 tablet (20 mg total) by mouth 2 (two) times daily.  Qty: 60 tablet, Refills: 3      magnesium hydroxide 400 mg/5 ml 400 mg/5 mL Susp Take 30 mLs (2,400 mg total) by mouth daily as needed.      oxycodone (ROXICODONE) 10 mg Tab immediate release tablet Take 1 tablet (10 mg total) by mouth every 4 (four) hours as needed.  Qty: 90 tablet, Refills: 0      senna-docusate 8.6-50 mg (PERICOLACE) 8.6-50 mg per tablet Take 1 tablet by mouth once daily.      sodium chloride 1 gram tablet Take 1 tablet (1 g total) by mouth 2 (two) times daily.      sodium phosphates (ENEMA) 19-7 gram/118 mL Enem Place 1 enema rectally daily as needed (constipation).  Refills: 0      zolpidem (AMBIEN) 5 MG Tab Take 1 tablet (5 mg total) by mouth every evening.  Qty: 30 tablet, Refills: 3         CONTINUE these medications which have CHANGED    Details   gabapentin (NEURONTIN) 300 MG capsule Take 1 capsule (300 mg total) by mouth 3 (three) times daily.  Qty: 90 capsule, Refills: 3      methocarbamol (ROBAXIN) 750 MG Tab Take 1 tablet (750 mg total) by mouth 3 (three) times daily as needed.  Qty: 90 tablet, Refills: 3      ondansetron (ZOFRAN-ODT) 8 MG TbDL Take 1 tablet (8 mg total) by mouth every 8 (eight) hours as needed.  Qty: 60 tablet, Refills: 3    Associated Diagnoses: Upper extremity weakness; Metastatic cancer to bone; Cervical radiculopathy; Cellulitis and abscess of leg; Hyponatremia; Alcohol abuse; Hematoma; Cellulitis of foot, left; Trauma; Intussusception of colon; Villous adenoma of  right colon; History of colon cancer, stage I      quinapril (ACCUPRIL) 20 MG tablet Take 1 tablet (20 mg total) by mouth once daily.  Qty: 30 tablet, Refills: 3      tamsulosin (FLOMAX) 0.4 mg Cp24 Take 1 capsule (0.4 mg total) by mouth every evening.  Qty: 30 capsule, Refills: 3         STOP taking these medications       docusate sodium (COLACE) 50 MG capsule Comments:   Reason for Stopping:         fentaNYL (DURAGESIC) 50 mcg/hr Comments:   Reason for Stopping:         hydrocodone-acetaminophen 10-325mg (NORCO)  mg Tab Comments:   Reason for Stopping:         metformin (GLUCOPHAGE) 500 MG tablet Comments:   Reason for Stopping:         metoprolol succinate (TOPROL-XL) 200 MG 24 hr tablet Comments:   Reason for Stopping:         oxycodone-acetaminophen (PERCOCET)  mg per tablet Comments:   Reason for Stopping:         promethazine (PHENERGAN) 12.5 MG Tab Comments:   Reason for Stopping:               Discharge Procedure Orders  Diet diabetic   Order Comments: 2000 jose cruz  Glucerna 1 can TID with meals  Supervise and encourage meals     Vital signs per facility protocol     Skin assessment every shift      No activity restrictions

## 2017-03-04 NOTE — DISCHARGE INSTRUCTIONS
Nursin.  Sacral/buttock area:  nursing to clean the wound with wound cleanser, apply Santyl to the wound/eschar areas to enzymatic debride the wound, cover with moisten gauze to activate Santyl then cover with a mepore border dressing daily.  2.  Aspiration precautions  3.  Float heels  4.  Supervise and encourage meals, please  5.  Cervical collar when OOB until 3/27/17.  May remove in bed and for showers.

## 2017-03-04 NOTE — PLAN OF CARE
Problem: Patient Care Overview  Goal: Plan of Care Review  Outcome: Ongoing (interventions implemented as appropriate)  Pt awake, alert and disoriented to situation. Patient is incontinent of bowel, continent of urine this am, assisted with placement of urinal. Wound care completed per orders. Call light within reach. Bed in low position, wheels locked. Frequent rounds to ensure patient safety. Pain control management continued. VSS. Will continue to monitor.

## 2017-03-04 NOTE — PLAN OF CARE
Problem: Patient Care Overview  Goal: Plan of Care Review  Outcome: Ongoing (interventions implemented as appropriate)  POC reviewed with pt who verbalized understanding. Pt AAOX4.  Remains free of falls and injury. VSS and afebrile.  C/o slight pain controlled with PRN medication.  Pt repositioned q2 hours to help decrease pressure. Pt sleeping throughout the night.  Infection control measures taken, such as hand washing and proper disposal of contaminated materials.Safety rounds maintained according to protocol, environmental consistency maintained. Rah Puente agrees to call when assistance is needed per call light which is within reach. No acute events. No distress noted. WCTM.

## 2017-03-07 DIAGNOSIS — C18.9 ADENOCARCINOMA OF COLON: Primary | ICD-10-CM

## 2017-03-07 NOTE — PROGRESS NOTES
03/03/17 1113   Roll Left and Right   Assistance Needed Incidental touching   CARE Score 4   Sit to Lying   Assistance Needed Incidental touching   CARE Score 4   Lying to Sitting on Side of Bed   Physical Assistance Level 50%-74%   CARE Score 2   Sit to Stand   Physical Assistance Level 75% or more   CARE Score 2   Chair/Bed-to-Chair Transfer   Physical Assistance Level 50%-74%   CARE Score 2   Toilet Transfer   Physical Assistance Level 25%-49%   CARE Score 3   Car Transfer   Reason if not Attempted Safety concerns   CARE Score 88

## 2017-03-07 NOTE — PROGRESS NOTES
03/03/17 1115   Walk 10 Feet   Reason if not Attempted Safety concerns   CARE Score 88   Walk 50 Feet with Two Turns   Reason if not Attempted Activity not applicable   CARE Score 9   Walk 150 Feet   Reason if not Attempted Activity not applicable   CARE Score 9   Walking 10 Feet on Uneven Surfaces   Reason if not Attempted Safety concerns   CARE Score 88   1 Step (Curb)   Reason if not Attempted Safety concerns   CARE Score 88   4 Steps   Reason if not Attempted Activity not applicable   CARE Score 9   12 Steps   Reason if not Attempted Activity not applicable   CARE Score 9   Picking Up Object   Reason if not Attempted Activity not applicable   CARE Score 9   Wheel 50 Feet with Two Turns   Physical Assistance Level Less than 25%   CARE Score 3   Indicate the type of wheelchair/scooter used Manual   Wheel 150 Feet   Reason if not Attempted Medical concerns   CARE Score 88   Indicate the type of wheelchair/scooter used Manual

## 2017-03-07 NOTE — PROGRESS NOTES
02/18/17 1646   Hearing, Speech, and Vision   Expression of Ideas and Wants Some difficulty   Understanding Verbal Content Usually understands

## 2017-03-07 NOTE — PROGRESS NOTES
02/18/17 1645   Eating   Assistance Needed Set-up / clean-up   CARE Score 5   Oral Hygiene   Physical Assistance Level Total assistance   CARE Score 1   Toileting Hygiene   Assistance Needed Physical assistance   Physical Assistance Level Total assistance   CARE Score 1   Shower/Bathe Self   Assistance Needed Physical assistance   Physical Assistance Level 75% or more   CARE Score 2   Upper Body Dressing   Assistance Needed Physical assistance   Physical Assistance Level 75% or more   CARE Score 2   Lower Body Dressing   Assistance Needed Physical assistance   Physical Assistance Level Total assistance   CARE Score 1   Putting On/Taking Off Footwear   Assistance Needed Physical assistance   Physical Assistance Level Total assistance   CARE Score 1

## 2017-03-07 NOTE — PROGRESS NOTES
02/18/17 1150   Roll Left and Right   Reason if not Attempted Refused to perform   CARE Score 7   Sit to Lying   Assistance Needed Incidental touching   CARE Score 4   Lying to Sitting on Side of Bed   Assistance Needed Physical assistance   Physical Assistance Level Less than 25%   CARE Score 3   Sit to Stand   Physical Assistance Level 50%-74%   CARE Score 2   Chair/Bed-to-Chair Transfer   Assistance Needed Physical assistance   Physical Assistance Level 75% or more   CARE Score 2   Toilet Transfer   Assistance Needed Physical assistance   Physical Assistance Level 75% or more   CARE Score 2   Car Transfer   Reason if not Attempted Medical concerns   CARE Score 88

## 2017-03-07 NOTE — PROGRESS NOTES
"   02/18/17 0856   Cognitive Pattern Assessment   Cognitive Pattern Assessment Used BIMS   Brief Interview for Mental Status (BIMS)   Repetition of Three Words (First Attempt) 3   Temporial Orientation: Year Correct   Temporal Orientation: Month Missed by more than 1 month   Temporal Orientation: Day Incorrect   Recall: "Sock" No, could not recall   Recall: "Blue" Yes, no cue required   Recall: "Bed" No, could not recall   BIMS Summary Score 8     "

## 2017-03-07 NOTE — PROGRESS NOTES
02/18/17 1151   Walk 10 Feet   Assistance Needed Physical assistance   Physical Assistance Level 25%-49%   Comment with RW   CARE Score 3   Walk 50 Feet with Two Turns   Reason if not Attempted Medical concerns   CARE Score 88   Walk 150 Feet   Reason if not Attempted Activity not applicable   CARE Score 9   Walking 10 Feet on Uneven Surfaces   Reason if not Attempted Safety concerns   CARE Score 88   1 Step (Curb)   Reason if not Attempted Safety concerns   CARE Score 88   4 Steps   Reason if not Attempted Activity not applicable   CARE Score 9   12 Steps   Reason if not Attempted Activity not applicable   CARE Score 9   Picking Up Object   Reason if not Attempted Safety concerns   CARE Score 88   Does the patient use a wheelchair/scooter? Yes-->Continue to Wheel 50 feet with two turns.   Wheel 50 Feet with Two Turns   Reason if not Attempted Medical concerns   CARE Score 88   Indicate the type of wheelchair/scooter used Manual   Wheel 150 Feet   Reason if not Attempted Activity not applicable   CARE Score 9   Indicate the type of wheelchair/scooter used Manual

## 2017-03-07 NOTE — PROGRESS NOTES
02/18/17 1150   Roll Left and Right   Reason if not Attempted Refused to perform   CARE Score 7   Sit to Lying   Assistance Needed Incidental touching   CARE Score 4   Lying to Sitting on Side of Bed   Assistance Needed Physical assistance   Physical Assistance Level Less than 25%   CARE Score 3   Sit to Stand   Physical Assistance Level 50%-74%   CARE Score 2   Chair/Bed-to-Chair Transfer   Assistance Needed Physical assistance   Physical Assistance Level 75% or more   CARE Score 2   Chair/Bed-to-Chair Transfer Goal 4   Toilet Transfer   Assistance Needed Physical assistance   Physical Assistance Level 75% or more   CARE Score 2   Car Transfer   Reason if not Attempted Medical concerns   CARE Score 88

## 2017-03-07 NOTE — PROGRESS NOTES
03/03/17 1117   Eating   Assistance Needed Supervision   CARE Score 4   Oral Hygiene   Physical Assistance Level Less than 25%   CARE Score 3   Toileting Hygiene   Physical Assistance Level Total assistance   CARE Score 1   Shower/Bathe Self   Physical Assistance Level 25%-49%   CARE Score 3   Upper Body Dressing   Physical Assistance Level 25%-49%   CARE Score 3   Lower Body Dressing   Physical Assistance Level Total assistance   CARE Score 1   Putting On/Taking Off Footwear   Physical Assistance Level Total assistance   CARE Score 1

## 2017-03-08 ENCOUNTER — TELEPHONE (OUTPATIENT)
Dept: HEMATOLOGY/ONCOLOGY | Facility: CLINIC | Age: 62
End: 2017-03-08

## 2017-03-08 DIAGNOSIS — C80.1 CANCER: Primary | ICD-10-CM

## 2017-03-08 NOTE — TELEPHONE ENCOUNTER
----- Message from Lisa Clemons sent at 3/8/2017 11:44 AM CST -----  Contact: Shital Rod (Sister)  Shital states her brother has been moved to hospice and they are requesting orders to be sent over from Dr.Matrana Zhang [facility] fax# [phone]    Contact number [phone]  Thanks

## 2017-03-09 ENCOUNTER — TELEPHONE (OUTPATIENT)
Dept: PHYSICAL MEDICINE AND REHAB | Facility: CLINIC | Age: 62
End: 2017-03-09

## 2017-03-09 NOTE — TELEPHONE ENCOUNTER
----- Message from Brody Savage MD sent at 3/9/2017  3:35 PM CST -----  Contact: Sara with [employer] [phone] ext [phone]  I don't have a copy of the paperwork, either.  Will you please call the caller and ask how we can help?  Tell her the pt is dying of cancer and has been admitted to hospice.  If we can help with his paperwork we will.    ----- Message -----     From: Mona Marie MA     Sent: 3/9/2017   2:41 PM       To: Brody Savage MD    I don't have this, this a hospital pt. Horacio said she did not have a copy of the paperwork.     ----- Message -----     From: Aidee Leblanc MD     Sent: 3/9/2017   2:24 PM       To: Mona Marie MA    I believe this is Dr. Savage's pt.     Thanks!    ----- Message -----     From: Mona Marie MA     Sent: 3/9/2017   2:19 PM       To: Aidee Leblanc MD        ----- Message -----     From: Guillermo Daley     Sent: 3/9/2017   2:03 PM       To: Janette BARBOSA Staff    Caller is calling to get clarity on paperwork she received, pls call

## 2017-03-13 LAB — FUNGUS SPEC CULT: NORMAL

## 2017-04-17 LAB
ACID FAST MOD KINY STN SPEC: NORMAL
MYCOBACTERIUM SPEC QL CULT: NORMAL

## 2017-11-12 NOTE — PT/OT/SLP DISCHARGE
Occupational Therapy Discharge Summary    Rah Puente  MRN: [id number]   <principal problem not specified>   Patient Discharged from acute Occupational Therapy on 2/17/2017.  Please refer to prior OT note dated on 2/15/2017 for functional status.     Assessment:   Patient appropriate for care in another setting.  GOALS:    Occupational Therapy Goals     Not on file              Reasons for Discontinuation of Therapy Services  Transfer to alternate level of care.      Plan:  Patient Discharged to: Inpatient Rehab.

## 2021-03-31 NOTE — TELEPHONE ENCOUNTER
----- Message from Eliane Hinds sent at 2/14/2017  9:47 AM CST -----  Can you enter labs; cbc, cmp. thanks   Right great toe wound is new & unexpected at today's wound care appt.

## (undated) DEVICE — KIT EVACUATOR FLAT DRAIN 100CC

## (undated) DEVICE — SEE MEDLINE ITEM 157117

## (undated) DEVICE — SPONGE LAP 4X18 PREWASHED

## (undated) DEVICE — SUT 3-0 12-18IN SILK

## (undated) DEVICE — TRAY CATH UM FOLEY SIL W 16FR

## (undated) DEVICE — TUBE EMG NIM 8.0MM TRIVANTAGE

## (undated) DEVICE — GAUZE SPONGE 4X4 12PLY

## (undated) DEVICE — SUT MONOCRYL 5-0 P-3 UND 18

## (undated) DEVICE — APPLICATOR CHLORAPREP ORN 26ML

## (undated) DEVICE — DRAIN CHANNEL ROUND 10FR

## (undated) DEVICE — SUT VICRYL BR 1 GEN 27 CT-1

## (undated) DEVICE — DRAPE C ARM 42 X 120 10/BX

## (undated) DEVICE — ELECTRODE EXTENDED BLADE

## (undated) DEVICE — STAPLER SKIN PROXIMATE WIDE

## (undated) DEVICE — TUBE FRAZIER 5MM 2FT SOFT TIP

## (undated) DEVICE — KIT SURGIFLO HEMOSTATIC MATRIX

## (undated) DEVICE — SUT MCRYL PLUS 4-0 PS2 27IN

## (undated) DEVICE — DRAPE STERI-DRAPE 1000 17X11IN

## (undated) DEVICE — ELECTRODE REM PLYHSV RETURN 9

## (undated) DEVICE — CONTAINER SPECIMEN STRL 4OZ

## (undated) DEVICE — DRESSING TRANS 4X4 TEGADERM

## (undated) DEVICE — DRAIN HEMOVAC 3/16 LARGE

## (undated) DEVICE — DRESSING TELFA STRL 4X3 LF

## (undated) DEVICE — DRAPE STERI 11 X 16

## (undated) DEVICE — HEMOVAC (MINI) 2562-000-10

## (undated) DEVICE — KIT EVACUATOR 3-SPRING 1/8 DRN

## (undated) DEVICE — PACK DRAPE UNIVERSAL CONVERTOR

## (undated) DEVICE — DRAPE INCISE IOBAN 2 23X23IN

## (undated) DEVICE — 14MM DRILL

## (undated) DEVICE — DRAPE STERI INSTRUMENT 1018

## (undated) DEVICE — DRAPE C-ARMOR EQUIPMENT COVER

## (undated) DEVICE — CLOSURE SKIN STERI STRIP 1/2X4

## (undated) DEVICE — GAUZE SPONGE PEANUT STRL

## (undated) DEVICE — KIT IRR SUCTION HND PIECE

## (undated) DEVICE — ELECTRODE BLD 1 INCH TEFLON

## (undated) DEVICE — SEE MEDLINE ITEM 157148

## (undated) DEVICE — SUT SILK 3-0 SH 18IN BLACK

## (undated) DEVICE — SEE MEDLINE ITEM 156905

## (undated) DEVICE — PIN FIXATION TEMPORARY STRGHT
Type: IMPLANTABLE DEVICE | Site: SPINE CERVICAL | Status: NON-FUNCTIONAL
Removed: 2017-02-13

## (undated) DEVICE — SPONGE LAP 18X18 PREWASHED

## (undated) DEVICE — CARTRIDGE OIL

## (undated) DEVICE — DRAPE IOBAN 2 STERI

## (undated) DEVICE — SUT BONE WAX 2.5 GRMS 12/BX

## (undated) DEVICE — DIFFUSER

## (undated) DEVICE — SEE MEDLINE ITEM 157131

## (undated) DEVICE — ELECTRODE BLADE INSULATED 1 IN